# Patient Record
Sex: FEMALE | Race: BLACK OR AFRICAN AMERICAN | NOT HISPANIC OR LATINO | Employment: UNEMPLOYED | URBAN - METROPOLITAN AREA
[De-identification: names, ages, dates, MRNs, and addresses within clinical notes are randomized per-mention and may not be internally consistent; named-entity substitution may affect disease eponyms.]

---

## 2023-07-26 ENCOUNTER — OFFICE VISIT (OUTPATIENT)
Dept: PRIMARY CARE | Facility: CLINIC | Age: 63
End: 2023-07-26
Payer: COMMERCIAL

## 2023-07-26 VITALS
SYSTOLIC BLOOD PRESSURE: 144 MMHG | DIASTOLIC BLOOD PRESSURE: 84 MMHG | WEIGHT: 278 LBS | TEMPERATURE: 96.8 F | OXYGEN SATURATION: 99 % | BODY MASS INDEX: 38.77 KG/M2 | HEART RATE: 74 BPM | RESPIRATION RATE: 16 BRPM

## 2023-07-26 DIAGNOSIS — E11.9 TYPE 2 DIABETES MELLITUS WITHOUT COMPLICATION, WITHOUT LONG-TERM CURRENT USE OF INSULIN (MULTI): ICD-10-CM

## 2023-07-26 DIAGNOSIS — E55.9 VITAMIN D DEFICIENCY: ICD-10-CM

## 2023-07-26 DIAGNOSIS — E66.9 CLASS 2 OBESITY WITHOUT SERIOUS COMORBIDITY WITH BODY MASS INDEX (BMI) OF 38.0 TO 38.9 IN ADULT, UNSPECIFIED OBESITY TYPE: ICD-10-CM

## 2023-07-26 DIAGNOSIS — F17.200 TOBACCO DEPENDENCE SYNDROME: ICD-10-CM

## 2023-07-26 DIAGNOSIS — M62.81 WEAKNESS OF TRUNK MUSCULATURE: ICD-10-CM

## 2023-07-26 DIAGNOSIS — I10 BENIGN ESSENTIAL HYPERTENSION: Primary | ICD-10-CM

## 2023-07-26 DIAGNOSIS — E78.2 HYPERLIPIDEMIA, MIXED: ICD-10-CM

## 2023-07-26 DIAGNOSIS — M47.819 SPINAL ARTHRITIS: ICD-10-CM

## 2023-07-26 DIAGNOSIS — B37.2 CANDIDAL INTERTRIGO: ICD-10-CM

## 2023-07-26 DIAGNOSIS — M25.69 DECREASED ROM OF TRUNK AND BACK: ICD-10-CM

## 2023-07-26 PROBLEM — G89.4 CHRONIC PAIN SYNDROME: Status: ACTIVE | Noted: 2023-07-26

## 2023-07-26 PROBLEM — E87.6 HYPOKALEMIA: Status: ACTIVE | Noted: 2023-07-26

## 2023-07-26 PROBLEM — M25.539 WRIST PAIN: Status: ACTIVE | Noted: 2023-07-26

## 2023-07-26 PROBLEM — M62.89 HAMSTRING TIGHTNESS: Status: ACTIVE | Noted: 2023-07-26

## 2023-07-26 PROBLEM — Z55.0 UNABLE TO READ OR WRITE: Status: ACTIVE | Noted: 2023-07-26

## 2023-07-26 PROBLEM — H10.30 ACUTE CONJUNCTIVITIS: Status: ACTIVE | Noted: 2023-07-26

## 2023-07-26 PROBLEM — M25.561 RIGHT KNEE PAIN: Status: ACTIVE | Noted: 2023-07-26

## 2023-07-26 PROBLEM — M54.2 NECK PAIN: Status: ACTIVE | Noted: 2023-07-26

## 2023-07-26 PROBLEM — K63.5 COLON POLYP: Status: ACTIVE | Noted: 2023-07-26

## 2023-07-26 PROBLEM — R07.81 RIB PAIN: Status: ACTIVE | Noted: 2023-07-26

## 2023-07-26 PROBLEM — M54.9 BACK PAIN: Status: ACTIVE | Noted: 2023-07-26

## 2023-07-26 PROBLEM — S99.921A INJURY OF TOENAIL OF RIGHT FOOT: Status: ACTIVE | Noted: 2023-07-26

## 2023-07-26 PROBLEM — G56.02 CARPAL TUNNEL SYNDROME OF LEFT WRIST: Status: ACTIVE | Noted: 2023-07-26

## 2023-07-26 PROBLEM — M08.861: Status: ACTIVE | Noted: 2023-07-26

## 2023-07-26 PROBLEM — M77.12 LATERAL EPICONDYLITIS OF LEFT ELBOW: Status: ACTIVE | Noted: 2023-07-26

## 2023-07-26 PROBLEM — K62.5 RECTAL BLEEDING: Status: ACTIVE | Noted: 2023-07-26

## 2023-07-26 PROCEDURE — 99215 OFFICE O/P EST HI 40 MIN: CPT | Performed by: STUDENT IN AN ORGANIZED HEALTH CARE EDUCATION/TRAINING PROGRAM

## 2023-07-26 PROCEDURE — 3044F HG A1C LEVEL LT 7.0%: CPT | Performed by: STUDENT IN AN ORGANIZED HEALTH CARE EDUCATION/TRAINING PROGRAM

## 2023-07-26 PROCEDURE — 1160F RVW MEDS BY RX/DR IN RCRD: CPT | Performed by: STUDENT IN AN ORGANIZED HEALTH CARE EDUCATION/TRAINING PROGRAM

## 2023-07-26 PROCEDURE — 3008F BODY MASS INDEX DOCD: CPT | Performed by: STUDENT IN AN ORGANIZED HEALTH CARE EDUCATION/TRAINING PROGRAM

## 2023-07-26 PROCEDURE — 4004F PT TOBACCO SCREEN RCVD TLK: CPT | Performed by: STUDENT IN AN ORGANIZED HEALTH CARE EDUCATION/TRAINING PROGRAM

## 2023-07-26 PROCEDURE — 1126F AMNT PAIN NOTED NONE PRSNT: CPT | Performed by: STUDENT IN AN ORGANIZED HEALTH CARE EDUCATION/TRAINING PROGRAM

## 2023-07-26 PROCEDURE — 3079F DIAST BP 80-89 MM HG: CPT | Performed by: STUDENT IN AN ORGANIZED HEALTH CARE EDUCATION/TRAINING PROGRAM

## 2023-07-26 PROCEDURE — 1159F MED LIST DOCD IN RCRD: CPT | Performed by: STUDENT IN AN ORGANIZED HEALTH CARE EDUCATION/TRAINING PROGRAM

## 2023-07-26 PROCEDURE — 3077F SYST BP >= 140 MM HG: CPT | Performed by: STUDENT IN AN ORGANIZED HEALTH CARE EDUCATION/TRAINING PROGRAM

## 2023-07-26 RX ORDER — LANCETS 33 GAUGE
EACH MISCELLANEOUS
COMMUNITY
Start: 2023-05-01 | End: 2024-01-22

## 2023-07-26 RX ORDER — KETOCONAZOLE 20 MG/G
1 CREAM TOPICAL DAILY
COMMUNITY
Start: 2023-07-10 | End: 2023-07-26 | Stop reason: ALTCHOICE

## 2023-07-26 RX ORDER — GABAPENTIN 300 MG/1
1 CAPSULE ORAL 3 TIMES DAILY
COMMUNITY
Start: 2022-01-13 | End: 2023-07-26 | Stop reason: ALTCHOICE

## 2023-07-26 RX ORDER — ISOPROPYL ALCOHOL 0.75 G/1
SWAB TOPICAL
COMMUNITY
Start: 2023-01-20 | End: 2024-01-22

## 2023-07-26 RX ORDER — AMLODIPINE BESYLATE 10 MG/1
10 TABLET ORAL DAILY
COMMUNITY
End: 2023-07-26 | Stop reason: SDUPTHER

## 2023-07-26 RX ORDER — ERGOCALCIFEROL 1.25 MG/1
1 CAPSULE ORAL
COMMUNITY
End: 2023-12-12 | Stop reason: ALTCHOICE

## 2023-07-26 RX ORDER — VIT C/E/ZN/COPPR/LUTEIN/ZEAXAN 250MG-90MG
25 CAPSULE ORAL DAILY
Qty: 90 CAPSULE | Refills: 1 | Status: SHIPPED | OUTPATIENT
Start: 2023-07-26 | End: 2023-12-12 | Stop reason: SDUPTHER

## 2023-07-26 RX ORDER — CALCIUM CITRATE/VITAMIN D3 200MG-6.25
1 TABLET ORAL 2 TIMES DAILY
COMMUNITY
End: 2024-01-22

## 2023-07-26 RX ORDER — LORATADINE 10 MG/1
10 TABLET ORAL DAILY PRN
COMMUNITY
Start: 2022-12-28 | End: 2023-07-26 | Stop reason: ALTCHOICE

## 2023-07-26 RX ORDER — HYDROCORTISONE 25 MG/G
1 CREAM TOPICAL 2 TIMES DAILY
COMMUNITY
Start: 2023-07-10 | End: 2023-07-26 | Stop reason: ALTCHOICE

## 2023-07-26 RX ORDER — ERGOCALCIFEROL 1.25 MG/1
1 CAPSULE ORAL
Qty: 90 CAPSULE | Refills: 1 | Status: CANCELLED | OUTPATIENT
Start: 2023-07-26

## 2023-07-26 RX ORDER — AMITRIPTYLINE HYDROCHLORIDE 25 MG/1
25 TABLET, FILM COATED ORAL NIGHTLY
Qty: 90 TABLET | Refills: 1 | Status: CANCELLED | OUTPATIENT
Start: 2023-07-26

## 2023-07-26 RX ORDER — TOBRAMYCIN AND DEXAMETHASONE 3; 1 MG/ML; MG/ML
1 SUSPENSION/ DROPS OPHTHALMIC 4 TIMES DAILY
COMMUNITY
Start: 2022-12-02 | End: 2023-07-26 | Stop reason: ALTCHOICE

## 2023-07-26 RX ORDER — AMLODIPINE BESYLATE 10 MG/1
10 TABLET ORAL DAILY
Qty: 90 TABLET | Refills: 1 | Status: SHIPPED | OUTPATIENT
Start: 2023-07-26 | End: 2023-12-12 | Stop reason: SDUPTHER

## 2023-07-26 RX ORDER — CLOTRIMAZOLE 1 %
1 CREAM (GRAM) TOPICAL 2 TIMES DAILY
COMMUNITY
Start: 2021-12-13 | End: 2023-07-26 | Stop reason: ALTCHOICE

## 2023-07-26 RX ORDER — CYCLOBENZAPRINE HCL 10 MG
1 TABLET ORAL 3 TIMES DAILY PRN
COMMUNITY
Start: 2021-12-13 | End: 2023-07-26 | Stop reason: ALTCHOICE

## 2023-07-26 RX ORDER — SYRINGE-NEEDLE,INSULIN,0.5 ML 28GX1/2"
600 SYRINGE, EMPTY DISPOSABLE MISCELLANEOUS 2 TIMES DAILY
COMMUNITY
Start: 2022-12-28 | End: 2023-07-26 | Stop reason: ALTCHOICE

## 2023-07-26 RX ORDER — MELOXICAM 7.5 MG/1
7.5 TABLET ORAL 2 TIMES DAILY PRN
COMMUNITY
End: 2023-07-26 | Stop reason: ALTCHOICE

## 2023-07-26 RX ORDER — NYSTATIN 100000 [USP'U]/G
POWDER TOPICAL 2 TIMES DAILY
Qty: 60 G | Refills: 1 | Status: SHIPPED | OUTPATIENT
Start: 2023-07-26 | End: 2024-04-04 | Stop reason: ALTCHOICE

## 2023-07-26 RX ORDER — AMITRIPTYLINE HYDROCHLORIDE 25 MG/1
25 TABLET, FILM COATED ORAL NIGHTLY
COMMUNITY
Start: 2022-01-13 | End: 2023-07-26 | Stop reason: ALTCHOICE

## 2023-07-26 SDOH — ECONOMIC STABILITY: FOOD INSECURITY: WITHIN THE PAST 12 MONTHS, YOU WORRIED THAT YOUR FOOD WOULD RUN OUT BEFORE YOU GOT MONEY TO BUY MORE.: NEVER TRUE

## 2023-07-26 SDOH — ECONOMIC STABILITY: FOOD INSECURITY: WITHIN THE PAST 12 MONTHS, THE FOOD YOU BOUGHT JUST DIDN'T LAST AND YOU DIDN'T HAVE MONEY TO GET MORE.: NEVER TRUE

## 2023-07-26 ASSESSMENT — ENCOUNTER SYMPTOMS
LOSS OF SENSATION IN FEET: 1
WHEEZING: 0
DIZZINESS: 0
ABDOMINAL PAIN: 0
NAUSEA: 0
COLOR CHANGE: 0
FEVER: 0
OCCASIONAL FEELINGS OF UNSTEADINESS: 0
CHILLS: 0
UNEXPECTED WEIGHT CHANGE: 0
FATIGUE: 0
CONSTIPATION: 0
COUGH: 0
VOMITING: 0
CONFUSION: 0
DIARRHEA: 0
DEPRESSION: 0
MUSCULOSKELETAL NEGATIVE: 1
SHORTNESS OF BREATH: 0
HEADACHES: 0
PALPITATIONS: 0

## 2023-07-26 ASSESSMENT — PATIENT HEALTH QUESTIONNAIRE - PHQ9
SUM OF ALL RESPONSES TO PHQ9 QUESTIONS 1 & 2: 0
2. FEELING DOWN, DEPRESSED OR HOPELESS: NOT AT ALL
1. LITTLE INTEREST OR PLEASURE IN DOING THINGS: NOT AT ALL

## 2023-07-26 ASSESSMENT — LIFESTYLE VARIABLES
AUDIT-C TOTAL SCORE: 0
HOW OFTEN DO YOU HAVE A DRINK CONTAINING ALCOHOL: NEVER
SKIP TO QUESTIONS 9-10: 1
HOW MANY STANDARD DRINKS CONTAINING ALCOHOL DO YOU HAVE ON A TYPICAL DAY: PATIENT DOES NOT DRINK
HOW OFTEN DO YOU HAVE SIX OR MORE DRINKS ON ONE OCCASION: NEVER

## 2023-07-26 ASSESSMENT — PAIN SCALES - GENERAL: PAINLEVEL: 0-NO PAIN

## 2023-07-26 NOTE — PROGRESS NOTES
Subjective   Patient ID: Macarena Adler is a 63 y.o. female who presents for Hyperlipidemia, Hypertension, and Rash (Under breast fungal cream not working).    HPI   Former [facility] pt here to estb care and also for FU visit. Reports she is doing okay, no major issues today except ongoing fungal infection under her breast. She is using fungal crm w/ minimal help. Also req handicap sticker, reports difficulty walking d/t back pain, some SOB and leg weakness.     # HTN   - io /84   - amlodipine 10 mg daily   - smokes cigs about 1/2 ppd x many years     # Vitamin D def   - last Vit D level 38 (04/22)   - takes Vit D 50,000 U wkly, req refills     # prediabetes   - last A1c 6.2 (12/22)  - not on any meds at the moment     Review of Systems   Constitutional:  Negative for chills, fatigue, fever and unexpected weight change.   HENT: Negative.     Respiratory:  Negative for cough, shortness of breath and wheezing.    Cardiovascular:  Negative for chest pain, palpitations and leg swelling.   Gastrointestinal:  Negative for abdominal pain, constipation, diarrhea, nausea and vomiting.   Musculoskeletal: Negative.    Skin:  Positive for rash. Negative for color change.   Neurological:  Negative for dizziness and headaches.   Psychiatric/Behavioral:  Negative for behavioral problems and confusion.        Objective   /84 (BP Location: Left arm, Patient Position: Sitting, BP Cuff Size: Adult)   Pulse 74   Temp 36 °C (96.8 °F) (Temporal)   Resp 16   Wt 126 kg (278 lb)   SpO2 99%   BMI 38.77 kg/m²     Physical Exam  Vitals and nursing note reviewed.   Constitutional:       Appearance: Normal appearance. She is obese.   Cardiovascular:      Rate and Rhythm: Normal rate and regular rhythm.      Pulses: Normal pulses.      Heart sounds: Normal heart sounds.   Pulmonary:      Effort: Pulmonary effort is normal. No respiratory distress.      Breath sounds: Normal breath sounds.   Abdominal:      General: Abdomen is flat.  Bowel sounds are normal.      Palpations: Abdomen is soft.   Musculoskeletal:         General: Normal range of motion.   Neurological:      General: No focal deficit present.      Mental Status: She is alert and oriented to person, place, and time.   Psychiatric:         Mood and Affect: Mood normal.         Behavior: Behavior normal.       Assessment/Plan   Former [facility] pt here to estb care and also for FU visit. She intermittently getting candida intertrigo, will start nystatin powder as below. Adv to keep area clean & dry all the time as possible.   She has diff ambulating d/t cont lower back pain/arthritis; will provide handicap sticker as requested x 2 yrs. Adv to work on weight thru diet & exercises. Others problems stable as listed below.      # HTN   - io /84, sl above the goal   - cont same for now: amlodipine 10 mg daily   - rec to work on cutting down on smoking cigs; follow DASH diet and work on wts   - BW per emr, will call for any abn results as indicated; pt made aware     # Vitamin D def   - last Vit D level 38 (04/22)  - will stop 50,000 U wkly; start on Vit D 1000 U daily     # prediabetes   - last A1c 6.2 (12/22)  - cont following low carb diet and daily exercises   - repeat A1c and other labs as below   Problem List Items Addressed This Visit       Benign essential hypertension - Primary    Relevant Medications    amLODIPine (Norvasc) 10 mg tablet    Other Relevant Orders    Comprehensive metabolic panel    Decreased ROM of trunk and back    Relevant Orders    Disability Placard    Hyperlipidemia, mixed    Relevant Orders    CBC    Diabetes mellitus, type 2 (CMS/HCC)    Relevant Orders    Hemoglobin A1c    CBC    Spinal arthritis    Relevant Orders    Disability Placard    Vitamin D deficiency    Relevant Medications    cholecalciferol (Vitamin D-3) 25 MCG (1000 UT) capsule    Weakness of trunk musculature    Relevant Orders    Disability Placard    Obesity, unspecified    Tobacco dependence  syndrome    Relevant Orders    Lipid Panel     Other Visit Diagnoses       Candidal intertrigo        Relevant Medications    nystatin (Mycostatin) 100,000 unit/gram powder          Rtc 5-6 mo for HM/FU.    Adilson Kelly MD   [facility], [facility]

## 2023-10-24 ENCOUNTER — TELEPHONE (OUTPATIENT)
Dept: GASTROENTEROLOGY | Facility: CLINIC | Age: 63
End: 2023-10-24
Payer: COMMERCIAL

## 2023-10-24 DIAGNOSIS — D12.6 ADENOMATOUS POLYP OF COLON, UNSPECIFIED PART OF COLON: ICD-10-CM

## 2023-10-24 NOTE — TELEPHONE ENCOUNTER
Patient is due for her 5 year repeat colonoscopy - last done by Dr. Alexander 2018    Patient calling to schedule. Please place order for repeat. Will process via Open access when placed. OA process explained to patient.

## 2023-10-27 ENCOUNTER — TELEPHONE (OUTPATIENT)
Dept: GASTROENTEROLOGY | Facility: CLINIC | Age: 63
End: 2023-10-27
Payer: COMMERCIAL

## 2023-10-27 NOTE — TELEPHONE ENCOUNTER
----- Message from Hazel King RN sent at 10/26/2023  2:50 PM EDT -----  Regarding: Colonoscopy screening  Open Access

## 2023-10-27 NOTE — TELEPHONE ENCOUNTER
H: 6'  W: 285  BMI: 38.6    PLEASE ALSO PUT ON NOTE LINE ADENOMATOUS POLYP OF COLON, UNSPECIFIED PART OF

## 2023-10-30 NOTE — TELEPHONE ENCOUNTER
Pt came in today to schedule. Pt is scheduled with Nieves 11/29/2023 Miralax prep given. Packet was given to pt.

## 2023-11-15 ENCOUNTER — LAB (OUTPATIENT)
Dept: LAB | Facility: LAB | Age: 63
End: 2023-11-15
Payer: COMMERCIAL

## 2023-11-15 DIAGNOSIS — I10 BENIGN ESSENTIAL HYPERTENSION: ICD-10-CM

## 2023-11-15 DIAGNOSIS — E78.2 HYPERLIPIDEMIA, MIXED: ICD-10-CM

## 2023-11-15 DIAGNOSIS — E11.9 TYPE 2 DIABETES MELLITUS WITHOUT COMPLICATION, WITHOUT LONG-TERM CURRENT USE OF INSULIN (MULTI): ICD-10-CM

## 2023-11-15 DIAGNOSIS — F17.200 TOBACCO DEPENDENCE SYNDROME: ICD-10-CM

## 2023-11-15 LAB
ALBUMIN SERPL BCP-MCNC: 3.9 G/DL (ref 3.4–5)
ALP SERPL-CCNC: 117 U/L (ref 33–136)
ALT SERPL W P-5'-P-CCNC: 15 U/L (ref 7–45)
ANION GAP SERPL CALC-SCNC: 12 MMOL/L (ref 10–20)
AST SERPL W P-5'-P-CCNC: 16 U/L (ref 9–39)
BILIRUB SERPL-MCNC: 0.3 MG/DL (ref 0–1.2)
BUN SERPL-MCNC: 12 MG/DL (ref 6–23)
CALCIUM SERPL-MCNC: 9.1 MG/DL (ref 8.6–10.3)
CHLORIDE SERPL-SCNC: 107 MMOL/L (ref 98–107)
CHOLEST SERPL-MCNC: 173 MG/DL (ref 0–199)
CHOLESTEROL/HDL RATIO: 3.4
CO2 SERPL-SCNC: 24 MMOL/L (ref 21–32)
CREAT SERPL-MCNC: 0.87 MG/DL (ref 0.5–1.05)
ERYTHROCYTE [DISTWIDTH] IN BLOOD BY AUTOMATED COUNT: 14.4 % (ref 11.5–14.5)
GFR SERPL CREATININE-BSD FRML MDRD: 74 ML/MIN/1.73M*2
GLUCOSE SERPL-MCNC: 90 MG/DL (ref 74–99)
HCT VFR BLD AUTO: 41.1 % (ref 36–46)
HDLC SERPL-MCNC: 51 MG/DL
HGB BLD-MCNC: 13.3 G/DL (ref 12–16)
LDLC SERPL CALC-MCNC: 98 MG/DL
MCH RBC QN AUTO: 28.5 PG (ref 26–34)
MCHC RBC AUTO-ENTMCNC: 32.4 G/DL (ref 32–36)
MCV RBC AUTO: 88 FL (ref 80–100)
NON HDL CHOLESTEROL: 122 MG/DL (ref 0–149)
NRBC BLD-RTO: 0 /100 WBCS (ref 0–0)
PLATELET # BLD AUTO: 267 X10*3/UL (ref 150–450)
POTASSIUM SERPL-SCNC: 4.1 MMOL/L (ref 3.5–5.3)
PROT SERPL-MCNC: 6.6 G/DL (ref 6.4–8.2)
RBC # BLD AUTO: 4.67 X10*6/UL (ref 4–5.2)
SODIUM SERPL-SCNC: 139 MMOL/L (ref 136–145)
TRIGL SERPL-MCNC: 121 MG/DL (ref 0–149)
VLDL: 24 MG/DL (ref 0–40)
WBC # BLD AUTO: 6.2 X10*3/UL (ref 4.4–11.3)

## 2023-11-15 PROCEDURE — 80061 LIPID PANEL: CPT

## 2023-11-15 PROCEDURE — 36415 COLL VENOUS BLD VENIPUNCTURE: CPT

## 2023-11-15 PROCEDURE — 85027 COMPLETE CBC AUTOMATED: CPT

## 2023-11-15 PROCEDURE — 83036 HEMOGLOBIN GLYCOSYLATED A1C: CPT

## 2023-11-15 PROCEDURE — 80053 COMPREHEN METABOLIC PANEL: CPT

## 2023-11-16 LAB
EST. AVERAGE GLUCOSE BLD GHB EST-MCNC: 140 MG/DL
HBA1C MFR BLD: 6.5 %

## 2023-11-28 ENCOUNTER — ANESTHESIA EVENT (OUTPATIENT)
Dept: GASTROENTEROLOGY | Facility: HOSPITAL | Age: 63
End: 2023-11-28
Payer: COMMERCIAL

## 2023-11-29 ENCOUNTER — HOSPITAL ENCOUNTER (OUTPATIENT)
Dept: GASTROENTEROLOGY | Facility: HOSPITAL | Age: 63
Discharge: HOME | End: 2023-11-29
Payer: COMMERCIAL

## 2023-11-29 ENCOUNTER — ANESTHESIA (OUTPATIENT)
Dept: GASTROENTEROLOGY | Facility: HOSPITAL | Age: 63
End: 2023-11-29
Payer: COMMERCIAL

## 2023-11-29 VITALS
TEMPERATURE: 98 F | HEART RATE: 79 BPM | BODY MASS INDEX: 38.74 KG/M2 | RESPIRATION RATE: 16 BRPM | WEIGHT: 286 LBS | HEIGHT: 72 IN | DIASTOLIC BLOOD PRESSURE: 95 MMHG | SYSTOLIC BLOOD PRESSURE: 144 MMHG | OXYGEN SATURATION: 95 %

## 2023-11-29 DIAGNOSIS — D12.6 ADENOMATOUS POLYP OF COLON, UNSPECIFIED PART OF COLON: ICD-10-CM

## 2023-11-29 PROCEDURE — 2500000004 HC RX 250 GENERAL PHARMACY W/ HCPCS (ALT 636 FOR OP/ED): Performed by: INTERNAL MEDICINE

## 2023-11-29 PROCEDURE — 7100000009 HC PHASE TWO TIME - INITIAL BASE CHARGE: Performed by: INTERNAL MEDICINE

## 2023-11-29 PROCEDURE — 2500000004 HC RX 250 GENERAL PHARMACY W/ HCPCS (ALT 636 FOR OP/ED): Performed by: NURSE ANESTHETIST, CERTIFIED REGISTERED

## 2023-11-29 PROCEDURE — 3700000002 HC GENERAL ANESTHESIA TIME - EACH INCREMENTAL 1 MINUTE: Performed by: INTERNAL MEDICINE

## 2023-11-29 PROCEDURE — 88305 TISSUE EXAM BY PATHOLOGIST: CPT | Mod: TC,SUR,PORLAB | Performed by: INTERNAL MEDICINE

## 2023-11-29 PROCEDURE — 3700000001 HC GENERAL ANESTHESIA TIME - INITIAL BASE CHARGE: Performed by: INTERNAL MEDICINE

## 2023-11-29 PROCEDURE — 7100000010 HC PHASE TWO TIME - EACH INCREMENTAL 1 MINUTE: Performed by: INTERNAL MEDICINE

## 2023-11-29 PROCEDURE — 45385 COLONOSCOPY W/LESION REMOVAL: CPT | Performed by: INTERNAL MEDICINE

## 2023-11-29 PROCEDURE — 88305 TISSUE EXAM BY PATHOLOGIST: CPT | Performed by: STUDENT IN AN ORGANIZED HEALTH CARE EDUCATION/TRAINING PROGRAM

## 2023-11-29 PROCEDURE — 2500000005 HC RX 250 GENERAL PHARMACY W/O HCPCS: Performed by: NURSE ANESTHETIST, CERTIFIED REGISTERED

## 2023-11-29 RX ORDER — FENTANYL CITRATE 50 UG/ML
INJECTION, SOLUTION INTRAMUSCULAR; INTRAVENOUS AS NEEDED
Status: DISCONTINUED | OUTPATIENT
Start: 2023-11-29 | End: 2023-11-29

## 2023-11-29 RX ORDER — SODIUM CHLORIDE 9 MG/ML
20 INJECTION, SOLUTION INTRAVENOUS CONTINUOUS
Status: DISCONTINUED | OUTPATIENT
Start: 2023-11-29 | End: 2023-11-30 | Stop reason: HOSPADM

## 2023-11-29 RX ORDER — LIDOCAINE HYDROCHLORIDE 20 MG/ML
INJECTION, SOLUTION EPIDURAL; INFILTRATION; INTRACAUDAL; PERINEURAL AS NEEDED
Status: DISCONTINUED | OUTPATIENT
Start: 2023-11-29 | End: 2023-11-29

## 2023-11-29 RX ORDER — PROPOFOL 10 MG/ML
INJECTION, EMULSION INTRAVENOUS AS NEEDED
Status: DISCONTINUED | OUTPATIENT
Start: 2023-11-29 | End: 2023-11-29

## 2023-11-29 RX ADMIN — FENTANYL CITRATE 50 MCG: 50 INJECTION, SOLUTION INTRAMUSCULAR; INTRAVENOUS at 09:37

## 2023-11-29 RX ADMIN — PROPOFOL 50 MG: 10 INJECTION, EMULSION INTRAVENOUS at 09:52

## 2023-11-29 RX ADMIN — PROPOFOL 50 MG: 10 INJECTION, EMULSION INTRAVENOUS at 09:48

## 2023-11-29 RX ADMIN — PROPOFOL 50 MG: 10 INJECTION, EMULSION INTRAVENOUS at 09:43

## 2023-11-29 RX ADMIN — LIDOCAINE HYDROCHLORIDE 20 MG: 20 INJECTION, SOLUTION EPIDURAL; INFILTRATION; INTRACAUDAL; PERINEURAL at 09:37

## 2023-11-29 RX ADMIN — PROPOFOL 50 MG: 10 INJECTION, EMULSION INTRAVENOUS at 09:40

## 2023-11-29 RX ADMIN — PROPOFOL 50 MG: 10 INJECTION, EMULSION INTRAVENOUS at 09:45

## 2023-11-29 RX ADMIN — SODIUM CHLORIDE 20 ML/HR: 9 INJECTION, SOLUTION INTRAVENOUS at 09:30

## 2023-11-29 RX ADMIN — FENTANYL CITRATE 25 MCG: 50 INJECTION, SOLUTION INTRAMUSCULAR; INTRAVENOUS at 09:43

## 2023-11-29 RX ADMIN — PROPOFOL 100 MG: 10 INJECTION, EMULSION INTRAVENOUS at 09:37

## 2023-11-29 RX ADMIN — PROPOFOL 50 MG: 10 INJECTION, EMULSION INTRAVENOUS at 09:50

## 2023-11-29 RX ADMIN — FENTANYL CITRATE 25 MCG: 50 INJECTION, SOLUTION INTRAMUSCULAR; INTRAVENOUS at 09:40

## 2023-11-29 SDOH — HEALTH STABILITY: MENTAL HEALTH: CURRENT SMOKER: 1

## 2023-11-29 ASSESSMENT — PAIN - FUNCTIONAL ASSESSMENT
PAIN_FUNCTIONAL_ASSESSMENT: 0-10

## 2023-11-29 ASSESSMENT — PAIN SCALES - GENERAL
PAINLEVEL_OUTOF10: 0 - NO PAIN
PAIN_LEVEL: 0

## 2023-11-29 ASSESSMENT — COLUMBIA-SUICIDE SEVERITY RATING SCALE - C-SSRS
6. HAVE YOU EVER DONE ANYTHING, STARTED TO DO ANYTHING, OR PREPARED TO DO ANYTHING TO END YOUR LIFE?: NO
2. HAVE YOU ACTUALLY HAD ANY THOUGHTS OF KILLING YOURSELF?: NO
1. IN THE PAST MONTH, HAVE YOU WISHED YOU WERE DEAD OR WISHED YOU COULD GO TO SLEEP AND NOT WAKE UP?: NO

## 2023-11-29 NOTE — ANESTHESIA PREPROCEDURE EVALUATION
Patient: Macarena Adler    Procedure Information       Date/Time: 11/29/23 1045    Scheduled providers: Ollie Pickett DO    Procedure: COLONOSCOPY    Location:  [facility]            Relevant Problems   Anesthesia (within normal limits)      Cardiovascular   (+) Benign essential hypertension   (+) Hyperlipidemia, mixed   (+) Rib pain      Endocrine   (+) Diabetes mellitus, type 2 (CMS/HCC)   (+) Obesity, unspecified      GI   (+) Rectal bleeding      /Renal (within normal limits)      Neuro/Psych   (+) Carpal tunnel syndrome of left wrist      GI/Hepatic (within normal limits)      Hematology (within normal limits)      Musculoskeletal   (+) Carpal tunnel syndrome of left wrist   (+) Chronic pain syndrome      Infectious Disease   (+) Candidal dermatitis      Other   (+) Lateral epicondylitis of left elbow   (+) Other juvenile arthritis, right knee (CMS/HCC)   (+) Spinal arthritis       Clinical information reviewed:   Tobacco  Allergies  Meds   Med Hx  Surg Hx  OB Status  Fam Hx  Soc   Hx        NPO Detail:  NPO/Void Status  Date of Last Liquid: 11/29/23  Time of Last Liquid: 0700  Date of Last Solid: 11/27/23  Last Intake Type: Clear fluids         Physical Exam    Airway  Mallampati: III  TM distance: >3 FB  Neck ROM: full     Cardiovascular - normal exam  Rhythm: regular  Rate: normal     Dental - normal exam     Pulmonary - normal exam     Abdominal - normal exam             Anesthesia Plan    ASA 3     MAC     The patient is a current smoker.  Patient was previously instructed to abstain from smoking on day of procedure.  Patient did not smoke on day of procedure.    intravenous induction   Anesthetic plan and risks discussed with patient.

## 2023-11-29 NOTE — LETTER
December 11, 2023     Macarena Adler  [address] [place] [address]      Dear Ms. Adler:    Below are the results from your recent visit:      The polyp that I removed during your recent colonoscopy was a tubular adenoma on pathology.  This type of polyp is not a cancer, but it is the type of polyp that could grow into a cancer over time.  Any polyps that were removed will not grow into a cancer, but having polyps like this can increase your risk of developing other polyps or eventually a cancer.  Because of that risk I would recommend that you have another colonoscopy in about 5 years to look for other polyps.     If you have any other questions or concerns please do not hesitate to call me at my office at [phone].     Repeat Colonoscopy Interval: 5 years    If you have any questions or concerns, please don't hesitate to call.         Sincerely,        Ollie Pickett DO                              Resulted Orders   Surgical Pathology Exam   Result Value Ref Range    Case Report       Surgical Pathology                                Case: [id number]                                  Authorizing Provider:  Ollie Pickett DO    Collected:           11/29/2023 0945              Ordering Location:     [facility]    Received:            11/29/2023 1212                                     [facility]                                                                     Pathologist:           Chirag Cotton MD                                                                  Specimen:    COLON - TRANSVERSE POLYP, X2                                                               FINAL DIAGNOSIS       A. TRANSVERSE COLON POLYP X2:    -- FRAGMENTS OF TUBULAR ADENOMA               By the signature on this report, the individual or group listed as making the Final Interpretation/Diagnosis certifies that they have reviewed this case.       Clinical History       Adenomatous polyp of colon D12.6      Gross  "Description       Received in formalin, labeled with the patient's name and hospital number and \"1\", are multiple fragments of tan, soft tissue aggregating to 1.7 x 0.7 x 0.5 cm. The specimen is submitted in toto in one cassette.  [facility]           "

## 2023-11-29 NOTE — H&P
History Of Present Illness  Macarena Adler is a 63 y.o. female presenting for colonoscopy with a hx of TA.     Past Medical History  Past Medical History:   Diagnosis Date    Hyperlipidemia     Hypertension     Personal history of other diseases of the circulatory system     History of hypertension    Personal history of other endocrine, nutritional and metabolic disease     History of hyperlipidemia       Surgical History  Past Surgical History:   Procedure Laterality Date    HYSTERECTOMY  08/17/2017    Hysterectomy        Social History  She reports that she has been smoking cigarettes. She has a 20.00 pack-year smoking history. She has never used smokeless tobacco. She reports that she does not drink alcohol and does not use drugs.    Family History  Family History   Problem Relation Name Age of Onset    No Known Problems Mother      No Known Problems Father      Colon cancer Other          Allergies  Patient has no known allergies.    Review of Systems     Physical Exam     Last Recorded Vitals  There were no vitals taken for this visit.    Relevant Results        Current Outpatient Medications   Medication Instructions    amLODIPine (NORVASC) 10 mg, oral, Daily    BD Alcohol Swabs pads, medicated TESTING ONCE DAILY    cholecalciferol (VITAMIN D-3) 25 mcg, oral, Daily    ergocalciferol (Vitamin D-2) 1.25 MG (86827 UT) capsule 1 capsule, oral, Weekly    nystatin (Mycostatin) 100,000 unit/gram powder Topical, 2 times daily, X 3-4 wks then as needed for fungal infection.    True Metrix Glucose Test Strip strip 1 strip, 2 times daily    Unilet Lancet 33 gauge misc USE AS DIRECTED ONCE DAILY          Assessment/Plan       Hx of colon polyps   - TA in 2018  - colon for adonay Pickett DO

## 2023-11-29 NOTE — ANESTHESIA POSTPROCEDURE EVALUATION
Patient: Macarena Adler    Procedure Summary       Date: 11/29/23 Room / Location: [facility]    Anesthesia Start: 0932 Anesthesia Stop: 1000    Procedure: COLONOSCOPY Diagnosis: Adenomatous polyp of colon, unspecified part of colon    Scheduled Providers: Ollie Pickett DO Responsible Provider: SINGH Bernal    Anesthesia Type: MAC ASA Status: 3            Anesthesia Type: MAC    Vitals Value Taken Time   /77 11/29/23 1000   Temp 36.4 °C (97.5 °F) 11/29/23 1000   Pulse 90 11/29/23 1000   Resp 16 11/29/23 1000   SpO2 93 % 11/29/23 1000       Anesthesia Post Evaluation    Patient location during evaluation: bedside  Patient participation: complete - patient participated  Level of consciousness: awake and alert  Pain score: 0  Pain management: adequate  Airway patency: patent  Cardiovascular status: acceptable  Respiratory status: acceptable  Hydration status: acceptable  Postoperative Nausea and Vomiting: none        There were no known notable events for this encounter.

## 2023-11-30 NOTE — ADDENDUM NOTE
Encounter addended by: Tersea Mccauley RN on: 11/30/2023 10:35 AM   Actions taken: Contacts section saved, Flowsheet accepted

## 2023-12-08 LAB
LABORATORY COMMENT REPORT: NORMAL
PATH REPORT.FINAL DX SPEC: NORMAL
PATH REPORT.GROSS SPEC: NORMAL
PATH REPORT.RELEVANT HX SPEC: NORMAL
PATH REPORT.TOTAL CANCER: NORMAL

## 2023-12-12 ENCOUNTER — OFFICE VISIT (OUTPATIENT)
Dept: PRIMARY CARE | Facility: CLINIC | Age: 63
End: 2023-12-12
Payer: COMMERCIAL

## 2023-12-12 VITALS
HEIGHT: 72 IN | OXYGEN SATURATION: 93 % | TEMPERATURE: 96.6 F | SYSTOLIC BLOOD PRESSURE: 123 MMHG | DIASTOLIC BLOOD PRESSURE: 77 MMHG | HEART RATE: 71 BPM | BODY MASS INDEX: 39.68 KG/M2 | WEIGHT: 293 LBS

## 2023-12-12 DIAGNOSIS — E55.9 VITAMIN D DEFICIENCY: ICD-10-CM

## 2023-12-12 DIAGNOSIS — E11.9 TYPE 2 DIABETES MELLITUS WITHOUT COMPLICATION, WITHOUT LONG-TERM CURRENT USE OF INSULIN (MULTI): ICD-10-CM

## 2023-12-12 DIAGNOSIS — Z12.31 ENCOUNTER FOR SCREENING MAMMOGRAM FOR MALIGNANT NEOPLASM OF BREAST: ICD-10-CM

## 2023-12-12 DIAGNOSIS — Z00.00 ROUTINE GENERAL MEDICAL EXAMINATION AT A HEALTH CARE FACILITY: Primary | ICD-10-CM

## 2023-12-12 DIAGNOSIS — Z71.6 ENCOUNTER FOR TOBACCO USE CESSATION COUNSELING: ICD-10-CM

## 2023-12-12 DIAGNOSIS — I10 BENIGN ESSENTIAL HYPERTENSION: ICD-10-CM

## 2023-12-12 DIAGNOSIS — F17.210 SMOKES CIGARETTES: ICD-10-CM

## 2023-12-12 PROCEDURE — 4004F PT TOBACCO SCREEN RCVD TLK: CPT | Performed by: STUDENT IN AN ORGANIZED HEALTH CARE EDUCATION/TRAINING PROGRAM

## 2023-12-12 PROCEDURE — 99406 BEHAV CHNG SMOKING 3-10 MIN: CPT | Performed by: STUDENT IN AN ORGANIZED HEALTH CARE EDUCATION/TRAINING PROGRAM

## 2023-12-12 PROCEDURE — 3044F HG A1C LEVEL LT 7.0%: CPT | Performed by: STUDENT IN AN ORGANIZED HEALTH CARE EDUCATION/TRAINING PROGRAM

## 2023-12-12 PROCEDURE — 99213 OFFICE O/P EST LOW 20 MIN: CPT | Performed by: STUDENT IN AN ORGANIZED HEALTH CARE EDUCATION/TRAINING PROGRAM

## 2023-12-12 PROCEDURE — 1160F RVW MEDS BY RX/DR IN RCRD: CPT | Performed by: STUDENT IN AN ORGANIZED HEALTH CARE EDUCATION/TRAINING PROGRAM

## 2023-12-12 PROCEDURE — 1159F MED LIST DOCD IN RCRD: CPT | Performed by: STUDENT IN AN ORGANIZED HEALTH CARE EDUCATION/TRAINING PROGRAM

## 2023-12-12 PROCEDURE — 1126F AMNT PAIN NOTED NONE PRSNT: CPT | Performed by: STUDENT IN AN ORGANIZED HEALTH CARE EDUCATION/TRAINING PROGRAM

## 2023-12-12 PROCEDURE — 3048F LDL-C <100 MG/DL: CPT | Performed by: STUDENT IN AN ORGANIZED HEALTH CARE EDUCATION/TRAINING PROGRAM

## 2023-12-12 PROCEDURE — 3074F SYST BP LT 130 MM HG: CPT | Performed by: STUDENT IN AN ORGANIZED HEALTH CARE EDUCATION/TRAINING PROGRAM

## 2023-12-12 PROCEDURE — 3008F BODY MASS INDEX DOCD: CPT | Performed by: STUDENT IN AN ORGANIZED HEALTH CARE EDUCATION/TRAINING PROGRAM

## 2023-12-12 PROCEDURE — 99397 PER PM REEVAL EST PAT 65+ YR: CPT | Performed by: STUDENT IN AN ORGANIZED HEALTH CARE EDUCATION/TRAINING PROGRAM

## 2023-12-12 PROCEDURE — 3078F DIAST BP <80 MM HG: CPT | Performed by: STUDENT IN AN ORGANIZED HEALTH CARE EDUCATION/TRAINING PROGRAM

## 2023-12-12 RX ORDER — AMLODIPINE BESYLATE 10 MG/1
10 TABLET ORAL DAILY
Qty: 90 TABLET | Refills: 1 | Status: SHIPPED | OUTPATIENT
Start: 2023-12-12

## 2023-12-12 RX ORDER — METFORMIN HYDROCHLORIDE 500 MG/1
500 TABLET ORAL
Qty: 60 TABLET | Refills: 3 | Status: SHIPPED | OUTPATIENT
Start: 2023-12-12 | End: 2024-04-04 | Stop reason: ALTCHOICE

## 2023-12-12 RX ORDER — VIT C/E/ZN/COPPR/LUTEIN/ZEAXAN 250MG-90MG
25 CAPSULE ORAL DAILY
Qty: 90 CAPSULE | Refills: 1 | Status: SHIPPED | OUTPATIENT
Start: 2023-12-12

## 2023-12-12 ASSESSMENT — ENCOUNTER SYMPTOMS
FATIGUE: 0
PALPITATIONS: 0
DIARRHEA: 0
CONFUSION: 0
UNEXPECTED WEIGHT CHANGE: 0
CONSTIPATION: 0
CHILLS: 0
DIZZINESS: 0
FEVER: 0
VOMITING: 0
COUGH: 0
COLOR CHANGE: 0
SHORTNESS OF BREATH: 0
ABDOMINAL PAIN: 0
HEADACHES: 0
MUSCULOSKELETAL NEGATIVE: 1
NAUSEA: 0
WHEEZING: 0

## 2023-12-12 NOTE — PROGRESS NOTES
"Subjective   Patient ID: Macarena Adler is a 63 y.o. female who presents for Annual Exam (Physical ) and FU visit. Reports she is doing well, no acute issues. She takes amlodipine 10 mg daily; her IO bP 123/77. Reviewed recent BW (11/29/23) as lipid, cbc & CMP wnl; A1c sl worsens at 6.5 (prev was 6.2); pt currently not on any meds.     Self health assessment: good   Concern: as above   Occupation: unemployed   Living With: son     Sleep: okay   Exercise: not so much   Diet: mixed   Tobacco: Yes; 1/2 ppd x many yrs   Alcohol: Alcohol Use: No, patient does not drink alcohol.     Dentist: last mo  Eye doctor: yearly   Hearing issues: none     Last Pap: unsure time; has an appt with GYN in 04/24.   Last Pap Result:  nl   History of abnormal Pap smear: no    LAST MAMMO DATE: 04/22; wnl.     Family history of uterine or ovarian cancer: no  Family history of breast cancer: no  Family history of colon cancer: no  Last colonoscopy & result: 11/29/23; rec repeat in 3 yrs   History of abnormal lipids: no    Review of Systems   Constitutional:  Negative for chills, fatigue, fever and unexpected weight change.   HENT: Negative.     Respiratory:  Negative for cough, shortness of breath and wheezing.    Cardiovascular:  Negative for chest pain, palpitations and leg swelling.   Gastrointestinal:  Negative for abdominal pain, constipation, diarrhea, nausea and vomiting.   Musculoskeletal: Negative.    Skin:  Negative for color change and rash.   Neurological:  Negative for dizziness and headaches.   Psychiatric/Behavioral:  Negative for behavioral problems and confusion.         Objective    /77 (BP Location: Right arm, Patient Position: Sitting, BP Cuff Size: Large adult)   Pulse 71   Temp 35.9 °C (96.6 °F)   Ht 1.854 m (6' 1\")   Wt 137 kg (303 lb)   SpO2 93%   BMI 39.98 kg/m²  Body mass index is 39.98 kg/m².    Physical Exam  Vitals and nursing note reviewed.   Constitutional:       Appearance: Normal appearance. She " is obese.   HENT:      Right Ear: Tympanic membrane normal.      Left Ear: Tympanic membrane normal.   Eyes:      Extraocular Movements: Extraocular movements intact.      Pupils: Pupils are equal, round, and reactive to light.   Cardiovascular:      Rate and Rhythm: Normal rate and regular rhythm.      Pulses: Normal pulses.      Heart sounds: Normal heart sounds.   Pulmonary:      Effort: Pulmonary effort is normal. No respiratory distress.      Breath sounds: Normal breath sounds.   Abdominal:      General: Abdomen is flat. Bowel sounds are normal.      Palpations: Abdomen is soft.   Musculoskeletal:         General: Normal range of motion.   Neurological:      General: No focal deficit present.      Mental Status: She is alert and oriented to person, place, and time.      Cranial Nerves: No cranial nerve deficit.      Sensory: No sensory deficit.      Motor: No weakness.   Psychiatric:         Mood and Affect: Mood normal.         Behavior: Behavior normal.        Assessment and Plan   She is here for annual physical and FU visit. Based on recent A1c result, 6.5 (11/29/23), she is diabetic; will start on meformin 500 mg bid as below. Adv following diabetic/low glycemic food. Work on daily exercises. Otherwise she is doing okay, no major concerns today and is clinically & vitally stable. Plan as follows      # HTN   - BP well controlled   - cont same: amlodipine 10 mg daily.   - rec dash diet and highly enc to stop smoking cigs     #HM  Screening tests:  - Mammogram (age 40-74): ordered   - Pap smear (age 21-65): follows with GYN   - Colonoscopy (age 45-75): UTD   - Lipid profile: UTD   - Low dose CT chest (age 50-80): declined     Primary prevention:  - Flu shot: UTD   - COVID vaccines: UTD   - PCV20: will come for shot next wk   - Tdap shot: declined   - RSV (age > 60): rec to get at pharmacy   - Shingles shot (age >50): UTD   - Statin (age 40-65 or high risk):     Counseling:   - Smoking: Advise to cut/quit on  smoking. Offers different options to help w/ cessations. Declined resources.  - Diet, Weight: Advise heart healthy diet (low carbs, low fat; add more fruits/veges and whole grain food) and regular exercise 30mins daily x 5 days per week.  Also rec 10mins of aerobic exercise/jogging daily x 5 days/wk  - Rec Ca (600-1200mg) and Vit D (800-1000U) daily     Others:  - Depression screening: Neg, happy appearing female  - Bld work per EMR, will call for any abn result, pt was made aware   - cont taking all other meds as rx'd     Assessment/Plan   Problem List Items Addressed This Visit             ICD-10-CM    Benign essential hypertension I10    Relevant Medications    amLODIPine (Norvasc) 10 mg tablet    Diabetes mellitus, type 2 (CMS/HCC) E11.9    Relevant Medications    metFORMIN (Glucophage) 500 mg tablet    Other Relevant Orders    Hemoglobin A1c    Vitamin D deficiency E55.9    Relevant Medications    cholecalciferol (Vitamin D-3) 25 MCG (1000 UT) capsule     Other Visit Diagnoses         Codes    Routine general medical examination at a health care facility    -  Primary Z00.00    Encounter for screening mammogram for malignant neoplasm of breast     Z12.31    Relevant Orders    BI mammo bilateral screening tomosynthesis    Encounter for tobacco use cessation counseling     Z71.6    Smokes cigarettes     F17.210          Rtc 3-4 mo for ROHITH Kelly MD    [facility], [facility]

## 2023-12-12 NOTE — PROGRESS NOTES
"Subjective   Patient ID: Macarena Adler is a 63 y.o. female who presents for Annual Exam (Physical ) and FU visit. Reports she is doing well, no acute issues.     Self health assessment: good   Concern: as above   Occupation: unemployed   Living With: son     Sleep: okay   Exercise: not so much   Diet: mixed   Tobacco: Yes; 1/2 ppd x many yrs   Alcohol: Alcohol Use: No, patient does not drink alcohol.     Dentist: last mo  Eye doctor: yearly   Hearing issues: none     Last Pap: unsure time; has an appt with GYN in 04/24.   Last Pap Result:  nl   History of abnormal Pap smear: no    LAST MAMMO DATE: 04/22; wnl.     Family history of uterine or ovarian cancer: no  Family history of breast cancer: no  Family history of colon cancer: no  Last colonoscopy & result: 11/29/23; rec repeat in 3 yrs   History of abnormal lipids: no    Review of Systems   Constitutional:  Negative for chills, fatigue, fever and unexpected weight change.   HENT: Negative.     Respiratory:  Negative for cough, shortness of breath and wheezing.    Cardiovascular:  Negative for chest pain, palpitations and leg swelling.   Gastrointestinal:  Negative for abdominal pain, constipation, diarrhea, nausea and vomiting.   Musculoskeletal: Negative.    Skin:  Negative for color change and rash.   Neurological:  Negative for dizziness and headaches.   Psychiatric/Behavioral:  Negative for behavioral problems and confusion.         Objective    /77 (BP Location: Right arm, Patient Position: Sitting, BP Cuff Size: Large adult)   Pulse 71   Temp 35.9 °C (96.6 °F)   Ht 1.854 m (6' 1\")   Wt 137 kg (303 lb)   SpO2 93%   BMI 39.98 kg/m²  Body mass index is 39.98 kg/m².    Physical Exam  Vitals and nursing note reviewed.   Constitutional:       Appearance: Normal appearance.   HENT:      Right Ear: Tympanic membrane normal.      Left Ear: Tympanic membrane normal.   Eyes:      Extraocular Movements: Extraocular movements intact.      Pupils: Pupils are " equal, round, and reactive to light.   Cardiovascular:      Rate and Rhythm: Normal rate and regular rhythm.      Pulses: Normal pulses.      Heart sounds: Normal heart sounds.   Pulmonary:      Effort: Pulmonary effort is normal. No respiratory distress.      Breath sounds: Normal breath sounds.   Abdominal:      General: Abdomen is flat. Bowel sounds are normal.      Palpations: Abdomen is soft.   Musculoskeletal:         General: Normal range of motion.   Neurological:      General: No focal deficit present.      Mental Status: She is alert and oriented to person, place, and time.   Psychiatric:         Mood and Affect: Mood normal.         Behavior: Behavior normal.          Assessment and Plan   She is here for annual physical and FU visit. Overall doing okay, no major concerns today and is clinically & vitally stable. Plan as follows      #HM  Screening tests:  - Mammogram (age 40-74):  - Pap smear (age 21-65):   - Colonoscopy (age 45-75):  - Lipid profile:   - Low dose CT chest (age 50-80)    Primary prevention:  - Flu shot: UTD   - COVID vaccines: UTD   - PCV20:   - Tdap shot: declined   - RSV (age > 60): rec to get at pharmacy   - Shingles shot (age >50): UTD   - Statin (age 40-65 or high risk):     Counseling:   - STD counseling:  D/ safe sex practice and rec to use condoms as applies   - ETOH (age>18): D/ safe drinking habits and advice to cut down on liquor/beers as applies   - Smoking: Advise to cut/quit on smoking. Offers different options to help w/ cessations.   - Diet, Weight: Advise heart healthy diet (low carbs, low fat; add more fruits/veges and whole grain food) and regular exercise 30mins daily x 5 days per week.  Also rec 10mins of aerobic exercise/jogging daily x 5 days/wk  - Rec Ca (600-1200mg) and Vit D (800-1000U) daily     Others:  - Depression screening: Neg, happy appearing female  - Bld work per EMR, will call for any abn result, pt was made aware   - cont taking all other meds as rx'd      Adilson Kelly MD   Family Medicine      Assessment/Plan   Problem List Items Addressed This Visit             ICD-10-CM    Benign essential hypertension I10    Relevant Medications    amLODIPine (Norvasc) 10 mg tablet    Diabetes mellitus, type 2 (CMS/HCC) E11.9    Relevant Medications    metFORMIN (Glucophage) 500 mg tablet    Vitamin D deficiency E55.9    Relevant Medications    cholecalciferol (Vitamin D-3) 25 MCG (1000 UT) capsule     Other Visit Diagnoses         Codes    Routine general medical examination at a health care facility    -  Primary Z00.00    Encounter for screening mammogram for malignant neoplasm of breast     Z12.31    Relevant Orders    BI mammo bilateral screening tomosynthesis

## 2023-12-21 ENCOUNTER — ANCILLARY PROCEDURE (OUTPATIENT)
Dept: RADIOLOGY | Facility: CLINIC | Age: 63
End: 2023-12-21
Payer: COMMERCIAL

## 2023-12-21 ENCOUNTER — TELEPHONE (OUTPATIENT)
Dept: PRIMARY CARE | Facility: CLINIC | Age: 63
End: 2023-12-21

## 2023-12-21 ENCOUNTER — CLINICAL SUPPORT (OUTPATIENT)
Dept: PRIMARY CARE | Facility: CLINIC | Age: 63
End: 2023-12-21
Payer: COMMERCIAL

## 2023-12-21 VITALS — WEIGHT: 293 LBS | HEIGHT: 72 IN | BODY MASS INDEX: 39.68 KG/M2

## 2023-12-21 DIAGNOSIS — E11.9 TYPE 2 DIABETES MELLITUS WITHOUT COMPLICATION, WITHOUT LONG-TERM CURRENT USE OF INSULIN (MULTI): Primary | ICD-10-CM

## 2023-12-21 DIAGNOSIS — Z23 ENCOUNTER FOR IMMUNIZATION: ICD-10-CM

## 2023-12-21 DIAGNOSIS — Z12.31 ENCOUNTER FOR SCREENING MAMMOGRAM FOR MALIGNANT NEOPLASM OF BREAST: ICD-10-CM

## 2023-12-21 PROCEDURE — 77063 BREAST TOMOSYNTHESIS BI: CPT | Performed by: RADIOLOGY

## 2023-12-21 PROCEDURE — 99211 OFF/OP EST MAY X REQ PHY/QHP: CPT | Performed by: INTERNAL MEDICINE

## 2023-12-21 PROCEDURE — 77063 BREAST TOMOSYNTHESIS BI: CPT

## 2023-12-21 PROCEDURE — 90677 PCV20 VACCINE IM: CPT | Performed by: INTERNAL MEDICINE

## 2023-12-21 PROCEDURE — 90471 IMMUNIZATION ADMIN: CPT | Performed by: INTERNAL MEDICINE

## 2023-12-21 PROCEDURE — 77067 SCR MAMMO BI INCL CAD: CPT

## 2023-12-21 PROCEDURE — 77067 SCR MAMMO BI INCL CAD: CPT | Performed by: RADIOLOGY

## 2023-12-21 NOTE — TELEPHONE ENCOUNTER
"Pt came in today and stated that metformin \"messed\" up her entire family and patient's sister  from metformin. Pt doesn't feel comfortable taking this and would like something different?  " Hospitalist Progress Note      PCP: Heavenly Laurent MD    Chief Complaint. Presented to hospital for back pain    Date of Admission: 6/23/2021      Subjective:   Is tearful and has back pain, denies chest pain, nausea, vomiting, shortness of breath, fever or chills. Medications:  Reviewed    Infusion Medications    sodium chloride       Scheduled Medications    morphine  60 mg Oral 2 times per day    gabapentin  200 mg Oral TID    sennosides-docusate sodium  4 tablet Oral Nightly    sodium chloride flush  5-40 mL Intravenous 2 times per day    enoxaparin  40 mg Subcutaneous Daily    cefTRIAXone (ROCEPHIN) IV  1,000 mg Intravenous Q24H    azithromycin  500 mg Intravenous Q24H     PRN Meds: oxyCODONE **OR** oxyCODONE, diazePAM, oxyCODONE-acetaminophen **OR** oxyCODONE-acetaminophen, HYDROmorphone, [COMPLETED] methocarbamol IVPB **FOLLOWED BY** methocarbamol, sodium chloride flush, sodium chloride, polyethylene glycol, promethazine **OR** ondansetron      Intake/Output Summary (Last 24 hours) at 6/24/2021 1319  Last data filed at 6/24/2021 0530  Gross per 24 hour   Intake 150 ml   Output    Net 150 ml       Physical Exam Performed:    /76   Pulse 92   Temp 97.5 °F (36.4 °C) (Oral)   Resp 16   Ht 5' 9\" (1.753 m)   Wt 298 lb (135.2 kg)   LMP  (LMP Unknown)   SpO2 94%   BMI 44.01 kg/m²     General appearance: No apparent distress,   HEENT:  Conjunctivae/corneas clear. Neck: Supple, with full range of motion. Respiratory:  Normal respiratory effort. Clear to auscultation, bilaterally without Rales/Wheezes/Rhonchi. Cardiovascular: Regular rate and rhythm with normal S1/S2 without murmurs or rubs  Abdomen: Soft, non-tender, non-distended, normal bowel sounds. Musculoskeletal: No cyanosis or edema bilaterally  Neurologic:  without any focal sensory/motor deficits.  grossly non-focal.  Psychiatric: Alert and oriented, Normal mood  Peripheral Pulses: +2 palpable, equal bilaterally Labs:   Recent Labs     06/23/21  1515 06/24/21  0618   WBC 13.2* 9.9   HGB 8.0* 7.8*   HCT 24.3* 24.0*    107*     Recent Labs     06/23/21  1515 06/24/21  0618   * 135*   K 4.9 4.7    100   CO2 24 23   BUN 11 11   CREATININE 0.8 0.7   CALCIUM 8.1* 8.2*     Recent Labs     06/23/21  1515 06/24/21  0618   * 438*   * 362*   BILIDIR 0.4*  --    BILITOT 0.7 0.4   ALKPHOS 117 115     No results for input(s): INR in the last 72 hours. No results for input(s):  Elder in the last 72 hours. Urinalysis:      Lab Results   Component Value Date    NITRU Negative 06/23/2021    WBCUA 3-5 06/23/2021    BACTERIA Rare 03/27/2021    RBCUA None seen 06/23/2021    BLOODU Negative 06/23/2021    SPECGRAV 1.015 06/23/2021    GLUCOSEU Negative 06/23/2021       Radiology:  CT ABDOMEN PELVIS W IV CONTRAST Additional Contrast? None   Final Result   1. 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 This is chronic in appearance. Follow-up CT of the abdomen in 6 months is recommended. 2. Hepatic steatosis. 3. Mild gallbladder wall thickening, nonspecific. 4. 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 5. Bilateral lower lobe bronchopneumonia. Findings discussed with the emergency department at the time of report. CT Head WO Contrast   Final Result      No evidence of acute intracranial abnormality.                      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  Neurosurgery consulted  Oncology consulted  Palliative care consulted  Continue home morphine with hold parameters, dilaudid added prn     Elevated LFTS  Likely due to chemo as per oncology  Check CMP daily  Normal ammonia     B/l pna  Started on IV ceftriaxone and azithromycin  Check urine Legionella and streptococci     Acute anemia  Likely due to above  Check an H&H every 12 hourly, may need blood transfusion if Hb continue to go down     Leukocytosis:- due to above  Continue to monitor     DVT Prophylaxis: lovenox  Diet: ADULT DIET; Regular  Code Status: Full Code  PT/OT Eval Status: Will order once pain is better    DVT Prophylaxis:  Diet: ADULT DIET; Regular  Adult Oral Nutrition Supplement; Standard High Calorie/High Protein Oral Supplement  Adult Oral Nutrition Supplement; Standard High Calorie/High Protein Oral Supplement  Adult Oral Nutrition Supplement;  Fortified Pudding Oral Supplement  Code Status: Full Code    PT/OT Eval Status: ordered    Dispo - D tomorrow if Hb stays stable    Dary Harrington MD

## 2023-12-22 NOTE — TELEPHONE ENCOUNTER
Called patient, notified of message, patient expressed verbal understanding had no questions at this time. Pt asked if having a headache was normal after getting a pneumonia vaccine yesterday. Per Gabriela, read the list of symptoms off the CDC vaccine information sheet to patient. Pt expressed understanding and had no further questions. Also advised patient of concerning symptoms and requesting pt seek UC or ER if pt experienced them.

## 2024-01-22 DIAGNOSIS — E11.9 TYPE 2 DIABETES MELLITUS WITHOUT COMPLICATIONS (MULTI): ICD-10-CM

## 2024-01-22 RX ORDER — ISOPROPYL ALCOHOL 0.75 G/1
SWAB TOPICAL
Qty: 100 EACH | Refills: 2 | Status: SHIPPED | OUTPATIENT
Start: 2024-01-22

## 2024-01-22 RX ORDER — LANCETS 33 GAUGE
EACH MISCELLANEOUS
Qty: 100 EACH | Refills: 2 | Status: SHIPPED | OUTPATIENT
Start: 2024-01-22

## 2024-01-22 RX ORDER — CALCIUM CITRATE/VITAMIN D3 200MG-6.25
1 TABLET ORAL 2 TIMES DAILY
Qty: 100 STRIP | Refills: 2 | Status: SHIPPED | OUTPATIENT
Start: 2024-01-22

## 2024-01-23 ENCOUNTER — TELEPHONE (OUTPATIENT)
Dept: PRIMARY CARE | Facility: CLINIC | Age: 64
End: 2024-01-23
Payer: COMMERCIAL

## 2024-01-23 NOTE — TELEPHONE ENCOUNTER
Pt was just dx from the [facility] and is requesting a foldable walker to help her. Please advise.

## 2024-03-12 DIAGNOSIS — E11.9 TYPE 2 DIABETES MELLITUS WITHOUT COMPLICATION, WITHOUT LONG-TERM CURRENT USE OF INSULIN (MULTI): ICD-10-CM

## 2024-03-12 RX ORDER — EMPAGLIFLOZIN 10 MG/1
10 TABLET, FILM COATED ORAL DAILY
Qty: 30 TABLET | Refills: 2 | Status: SHIPPED | OUTPATIENT
Start: 2024-03-12 | End: 2024-04-04 | Stop reason: SDUPTHER

## 2024-03-28 ENCOUNTER — APPOINTMENT (OUTPATIENT)
Dept: PRIMARY CARE | Facility: CLINIC | Age: 64
End: 2024-03-28
Payer: COMMERCIAL

## 2024-03-28 PROBLEM — L82.1 OTHER SEBORRHEIC KERATOSIS: Status: ACTIVE | Noted: 2022-09-16

## 2024-03-28 PROBLEM — L30.4 ERYTHEMA INTERTRIGO: Status: ACTIVE | Noted: 2022-09-16

## 2024-03-28 PROBLEM — B37.2 CANDIDIASIS OF SKIN AND NAIL: Status: ACTIVE | Noted: 2022-09-16

## 2024-04-04 ENCOUNTER — LAB (OUTPATIENT)
Dept: LAB | Facility: LAB | Age: 64
End: 2024-04-04
Payer: COMMERCIAL

## 2024-04-04 ENCOUNTER — OFFICE VISIT (OUTPATIENT)
Dept: PRIMARY CARE | Facility: CLINIC | Age: 64
End: 2024-04-04
Payer: COMMERCIAL

## 2024-04-04 VITALS
BODY MASS INDEX: 39.32 KG/M2 | OXYGEN SATURATION: 95 % | TEMPERATURE: 97.1 F | HEART RATE: 84 BPM | DIASTOLIC BLOOD PRESSURE: 82 MMHG | WEIGHT: 293 LBS | SYSTOLIC BLOOD PRESSURE: 121 MMHG

## 2024-04-04 DIAGNOSIS — Z71.6 ENCOUNTER FOR TOBACCO USE CESSATION COUNSELING: ICD-10-CM

## 2024-04-04 DIAGNOSIS — E11.9 TYPE 2 DIABETES MELLITUS WITHOUT COMPLICATION, WITHOUT LONG-TERM CURRENT USE OF INSULIN (MULTI): ICD-10-CM

## 2024-04-04 DIAGNOSIS — F17.210 SMOKES CIGARETTES: ICD-10-CM

## 2024-04-04 DIAGNOSIS — E78.2 HYPERLIPIDEMIA, MIXED: ICD-10-CM

## 2024-04-04 DIAGNOSIS — N95.1 HOT FLASHES DUE TO MENOPAUSE: ICD-10-CM

## 2024-04-04 DIAGNOSIS — I10 BENIGN ESSENTIAL HYPERTENSION: ICD-10-CM

## 2024-04-04 DIAGNOSIS — E11.9 TYPE 2 DIABETES MELLITUS WITHOUT COMPLICATION, WITHOUT LONG-TERM CURRENT USE OF INSULIN (MULTI): Primary | ICD-10-CM

## 2024-04-04 LAB
CREAT UR-MCNC: 130.7 MG/DL (ref 20–320)
MICROALBUMIN UR-MCNC: <7 MG/L
MICROALBUMIN/CREAT UR: NORMAL MG/G{CREAT}

## 2024-04-04 PROCEDURE — 3008F BODY MASS INDEX DOCD: CPT | Performed by: STUDENT IN AN ORGANIZED HEALTH CARE EDUCATION/TRAINING PROGRAM

## 2024-04-04 PROCEDURE — 3079F DIAST BP 80-89 MM HG: CPT | Performed by: STUDENT IN AN ORGANIZED HEALTH CARE EDUCATION/TRAINING PROGRAM

## 2024-04-04 PROCEDURE — 99214 OFFICE O/P EST MOD 30 MIN: CPT | Performed by: STUDENT IN AN ORGANIZED HEALTH CARE EDUCATION/TRAINING PROGRAM

## 2024-04-04 PROCEDURE — 82570 ASSAY OF URINE CREATININE: CPT

## 2024-04-04 PROCEDURE — 1160F RVW MEDS BY RX/DR IN RCRD: CPT | Performed by: STUDENT IN AN ORGANIZED HEALTH CARE EDUCATION/TRAINING PROGRAM

## 2024-04-04 PROCEDURE — 99406 BEHAV CHNG SMOKING 3-10 MIN: CPT | Performed by: STUDENT IN AN ORGANIZED HEALTH CARE EDUCATION/TRAINING PROGRAM

## 2024-04-04 PROCEDURE — 3074F SYST BP LT 130 MM HG: CPT | Performed by: STUDENT IN AN ORGANIZED HEALTH CARE EDUCATION/TRAINING PROGRAM

## 2024-04-04 PROCEDURE — 1125F AMNT PAIN NOTED PAIN PRSNT: CPT | Performed by: STUDENT IN AN ORGANIZED HEALTH CARE EDUCATION/TRAINING PROGRAM

## 2024-04-04 PROCEDURE — 83036 HEMOGLOBIN GLYCOSYLATED A1C: CPT

## 2024-04-04 PROCEDURE — 82043 UR ALBUMIN QUANTITATIVE: CPT

## 2024-04-04 PROCEDURE — 1159F MED LIST DOCD IN RCRD: CPT | Performed by: STUDENT IN AN ORGANIZED HEALTH CARE EDUCATION/TRAINING PROGRAM

## 2024-04-04 PROCEDURE — 36415 COLL VENOUS BLD VENIPUNCTURE: CPT

## 2024-04-04 PROCEDURE — 1123F ACP DISCUSS/DSCN MKR DOCD: CPT | Performed by: STUDENT IN AN ORGANIZED HEALTH CARE EDUCATION/TRAINING PROGRAM

## 2024-04-04 RX ORDER — PRAVASTATIN SODIUM 40 MG/1
40 TABLET ORAL DAILY
Qty: 30 TABLET | Refills: 2 | Status: SHIPPED | OUTPATIENT
Start: 2024-04-04 | End: 2024-06-04

## 2024-04-04 RX ORDER — PAROXETINE 10 MG/1
10 TABLET, FILM COATED ORAL NIGHTLY
Qty: 30 TABLET | Refills: 2 | Status: SHIPPED | OUTPATIENT
Start: 2024-04-04 | End: 2024-06-04

## 2024-04-04 ASSESSMENT — PATIENT HEALTH QUESTIONNAIRE - PHQ9
SUM OF ALL RESPONSES TO PHQ9 QUESTIONS 1 AND 2: 0
2. FEELING DOWN, DEPRESSED OR HOPELESS: NOT AT ALL
1. LITTLE INTEREST OR PLEASURE IN DOING THINGS: NOT AT ALL

## 2024-04-04 ASSESSMENT — ENCOUNTER SYMPTOMS
MUSCULOSKELETAL NEGATIVE: 1
FATIGUE: 0
SHORTNESS OF BREATH: 0
HEADACHES: 0
ABDOMINAL PAIN: 0
CONSTIPATION: 0
VOMITING: 0
COUGH: 0
DIZZINESS: 0
CHILLS: 0
DIARRHEA: 0
PALPITATIONS: 0
CONFUSION: 0
FEVER: 0
WHEEZING: 0
NAUSEA: 0
COLOR CHANGE: 0
UNEXPECTED WEIGHT CHANGE: 0

## 2024-04-04 ASSESSMENT — PAIN SCALES - GENERAL: PAINLEVEL: 6

## 2024-04-04 NOTE — PROGRESS NOTES
Subjective   Patient ID: Macarena Adler is a 63 y.o. female who presents for Follow-up (Pt is here for DM check up. Pt says she needs something for her hot flashes, she's been sweating a lot.).    HPI   She is here for FU visit. Reports she is having hot flashes on & off for long time; never bene on meds, open to try something.   She takes amlodipine 10 mg daily; her IO bP 121/82. Reviewed recent BW (11/29/23) as lipid, cbc & CMP wnl; A1c sl worsens at 6.5 (prev was 6.2); was started on metformin but didn't tolerate and was switched to Jardiance 10 mg & tolerating well. She wasn't able to do A1c as ordered at LOV. She hasn't seen podiatrist yet, saw optometrist last yr.     Review of Systems   Constitutional:  Negative for chills, fatigue, fever and unexpected weight change.   HENT: Negative.     Respiratory:  Negative for cough, shortness of breath and wheezing.    Cardiovascular:  Negative for chest pain, palpitations and leg swelling.   Gastrointestinal:  Negative for abdominal pain, constipation, diarrhea, nausea and vomiting.   Musculoskeletal: Negative.    Skin:  Negative for color change and rash.   Neurological:  Negative for dizziness and headaches.   Psychiatric/Behavioral:  Negative for behavioral problems and confusion.        Objective   /82 (BP Location: Right arm, Patient Position: Sitting, BP Cuff Size: Adult)   Pulse 84   Temp 36.2 °C (97.1 °F)   Wt 135 kg (298 lb)   SpO2 95%   BMI 39.32 kg/m²     Physical Exam  Vitals and nursing note reviewed.   Constitutional:       Appearance: Normal appearance. She is obese.   Cardiovascular:      Rate and Rhythm: Normal rate and regular rhythm.      Pulses: Normal pulses.      Heart sounds: Normal heart sounds.   Pulmonary:      Effort: Pulmonary effort is normal. No respiratory distress.      Breath sounds: Normal breath sounds.   Abdominal:      General: Abdomen is flat. Bowel sounds are normal.      Palpations: Abdomen is soft.   Musculoskeletal:          General: Normal range of motion.   Neurological:      General: No focal deficit present.      Mental Status: She is alert.   Psychiatric:         Mood and Affect: Mood normal.         Behavior: Behavior normal.       Assessment/Plan   She is here for FU visit. She likely having residual post menopausal hot flashes, will do trial of paxil 10 mg daily, inc dose s tolerated at NOV.   HTN: BP remains well controlled, cont amlodipine 10 mg daily. Follow DASH diet. Rec to quit smoking cigs.  DM2: last A1c 6.5 (11/23); will inc Jardiance to 25 mg daily, didn't tolerate metformin. Started on statin as below. Repeat A1c, obtain urine albumin; referral to podiatrist. UTD on eye exam. Cont following low glycemic food.   She uses [employer], form signed. She is otherwise clinically & vitally stable.   Problem List Items Addressed This Visit             ICD-10-CM    Benign essential hypertension I10    Hyperlipidemia, mixed E78.2    Relevant Medications    pravastatin (Pravachol) 40 mg tablet    Diabetes mellitus, type 2 (CMS/HCC) - Primary E11.9    Relevant Medications    empagliflozin (Jardiance) 25 mg    pravastatin (Pravachol) 40 mg tablet    Other Relevant Orders    Albumin, urine, random    Referral to Podiatry     Other Visit Diagnoses         Codes    Hot flashes due to menopause     N95.1    Relevant Medications    PARoxetine (Paxil) 10 mg tablet    Smokes cigarettes     F17.210    Encounter for tobacco use cessation counseling     Z71.6          Rtc 2-3 mo for FU    Adilson Kelly MD    [facility]

## 2024-04-05 LAB
EST. AVERAGE GLUCOSE BLD GHB EST-MCNC: 148 MG/DL
HBA1C MFR BLD: 6.8 %

## 2024-04-13 ENCOUNTER — HOSPITAL ENCOUNTER (EMERGENCY)
Facility: HOSPITAL | Age: 64
Discharge: HOME | End: 2024-04-13
Attending: EMERGENCY MEDICINE
Payer: COMMERCIAL

## 2024-04-13 ENCOUNTER — APPOINTMENT (OUTPATIENT)
Dept: RADIOLOGY | Facility: HOSPITAL | Age: 64
End: 2024-04-13
Payer: COMMERCIAL

## 2024-04-13 VITALS
WEIGHT: 280 LBS | HEART RATE: 88 BPM | OXYGEN SATURATION: 98 % | RESPIRATION RATE: 18 BRPM | SYSTOLIC BLOOD PRESSURE: 148 MMHG | HEIGHT: 72 IN | TEMPERATURE: 98.1 F | BODY MASS INDEX: 37.93 KG/M2 | DIASTOLIC BLOOD PRESSURE: 80 MMHG

## 2024-04-13 DIAGNOSIS — S76.312A STRAIN OF LEFT HAMSTRING, INITIAL ENCOUNTER: Primary | ICD-10-CM

## 2024-04-13 PROCEDURE — 73552 X-RAY EXAM OF FEMUR 2/>: CPT | Mod: LEFT SIDE | Performed by: RADIOLOGY

## 2024-04-13 PROCEDURE — 2500000001 HC RX 250 WO HCPCS SELF ADMINISTERED DRUGS (ALT 637 FOR MEDICARE OP): Performed by: EMERGENCY MEDICINE

## 2024-04-13 PROCEDURE — 73552 X-RAY EXAM OF FEMUR 2/>: CPT | Mod: LT

## 2024-04-13 PROCEDURE — 99283 EMERGENCY DEPT VISIT LOW MDM: CPT | Mod: 25

## 2024-04-13 RX ORDER — HYDROCODONE BITARTRATE AND ACETAMINOPHEN 5; 325 MG/1; MG/1
1 TABLET ORAL ONCE
Status: COMPLETED | OUTPATIENT
Start: 2024-04-13 | End: 2024-04-13

## 2024-04-13 RX ORDER — LIDOCAINE 50 MG/G
1 PATCH TOPICAL DAILY
Qty: 10 PATCH | Refills: 0 | Status: SHIPPED | OUTPATIENT
Start: 2024-04-13

## 2024-04-13 RX ADMIN — HYDROCODONE BITARTRATE AND ACETAMINOPHEN 1 TABLET: 5; 325 TABLET ORAL at 19:05

## 2024-04-13 ASSESSMENT — PAIN SCALES - GENERAL
PAINLEVEL_OUTOF10: 10 - WORST POSSIBLE PAIN
PAINLEVEL_OUTOF10: 4

## 2024-04-13 ASSESSMENT — PAIN DESCRIPTION - ORIENTATION: ORIENTATION: LEFT

## 2024-04-13 ASSESSMENT — LIFESTYLE VARIABLES
EVER FELT BAD OR GUILTY ABOUT YOUR DRINKING: NO
TOTAL SCORE: 0
HAVE YOU EVER FELT YOU SHOULD CUT DOWN ON YOUR DRINKING: NO
EVER HAD A DRINK FIRST THING IN THE MORNING TO STEADY YOUR NERVES TO GET RID OF A HANGOVER: NO
HAVE PEOPLE ANNOYED YOU BY CRITICIZING YOUR DRINKING: NO

## 2024-04-13 ASSESSMENT — PAIN - FUNCTIONAL ASSESSMENT: PAIN_FUNCTIONAL_ASSESSMENT: 0-10

## 2024-04-13 ASSESSMENT — PAIN DESCRIPTION - PAIN TYPE: TYPE: ACUTE PAIN

## 2024-04-13 ASSESSMENT — PAIN DESCRIPTION - LOCATION: LOCATION: LEG

## 2024-04-13 NOTE — ED PROVIDER NOTES
HPI   Chief Complaint   Patient presents with   • Fall     Pt to ER with c/o left leg pain after falling doing a cartwheel.       Patient has left posterior leg pain.  Her injury occurred prior to arrival.  She was doing a cart wheel when she landed and she started having pain in her hamstring area of her left leg.  Is worse with walking.  She denies any hip or knee pain on that left side.  She did not see any deformities.  EMS was called and they transported the patient here.  No previous surgeries to this area.                          No data recorded                Patient History   Past Medical History:   Diagnosis Date   • Hyperlipidemia    • Hypertension    • Personal history of other diseases of the circulatory system     History of hypertension   • Personal history of other endocrine, nutritional and metabolic disease     History of hyperlipidemia     Past Surgical History:   Procedure Laterality Date   • HYSTERECTOMY  08/17/2017    Hysterectomy     Family History   Problem Relation Name Age of Onset   • No Known Problems Mother     • No Known Problems Father     • Colon cancer Other       Social History     Tobacco Use   • Smoking status: Every Day     Current packs/day: 1.00     Average packs/day: 1 pack/day for 20.0 years (20.0 ttl pk-yrs)     Types: Cigarettes   • Smokeless tobacco: Never   Vaping Use   • Vaping status: Never Used   Substance Use Topics   • Alcohol use: Never   • Drug use: Never       Physical Exam   ED Triage Vitals [04/13/24 1819]   Temperature Heart Rate Respirations BP   36.7 °C (98.1 °F) (!) 120 (!) 22 162/85      Pulse Ox Temp Source Heart Rate Source Patient Position   96 % Tympanic -- Sitting      BP Location FiO2 (%)     Left arm 21 %       Physical Exam  Constitutional:       Appearance: Normal appearance.   HENT:      Head: Normocephalic and atraumatic.   Eyes:      Extraocular Movements: Extraocular movements intact.      Pupils: Pupils are equal, round, and reactive to  light.   Musculoskeletal:      Cervical back: Normal range of motion.      Comments: Left leg is tender in the musculature of the posterior left thigh.  No bony knee or hip tenderness.  Limited range of motion secondary to pain.   Skin:     General: Skin is warm and dry.   Neurological:      General: No focal deficit present.      Mental Status: She is alert and oriented to person, place, and time.      Motor: No weakness.   Psychiatric:         Mood and Affect: Mood normal.       Labs Reviewed - No data to display  XR femur left 2+ views    (Results Pending)     ED Course & MDM   Diagnoses as of 04/13/24 2018   Strain of left hamstring, initial encounter       Medical Decision Making  Differentials include muscle strain, fracture, contusion.  Imaging studies, if performed, were independently reviewed and interpreted by myself and confirmed by radiologist. EKG(s), if performed, were interpreted by myself.X-rays of the left femur were obtained, per my interpretation, 2 views shows no evidence of fracture.  There is a bony island on the medial knee which is likely chronic.  She has no tenderness at this joint.  She likely has a hamstring strain.  Give her Lidoderm patches to place on this area.  She will follow-up with her PCP next week if her symptoms persist.        Procedure  Procedures     Catrachito Villalba MD  04/13/24 2018

## 2024-04-22 ENCOUNTER — OFFICE VISIT (OUTPATIENT)
Dept: OBSTETRICS AND GYNECOLOGY | Facility: CLINIC | Age: 64
End: 2024-04-22
Payer: COMMERCIAL

## 2024-04-22 VITALS
BODY MASS INDEX: 39.68 KG/M2 | DIASTOLIC BLOOD PRESSURE: 78 MMHG | HEIGHT: 72 IN | WEIGHT: 293 LBS | SYSTOLIC BLOOD PRESSURE: 126 MMHG

## 2024-04-22 DIAGNOSIS — Z12.31 ENCOUNTER FOR SCREENING MAMMOGRAM FOR MALIGNANT NEOPLASM OF BREAST: ICD-10-CM

## 2024-04-22 DIAGNOSIS — Z13.820 ENCOUNTER FOR SCREENING FOR OSTEOPOROSIS: ICD-10-CM

## 2024-04-22 DIAGNOSIS — Z12.72 SCREENING FOR VAGINAL CANCER: Primary | ICD-10-CM

## 2024-04-22 DIAGNOSIS — F17.200 SMOKER: ICD-10-CM

## 2024-04-22 DIAGNOSIS — Z01.419 ENCOUNTER FOR ANNUAL ROUTINE GYNECOLOGICAL EXAMINATION: ICD-10-CM

## 2024-04-22 PROCEDURE — G0101 CA SCREEN;PELVIC/BREAST EXAM: HCPCS | Performed by: OBSTETRICS & GYNECOLOGY

## 2024-04-22 PROCEDURE — 3044F HG A1C LEVEL LT 7.0%: CPT | Performed by: OBSTETRICS & GYNECOLOGY

## 2024-04-22 PROCEDURE — 1123F ACP DISCUSS/DSCN MKR DOCD: CPT | Performed by: OBSTETRICS & GYNECOLOGY

## 2024-04-22 PROCEDURE — 88175 CYTOPATH C/V AUTO FLUID REDO: CPT

## 2024-04-22 PROCEDURE — 3074F SYST BP LT 130 MM HG: CPT | Performed by: OBSTETRICS & GYNECOLOGY

## 2024-04-22 PROCEDURE — 1159F MED LIST DOCD IN RCRD: CPT | Performed by: OBSTETRICS & GYNECOLOGY

## 2024-04-22 PROCEDURE — 3062F POS MACROALBUMINURIA REV: CPT | Performed by: OBSTETRICS & GYNECOLOGY

## 2024-04-22 PROCEDURE — 3008F BODY MASS INDEX DOCD: CPT | Performed by: OBSTETRICS & GYNECOLOGY

## 2024-04-22 PROCEDURE — 3078F DIAST BP <80 MM HG: CPT | Performed by: OBSTETRICS & GYNECOLOGY

## 2024-04-22 PROCEDURE — 1160F RVW MEDS BY RX/DR IN RCRD: CPT | Performed by: OBSTETRICS & GYNECOLOGY

## 2024-04-22 NOTE — PROGRESS NOTES
Subjective   Patient ID: Macarena Adler is a 64 y.o. female who presents for No chief complaint on file..  HPI 64 years old G3 prior hysterectomy presents referral from her primary care for pelvic and Pap smear.  She denies any vaginal bleeding pain or discharge.  She is not sexually active.  She is not on any hormone replacement therapy.  She says that her hysterectomy was done due to heavy no cancer.    Review of Systems   All other systems reviewed and are negative.      Objective   Physical Exam  Vitals reviewed.   Constitutional:       Appearance: Normal appearance. She is obese.   HENT:      Head: Normocephalic and atraumatic.      Nose: Nose normal.   Cardiovascular:      Rate and Rhythm: Normal rate and regular rhythm.   Pulmonary:      Effort: Pulmonary effort is normal.      Breath sounds: Normal breath sounds.   Chest:      Chest wall: No mass.   Breasts:     Right: Normal.      Left: Normal.   Abdominal:      General: Abdomen is flat. Bowel sounds are normal. There is no distension.      Palpations: Abdomen is soft. There is no mass.   Genitourinary:     General: Normal vulva.      Vagina: Normal.      Rectum: Normal.      Comments: Patient obese limiting pelvic exam but I did not feel any mass, ascities or tenderness.   Musculoskeletal:         General: Normal range of motion.      Cervical back: Normal range of motion.   Skin:     General: Skin is warm and dry.      Findings: Lesion present.      Comments: Multiple extensive moles    Neurological:      General: No focal deficit present.      Mental Status: She is alert.   Psychiatric:         Mood and Affect: Mood normal.         Behavior: Behavior normal.         Assessment/Plan   Problem List Items Addressed This Visit    None  Visit Diagnoses         Codes    Screening for vaginal cancer    -  Primary Z12.72    Relevant Orders    THINPREP PAP    Encounter for screening mammogram for malignant neoplasm of breast     Z12.31    Relevant Orders    BI  mammo bilateral screening tomosynthesis    Encounter for screening for osteoporosis     Z13.820    Relevant Orders    XR DEXA bone density    Smoker     F17.200    Relevant Orders    XR DEXA bone density    Encounter for annual routine gynecological examination     Z01.419        Pap smear vaginal cuff was done today.  She may stop doing paps or do it as needed. A mammogram was also ordered for later this year.  She was encouraged to do self breast exam monthly.  I have encouraged her to quit smoking eat healthy and stay active.  A DXA scan ordered.   I recommended dermatology referral she says her insurance wont pay and is too expensive.   Encouraged to take Vit D and calcium daily.    Follow up in 2 years or as needed.          Jay Arreola MD 04/22/24 2:06 PM

## 2024-04-29 LAB
CYTOLOGY CMNT CVX/VAG CYTO-IMP: NORMAL
LAB AP HPV GENOTYPE QUESTION: YES
LAB AP HPV HR: NORMAL
LABORATORY COMMENT REPORT: NORMAL
PATH REPORT.RELEVANT HX SPEC: NORMAL
PATH REPORT.TOTAL CANCER: NORMAL

## 2024-06-03 DIAGNOSIS — E11.9 TYPE 2 DIABETES MELLITUS WITHOUT COMPLICATION, WITHOUT LONG-TERM CURRENT USE OF INSULIN (MULTI): ICD-10-CM

## 2024-06-03 DIAGNOSIS — E78.2 HYPERLIPIDEMIA, MIXED: ICD-10-CM

## 2024-06-03 DIAGNOSIS — N95.1 HOT FLASHES DUE TO MENOPAUSE: ICD-10-CM

## 2024-06-04 RX ORDER — EMPAGLIFLOZIN 25 MG/1
25 TABLET, FILM COATED ORAL DAILY
Qty: 30 TABLET | Refills: 1 | Status: SHIPPED | OUTPATIENT
Start: 2024-06-04

## 2024-06-04 RX ORDER — PRAVASTATIN SODIUM 40 MG/1
40 TABLET ORAL DAILY
Qty: 30 TABLET | Refills: 1 | Status: SHIPPED | OUTPATIENT
Start: 2024-06-04

## 2024-06-04 RX ORDER — PAROXETINE 10 MG/1
10 TABLET, FILM COATED ORAL NIGHTLY
Qty: 30 TABLET | Refills: 1 | Status: SHIPPED | OUTPATIENT
Start: 2024-06-04

## 2024-06-06 ENCOUNTER — HOSPITAL ENCOUNTER (OUTPATIENT)
Dept: RADIOLOGY | Facility: CLINIC | Age: 64
Discharge: HOME | End: 2024-06-06
Payer: COMMERCIAL

## 2024-06-06 DIAGNOSIS — F17.200 SMOKER: ICD-10-CM

## 2024-06-06 DIAGNOSIS — Z13.820 ENCOUNTER FOR SCREENING FOR OSTEOPOROSIS: ICD-10-CM

## 2024-06-06 PROCEDURE — 77080 DXA BONE DENSITY AXIAL: CPT

## 2024-06-06 PROCEDURE — 77080 DXA BONE DENSITY AXIAL: CPT | Performed by: RADIOLOGY

## 2024-06-22 PROCEDURE — 99285 EMERGENCY DEPT VISIT HI MDM: CPT

## 2024-06-23 ENCOUNTER — APPOINTMENT (OUTPATIENT)
Dept: RADIOLOGY | Facility: HOSPITAL | Age: 64
End: 2024-06-23
Payer: COMMERCIAL

## 2024-06-23 ENCOUNTER — HOSPITAL ENCOUNTER (OUTPATIENT)
Dept: CARDIOLOGY | Facility: HOSPITAL | Age: 64
Discharge: HOME | End: 2024-06-23
Payer: COMMERCIAL

## 2024-06-23 ENCOUNTER — HOSPITAL ENCOUNTER (OUTPATIENT)
Facility: HOSPITAL | Age: 64
Setting detail: OBSERVATION
End: 2024-06-23
Attending: EMERGENCY MEDICINE | Admitting: INTERNAL MEDICINE
Payer: COMMERCIAL

## 2024-06-23 VITALS
WEIGHT: 293 LBS | HEIGHT: 72 IN | BODY MASS INDEX: 39.68 KG/M2 | HEART RATE: 70 BPM | SYSTOLIC BLOOD PRESSURE: 130 MMHG | RESPIRATION RATE: 16 BRPM | DIASTOLIC BLOOD PRESSURE: 77 MMHG | TEMPERATURE: 97.6 F | OXYGEN SATURATION: 95 %

## 2024-06-23 DIAGNOSIS — K21.00 GASTROESOPHAGEAL REFLUX DISEASE WITH ESOPHAGITIS WITHOUT HEMORRHAGE: ICD-10-CM

## 2024-06-23 DIAGNOSIS — K21.9 GASTROESOPHAGEAL REFLUX DISEASE, UNSPECIFIED WHETHER ESOPHAGITIS PRESENT: ICD-10-CM

## 2024-06-23 DIAGNOSIS — R13.19 ESOPHAGEAL DYSPHAGIA: ICD-10-CM

## 2024-06-23 DIAGNOSIS — R07.9 CHEST PAIN, UNSPECIFIED TYPE: Primary | ICD-10-CM

## 2024-06-23 DIAGNOSIS — R07.89 OTHER CHEST PAIN: ICD-10-CM

## 2024-06-23 LAB
ALBUMIN SERPL BCP-MCNC: 3.9 G/DL (ref 3.4–5)
ALP SERPL-CCNC: 121 U/L (ref 33–136)
ALT SERPL W P-5'-P-CCNC: 14 U/L (ref 7–45)
ANION GAP SERPL CALC-SCNC: 13 MMOL/L (ref 10–20)
AST SERPL W P-5'-P-CCNC: 12 U/L (ref 9–39)
BASOPHILS # BLD AUTO: 0.04 X10*3/UL (ref 0–0.1)
BASOPHILS NFR BLD AUTO: 0.4 %
BILIRUB SERPL-MCNC: 0.3 MG/DL (ref 0–1.2)
BNP SERPL-MCNC: 29 PG/ML (ref 0–99)
BUN SERPL-MCNC: 12 MG/DL (ref 6–23)
CALCIUM SERPL-MCNC: 9 MG/DL (ref 8.6–10.3)
CARDIAC TROPONIN I PNL SERPL HS: 6 NG/L (ref 0–13)
CARDIAC TROPONIN I PNL SERPL HS: 7 NG/L (ref 0–13)
CHLORIDE SERPL-SCNC: 108 MMOL/L (ref 98–107)
CHOLEST SERPL-MCNC: 127 MG/DL (ref 0–199)
CHOLESTEROL/HDL RATIO: 2.6
CO2 SERPL-SCNC: 23 MMOL/L (ref 21–32)
CREAT SERPL-MCNC: 0.99 MG/DL (ref 0.5–1.05)
EGFRCR SERPLBLD CKD-EPI 2021: 63 ML/MIN/1.73M*2
EOSINOPHIL # BLD AUTO: 0.12 X10*3/UL (ref 0–0.7)
EOSINOPHIL NFR BLD AUTO: 1.2 %
ERYTHROCYTE [DISTWIDTH] IN BLOOD BY AUTOMATED COUNT: 15.5 % (ref 11.5–14.5)
GLUCOSE BLD MANUAL STRIP-MCNC: 129 MG/DL (ref 74–99)
GLUCOSE BLD MANUAL STRIP-MCNC: 139 MG/DL (ref 74–99)
GLUCOSE BLD MANUAL STRIP-MCNC: 170 MG/DL (ref 74–99)
GLUCOSE BLD MANUAL STRIP-MCNC: 99 MG/DL (ref 74–99)
GLUCOSE SERPL-MCNC: 123 MG/DL (ref 74–99)
HCT VFR BLD AUTO: 41.2 % (ref 36–46)
HDLC SERPL-MCNC: 49.3 MG/DL
HGB BLD-MCNC: 13.4 G/DL (ref 12–16)
IMM GRANULOCYTES # BLD AUTO: 0.03 X10*3/UL (ref 0–0.7)
IMM GRANULOCYTES NFR BLD AUTO: 0.3 % (ref 0–0.9)
LDLC SERPL CALC-MCNC: 57 MG/DL
LIPASE SERPL-CCNC: 10 U/L (ref 9–82)
LYMPHOCYTES # BLD AUTO: 2.58 X10*3/UL (ref 1.2–4.8)
LYMPHOCYTES NFR BLD AUTO: 26.5 %
MAGNESIUM SERPL-MCNC: 1.91 MG/DL (ref 1.6–2.4)
MCH RBC QN AUTO: 28.7 PG (ref 26–34)
MCHC RBC AUTO-ENTMCNC: 32.5 G/DL (ref 32–36)
MCV RBC AUTO: 88 FL (ref 80–100)
MONOCYTES # BLD AUTO: 0.73 X10*3/UL (ref 0.1–1)
MONOCYTES NFR BLD AUTO: 7.5 %
NEUTROPHILS # BLD AUTO: 6.24 X10*3/UL (ref 1.2–7.7)
NEUTROPHILS NFR BLD AUTO: 64.1 %
NON HDL CHOLESTEROL: 78 MG/DL (ref 0–149)
NRBC BLD-RTO: 0 /100 WBCS (ref 0–0)
PLATELET # BLD AUTO: 248 X10*3/UL (ref 150–450)
POTASSIUM SERPL-SCNC: 3.6 MMOL/L (ref 3.5–5.3)
PROT SERPL-MCNC: 7.1 G/DL (ref 6.4–8.2)
RBC # BLD AUTO: 4.67 X10*6/UL (ref 4–5.2)
SODIUM SERPL-SCNC: 140 MMOL/L (ref 136–145)
TRIGL SERPL-MCNC: 106 MG/DL (ref 0–149)
TSH SERPL-ACNC: 1.66 MIU/L (ref 0.44–3.98)
VLDL: 21 MG/DL (ref 0–40)
WBC # BLD AUTO: 9.7 X10*3/UL (ref 4.4–11.3)

## 2024-06-23 PROCEDURE — 82947 ASSAY GLUCOSE BLOOD QUANT: CPT

## 2024-06-23 PROCEDURE — 83735 ASSAY OF MAGNESIUM: CPT | Performed by: EMERGENCY MEDICINE

## 2024-06-23 PROCEDURE — 2500000004 HC RX 250 GENERAL PHARMACY W/ HCPCS (ALT 636 FOR OP/ED): Performed by: EMERGENCY MEDICINE

## 2024-06-23 PROCEDURE — 99233 SBSQ HOSP IP/OBS HIGH 50: CPT | Performed by: FAMILY MEDICINE

## 2024-06-23 PROCEDURE — 93005 ELECTROCARDIOGRAM TRACING: CPT

## 2024-06-23 PROCEDURE — G0378 HOSPITAL OBSERVATION PER HR: HCPCS

## 2024-06-23 PROCEDURE — 99223 1ST HOSP IP/OBS HIGH 75: CPT | Performed by: INTERNAL MEDICINE

## 2024-06-23 PROCEDURE — 71046 X-RAY EXAM CHEST 2 VIEWS: CPT

## 2024-06-23 PROCEDURE — 84443 ASSAY THYROID STIM HORMONE: CPT | Performed by: INTERNAL MEDICINE

## 2024-06-23 PROCEDURE — 96376 TX/PRO/DX INJ SAME DRUG ADON: CPT

## 2024-06-23 PROCEDURE — 71275 CT ANGIOGRAPHY CHEST: CPT

## 2024-06-23 PROCEDURE — 2500000001 HC RX 250 WO HCPCS SELF ADMINISTERED DRUGS (ALT 637 FOR MEDICARE OP): Performed by: INTERNAL MEDICINE

## 2024-06-23 PROCEDURE — 2500000004 HC RX 250 GENERAL PHARMACY W/ HCPCS (ALT 636 FOR OP/ED)

## 2024-06-23 PROCEDURE — 83690 ASSAY OF LIPASE: CPT | Performed by: EMERGENCY MEDICINE

## 2024-06-23 PROCEDURE — 36415 COLL VENOUS BLD VENIPUNCTURE: CPT | Performed by: EMERGENCY MEDICINE

## 2024-06-23 PROCEDURE — 2550000001 HC RX 255 CONTRASTS: Performed by: EMERGENCY MEDICINE

## 2024-06-23 PROCEDURE — 84484 ASSAY OF TROPONIN QUANT: CPT | Performed by: EMERGENCY MEDICINE

## 2024-06-23 PROCEDURE — 96372 THER/PROPH/DIAG INJ SC/IM: CPT | Performed by: INTERNAL MEDICINE

## 2024-06-23 PROCEDURE — 80053 COMPREHEN METABOLIC PANEL: CPT | Performed by: EMERGENCY MEDICINE

## 2024-06-23 PROCEDURE — C9113 INJ PANTOPRAZOLE SODIUM, VIA: HCPCS | Performed by: INTERNAL MEDICINE

## 2024-06-23 PROCEDURE — 85025 COMPLETE CBC W/AUTO DIFF WBC: CPT | Performed by: EMERGENCY MEDICINE

## 2024-06-23 PROCEDURE — 83880 ASSAY OF NATRIURETIC PEPTIDE: CPT | Performed by: EMERGENCY MEDICINE

## 2024-06-23 PROCEDURE — 71275 CT ANGIOGRAPHY CHEST: CPT | Performed by: RADIOLOGY

## 2024-06-23 PROCEDURE — 96375 TX/PRO/DX INJ NEW DRUG ADDON: CPT

## 2024-06-23 PROCEDURE — 96374 THER/PROPH/DIAG INJ IV PUSH: CPT

## 2024-06-23 PROCEDURE — 2500000004 HC RX 250 GENERAL PHARMACY W/ HCPCS (ALT 636 FOR OP/ED): Performed by: INTERNAL MEDICINE

## 2024-06-23 PROCEDURE — 2500000001 HC RX 250 WO HCPCS SELF ADMINISTERED DRUGS (ALT 637 FOR MEDICARE OP)

## 2024-06-23 PROCEDURE — 2500000001 HC RX 250 WO HCPCS SELF ADMINISTERED DRUGS (ALT 637 FOR MEDICARE OP): Performed by: EMERGENCY MEDICINE

## 2024-06-23 PROCEDURE — 80061 LIPID PANEL: CPT | Performed by: INTERNAL MEDICINE

## 2024-06-23 PROCEDURE — 71046 X-RAY EXAM CHEST 2 VIEWS: CPT | Performed by: RADIOLOGY

## 2024-06-23 RX ORDER — PANTOPRAZOLE SODIUM 40 MG/1
40 TABLET, DELAYED RELEASE ORAL
Status: DISCONTINUED | OUTPATIENT
Start: 2024-06-24 | End: 2024-06-24 | Stop reason: HOSPADM

## 2024-06-23 RX ORDER — MORPHINE SULFATE 4 MG/ML
INJECTION INTRAVENOUS
Status: COMPLETED
Start: 2024-06-23 | End: 2024-06-23

## 2024-06-23 RX ORDER — HEPARIN SODIUM 5000 [USP'U]/ML
7500 INJECTION, SOLUTION INTRAVENOUS; SUBCUTANEOUS EVERY 8 HOURS SCHEDULED
Status: DISCONTINUED | OUTPATIENT
Start: 2024-06-23 | End: 2024-06-24 | Stop reason: HOSPADM

## 2024-06-23 RX ORDER — AMLODIPINE BESYLATE 10 MG/1
10 TABLET ORAL DAILY
Status: DISCONTINUED | OUTPATIENT
Start: 2024-06-23 | End: 2024-06-24 | Stop reason: HOSPADM

## 2024-06-23 RX ORDER — MORPHINE SULFATE 4 MG/ML
4 INJECTION INTRAVENOUS ONCE
Status: COMPLETED | OUTPATIENT
Start: 2024-06-23 | End: 2024-06-23

## 2024-06-23 RX ORDER — ASPIRIN 81 MG/1
81 TABLET ORAL DAILY
Status: DISCONTINUED | OUTPATIENT
Start: 2024-06-23 | End: 2024-06-24 | Stop reason: HOSPADM

## 2024-06-23 RX ORDER — LIDOCAINE HYDROCHLORIDE 20 MG/ML
15 SOLUTION OROPHARYNGEAL ONCE
Status: COMPLETED | OUTPATIENT
Start: 2024-06-23 | End: 2024-06-23

## 2024-06-23 RX ORDER — ATORVASTATIN CALCIUM 40 MG/1
40 TABLET, FILM COATED ORAL NIGHTLY
Status: DISCONTINUED | OUTPATIENT
Start: 2024-06-23 | End: 2024-06-24 | Stop reason: HOSPADM

## 2024-06-23 RX ORDER — ONDANSETRON HYDROCHLORIDE 2 MG/ML
4 INJECTION, SOLUTION INTRAVENOUS EVERY 6 HOURS PRN
Status: DISCONTINUED | OUTPATIENT
Start: 2024-06-23 | End: 2024-06-24 | Stop reason: HOSPADM

## 2024-06-23 RX ORDER — PANTOPRAZOLE SODIUM 40 MG/1
40 TABLET, DELAYED RELEASE ORAL
Status: DISCONTINUED | OUTPATIENT
Start: 2024-06-24 | End: 2024-06-23

## 2024-06-23 RX ORDER — PANTOPRAZOLE SODIUM 40 MG/10ML
40 INJECTION, POWDER, LYOPHILIZED, FOR SOLUTION INTRAVENOUS ONCE
Status: COMPLETED | OUTPATIENT
Start: 2024-06-23 | End: 2024-06-23

## 2024-06-23 RX ORDER — ONDANSETRON HYDROCHLORIDE 2 MG/ML
4 INJECTION, SOLUTION INTRAVENOUS ONCE
Status: COMPLETED | OUTPATIENT
Start: 2024-06-23 | End: 2024-06-23

## 2024-06-23 RX ORDER — ACETAMINOPHEN 325 MG/1
650 TABLET ORAL EVERY 4 HOURS PRN
Status: DISCONTINUED | OUTPATIENT
Start: 2024-06-23 | End: 2024-06-24 | Stop reason: HOSPADM

## 2024-06-23 RX ORDER — INSULIN LISPRO 100 [IU]/ML
0-5 INJECTION, SOLUTION INTRAVENOUS; SUBCUTANEOUS
Status: DISCONTINUED | OUTPATIENT
Start: 2024-06-23 | End: 2024-06-24 | Stop reason: HOSPADM

## 2024-06-23 RX ORDER — FAMOTIDINE 10 MG/ML
40 INJECTION INTRAVENOUS ONCE
Status: COMPLETED | OUTPATIENT
Start: 2024-06-23 | End: 2024-06-23

## 2024-06-23 RX ORDER — AMINOPHYLLINE 25 MG/ML
125 INJECTION, SOLUTION INTRAVENOUS AS NEEDED
Status: DISCONTINUED | OUTPATIENT
Start: 2024-06-23 | End: 2024-06-24 | Stop reason: HOSPADM

## 2024-06-23 RX ORDER — ALUMINUM HYDROXIDE, MAGNESIUM HYDROXIDE, AND SIMETHICONE 1200; 120; 1200 MG/30ML; MG/30ML; MG/30ML
30 SUSPENSION ORAL ONCE
Status: COMPLETED | OUTPATIENT
Start: 2024-06-23 | End: 2024-06-23

## 2024-06-23 RX ORDER — DEXTROSE 50 % IN WATER (D50W) INTRAVENOUS SYRINGE
25
Status: DISCONTINUED | OUTPATIENT
Start: 2024-06-23 | End: 2024-06-24 | Stop reason: HOSPADM

## 2024-06-23 RX ORDER — REGADENOSON 0.08 MG/ML
0.4 INJECTION, SOLUTION INTRAVENOUS
Status: COMPLETED | OUTPATIENT
Start: 2024-06-23 | End: 2024-06-24

## 2024-06-23 RX ORDER — PAROXETINE 10 MG/1
10 TABLET, FILM COATED ORAL NIGHTLY
Status: DISCONTINUED | OUTPATIENT
Start: 2024-06-23 | End: 2024-06-24 | Stop reason: HOSPADM

## 2024-06-23 RX ORDER — NITROGLYCERIN 0.4 MG/1
0.4 TABLET SUBLINGUAL ONCE
Status: COMPLETED | OUTPATIENT
Start: 2024-06-23 | End: 2024-06-23

## 2024-06-23 RX ORDER — DEXTROSE 50 % IN WATER (D50W) INTRAVENOUS SYRINGE
12.5
Status: DISCONTINUED | OUTPATIENT
Start: 2024-06-23 | End: 2024-06-24 | Stop reason: HOSPADM

## 2024-06-23 RX ORDER — NITROGLYCERIN 0.4 MG/1
TABLET SUBLINGUAL
Status: COMPLETED
Start: 2024-06-23 | End: 2024-06-23

## 2024-06-23 RX ADMIN — PAROXETINE 10 MG: 10 TABLET, FILM COATED ORAL at 21:14

## 2024-06-23 RX ADMIN — ASPIRIN 81 MG: 81 TABLET, COATED ORAL at 09:44

## 2024-06-23 RX ADMIN — FAMOTIDINE 40 MG: 10 INJECTION, SOLUTION INTRAVENOUS at 05:00

## 2024-06-23 RX ADMIN — HEPARIN SODIUM 7500 UNITS: 5000 INJECTION INTRAVENOUS; SUBCUTANEOUS at 21:14

## 2024-06-23 RX ADMIN — MORPHINE SULFATE 4 MG: 4 INJECTION INTRAVENOUS at 02:15

## 2024-06-23 RX ADMIN — LIDOCAINE HYDROCHLORIDE 15 ML: 20 SOLUTION ORAL at 05:00

## 2024-06-23 RX ADMIN — HEPARIN SODIUM 7500 UNITS: 5000 INJECTION INTRAVENOUS; SUBCUTANEOUS at 17:26

## 2024-06-23 RX ADMIN — ONDANSETRON 4 MG: 2 INJECTION INTRAMUSCULAR; INTRAVENOUS at 08:09

## 2024-06-23 RX ADMIN — NITROGLYCERIN 0.4 MG: 0.4 TABLET SUBLINGUAL at 00:22

## 2024-06-23 RX ADMIN — ALUMINUM HYDROXIDE, MAGNESIUM HYDROXIDE, AND DIMETHICONE 30 ML: 200; 20; 200 SUSPENSION ORAL at 05:00

## 2024-06-23 RX ADMIN — ONDANSETRON 4 MG: 2 INJECTION INTRAMUSCULAR; INTRAVENOUS at 02:16

## 2024-06-23 RX ADMIN — ATORVASTATIN CALCIUM 40 MG: 40 TABLET, FILM COATED ORAL at 21:14

## 2024-06-23 RX ADMIN — AMLODIPINE BESYLATE 10 MG: 10 TABLET ORAL at 09:44

## 2024-06-23 RX ADMIN — NITROGLYCERIN 0.4 MG: 0.4 TABLET SUBLINGUAL at 00:28

## 2024-06-23 RX ADMIN — IOHEXOL 75 ML: 350 INJECTION, SOLUTION INTRAVENOUS at 02:30

## 2024-06-23 RX ADMIN — PANTOPRAZOLE SODIUM 40 MG: 40 INJECTION, POWDER, FOR SOLUTION INTRAVENOUS at 06:53

## 2024-06-23 RX ADMIN — NITROGLYCERIN 0.4 MG: 0.4 TABLET SUBLINGUAL at 00:34

## 2024-06-23 RX ADMIN — MORPHINE SULFATE 4 MG: 4 INJECTION INTRAVENOUS at 01:15

## 2024-06-23 RX ADMIN — HEPARIN SODIUM 7500 UNITS: 5000 INJECTION INTRAVENOUS; SUBCUTANEOUS at 09:44

## 2024-06-23 ASSESSMENT — ENCOUNTER SYMPTOMS
FATIGUE: 1
WEAKNESS: 0
DIARRHEA: 0
CONFUSION: 0
TREMORS: 0
SORE THROAT: 0
HEMATURIA: 0
NAUSEA: 1
BLOOD IN STOOL: 0
UNEXPECTED WEIGHT CHANGE: 0
SHORTNESS OF BREATH: 0
DYSURIA: 0
ABDOMINAL DISTENTION: 0
NERVOUS/ANXIOUS: 0
SEIZURES: 0
DIFFICULTY URINATING: 0
CHILLS: 0
SINUS PRESSURE: 0
LIGHT-HEADEDNESS: 0
NECK STIFFNESS: 0
ARTHRALGIAS: 0
SINUS PAIN: 0
VOICE CHANGE: 0
CONSTIPATION: 0
DIZZINESS: 0
NUMBNESS: 0
DIAPHORESIS: 0
CHEST TIGHTNESS: 1
HEADACHES: 0
WOUND: 0
RHINORRHEA: 0
FEVER: 0
COUGH: 0
PHOTOPHOBIA: 0
FLANK PAIN: 0
FREQUENCY: 0
SPEECH DIFFICULTY: 0
WHEEZING: 0
NECK PAIN: 0
MYALGIAS: 0
PALPITATIONS: 0
VOMITING: 0
APPETITE CHANGE: 0
ACTIVITY CHANGE: 1
ABDOMINAL PAIN: 0
JOINT SWELLING: 0
DECREASED CONCENTRATION: 0
TROUBLE SWALLOWING: 0
FACIAL ASYMMETRY: 0
BACK PAIN: 0

## 2024-06-23 ASSESSMENT — COGNITIVE AND FUNCTIONAL STATUS - GENERAL
DAILY ACTIVITIY SCORE: 24
MOBILITY SCORE: 24
DAILY ACTIVITIY SCORE: 24
MOBILITY SCORE: 24

## 2024-06-23 ASSESSMENT — PAIN DESCRIPTION - ORIENTATION: ORIENTATION: RIGHT

## 2024-06-23 ASSESSMENT — PAIN DESCRIPTION - LOCATION
LOCATION: CHEST

## 2024-06-23 ASSESSMENT — PAIN SCALES - GENERAL
PAINLEVEL_OUTOF10: 10 - WORST POSSIBLE PAIN
PAINLEVEL_OUTOF10: 0 - NO PAIN
PAINLEVEL_OUTOF10: 10 - WORST POSSIBLE PAIN

## 2024-06-23 ASSESSMENT — LIFESTYLE VARIABLES
HAVE YOU EVER FELT YOU SHOULD CUT DOWN ON YOUR DRINKING: NO
EVER HAD A DRINK FIRST THING IN THE MORNING TO STEADY YOUR NERVES TO GET RID OF A HANGOVER: NO
HAVE PEOPLE ANNOYED YOU BY CRITICIZING YOUR DRINKING: NO
EVER FELT BAD OR GUILTY ABOUT YOUR DRINKING: NO
TOTAL SCORE: 0

## 2024-06-23 ASSESSMENT — COLUMBIA-SUICIDE SEVERITY RATING SCALE - C-SSRS
6. HAVE YOU EVER DONE ANYTHING, STARTED TO DO ANYTHING, OR PREPARED TO DO ANYTHING TO END YOUR LIFE?: NO
1. IN THE PAST MONTH, HAVE YOU WISHED YOU WERE DEAD OR WISHED YOU COULD GO TO SLEEP AND NOT WAKE UP?: NO
2. HAVE YOU ACTUALLY HAD ANY THOUGHTS OF KILLING YOURSELF?: NO

## 2024-06-23 ASSESSMENT — PAIN - FUNCTIONAL ASSESSMENT
PAIN_FUNCTIONAL_ASSESSMENT: 0-10
PAIN_FUNCTIONAL_ASSESSMENT: 0-10

## 2024-06-23 ASSESSMENT — PAIN DESCRIPTION - PROGRESSION: CLINICAL_PROGRESSION: NOT CHANGED

## 2024-06-23 ASSESSMENT — PAIN DESCRIPTION - DESCRIPTORS: DESCRIPTORS: CRUSHING

## 2024-06-23 NOTE — CONSULTS
Inpatient consult to Cardiology  Consult performed by: Bridget Baez MD  Consult ordered by: Kyaw Luciano DO  Reason for consult: Chest pain        History Of Present Illness:    Macarena Adler is a 64 y.o. female presenting with central chest pain for 2 days.  She states the pain gets worse whenever she lies back and improves when she sits up.  It also worse with any kind of movement and eating and drinking.  She has been sick in the stomach and has thrown up twice since being here.    She has no prior history of heart disease but other comorbidities include hypertension, hyperlipidemia, NIDDM 2, tobacco use disorder, anxiety, depression, obesity.    Family history pertinent for CHF and siblings.  ECG personally reviewed shows sinus rhythm no ST-T changes cardiac enzymes are negative.    She states she is symptom-free as long as she is sitting up.  She describes the pain as a heartburn type symptoms.  Last Recorded Vitals:  Vitals:    06/23/24 0354 06/23/24 0431 06/23/24 0534 06/23/24 0732   BP: 145/78 138/73 163/85 134/77   BP Location:   Left arm Right arm   Patient Position:   Lying Lying   Pulse: 90 88 90 87   Resp: 16 16 16 16   Temp:   36.5 °C (97.7 °F) 36.3 °C (97.3 °F)   TempSrc:   Temporal Temporal   SpO2: (!) 93% (!) 92% 92% 93%   Weight:       Height:           Last Labs:  CBC - 6/23/2024: 12:15 AM  9.7 13.4 248    41.2      CMP - 6/23/2024: 12:15 AM  9.0 7.1 12 --- 0.3   _ 3.9 14 121      PTT - No results in last year.  _   _ _     Troponin I, High Sensitivity   Date/Time Value Ref Range Status   06/23/2024 01:14 AM 7 0 - 13 ng/L Final   06/23/2024 12:15 AM 6 0 - 13 ng/L Final     BNP   Date/Time Value Ref Range Status   06/23/2024 12:15 AM 29 0 - 99 pg/mL Final     Hemoglobin A1C   Date/Time Value Ref Range Status   04/04/2024 12:43 PM 6.8 (H) see below % Final   11/15/2023 01:36 PM 6.5 (H) see below % Final     LDL Calculated   Date/Time Value Ref Range Status   06/23/2024 12:15 AM 57 <=99  "mg/dL Final     Comment:                                 Near   Borderline      AGE      Desirable  Optimal    High     High     Very High     0-19 Y     0 - 109     ---    110-129   >/= 130     ----    20-24 Y     0 - 119     ---    120-159   >/= 160     ----      >24 Y     0 -  99   100-129  130-159   160-189     >/=190     11/15/2023 01:36 PM 98 <=99 mg/dL Final     Comment:                                 Near   Borderline      AGE      Desirable  Optimal    High     High     Very High     0-19 Y     0 - 109     ---    110-129   >/= 130     ----    20-24 Y     0 - 119     ---    120-159   >/= 160     ----      >24 Y     0 -  99   100-129  130-159   160-189     >/=190       VLDL   Date/Time Value Ref Range Status   06/23/2024 12:15 AM 21 0 - 40 mg/dL Final   11/15/2023 01:36 PM 24 0 - 40 mg/dL Final   04/22/2022 10:44 AM 24 0 - 40 mg/dL Final   04/09/2021 11:56 AM 25 0 - 40 mg/dL Final      Last I/O:  No intake/output data recorded.    Past Cardiology Tests (Last 3 Years):  EKG:  ECG 12 Lead     Echo:  No results found for this or any previous visit from the past 1095 days.    Ejection Fractions:  No results found for: \"EF\"  Cath:  No results found for this or any previous visit from the past 1095 days.    Stress Test:  No results found for this or any previous visit from the past 1095 days.    Cardiac Imaging:  No results found for this or any previous visit from the past 1095 days.      Past Medical History:  She has a past medical history of Diabetes (Multi), Hyperlipidemia, Hypertension, Personal history of other diseases of the circulatory system, and Personal history of other endocrine, nutritional and metabolic disease.    Past Surgical History:  She has a past surgical history that includes Hysterectomy (08/17/2017).      Social History:  She reports that she has been smoking cigarettes. She started smoking about 38 years ago. She has a 39.2 pack-year smoking history. She has never used smokeless tobacco. " She reports that she does not drink alcohol and does not use drugs.    Family History:  Family History   Problem Relation Name Age of Onset    Colon cancer Mother      No Known Problems Father      Colon cancer Other          Allergies:  Patient has no known allergies.    Inpatient Medications:  Scheduled medications   Medication Dose Route Frequency    amLODIPine  10 mg oral Daily    aspirin  81 mg oral Daily    atorvastatin  40 mg oral Nightly    heparin (porcine)  7,500 Units subcutaneous q8h PARTH    insulin lispro  0-5 Units subcutaneous TID    [START ON 6/24/2024] pantoprazole  40 mg oral Daily before breakfast    PARoxetine  10 mg oral Nightly     PRN medications   Medication    acetaminophen    dextrose    dextrose    glucagon    glucagon    ondansetron    promethazine     Continuous Medications   Medication Dose Last Rate     Outpatient Medications:  Current Outpatient Medications   Medication Instructions    alcohol swabs (BD Alcohol Swabs) pads, medicated TESTING ONCE DAILY    amLODIPine (NORVASC) 10 mg, oral, Daily    cholecalciferol (VITAMIN D-3) 25 mcg, oral, Daily    Jardiance 25 mg, oral, Daily    lidocaine (Lidoderm) 5 % patch 1 patch, transdermal, Daily, Remove & discard patch within 12 hours or as directed by MD.    PARoxetine (PAXIL) 10 mg, oral, Nightly    pravastatin (PRAVACHOL) 40 mg, oral, Daily    True Metrix Glucose Test Strip strip 1 strip, 2 times daily    Unilet Lancet 33 gauge misc USE AS DIRECTED ONCE DAILY       Physical Exam:  Physical Exam  Vitals reviewed.   Constitutional:       Appearance: Normal appearance.   Neck:      Vascular: No carotid bruit or JVD.   Cardiovascular:      Rate and Rhythm: Normal rate and regular rhythm.      Pulses: Normal pulses.      Heart sounds: Normal heart sounds, S1 normal and S2 normal.   Pulmonary:      Effort: Pulmonary effort is normal. No respiratory distress.      Breath sounds: No wheezing or rales.   Abdominal:      General: Abdomen is flat.       Palpations: Abdomen is soft.   Musculoskeletal:      Right lower leg: No edema.      Left lower leg: No edema.   Skin:     General: Skin is warm.   Neurological:      Mental Status: She is alert and oriented to person, place, and time. Mental status is at baseline.   Psychiatric:         Mood and Affect: Mood normal.         Behavior: Behavior normal.           Assessment/Plan   1-chest pain-very atypical chest pain consistent with gastroenteritis or esophagitis or upper GI etiology.  Very unlikely to be cardiac.  Patient has multiple cardiovascular risk factors and recommend ongoing risk factor modification and stress test when she is stable and follow-up through PCP for risk factor modification.    2-tobacco use-smoking cessation counseling was done she is motivated to quit.      Peripheral IV 06/22/24 22 G Right;Anterior Wrist (Active)   Site Assessment Clean;Intact;Dry 06/23/24 0010   Number of days: 1       Peripheral IV 06/23/24 20 G Right Antecubital (Active)   Site Assessment Clean;Dry;Intact 06/23/24 0207   Line Status Blood return noted 06/23/24 0207   Number of days: 0       Code Status:  Full Code          Bridget Baez MD

## 2024-06-23 NOTE — H&P
[facility] - GENERAL MEDICINE HISTORY AND PHYSICAL    History Obtained From: Patient, Discussion with the ED Team & Chart Review    History Of Present Illness:  Macarena Adler is a 64 y.o. -American female with a past medical history significant for hypertension, hyperlipidemia, NIDDM 2, tobacco use disorder, anxiety, depression, obesity and chronic lower back pain.  Patient presented to the ED with progressively worsening midsternal/right-sided chest pain that started about 2 days ago.  She states that the symptoms are much worse with any type of activity similar also present at rest secondary to constant sensation that is worsened with eating and drinking.  She describes a burning-like sensation but also noted a pressure-like sensation in the lower midsternal/epigastric area that did not necessarily radiate into her neck/back/upper extremities.  She states that it also feels like a sensation of the food getting stuck or moving through slowly. She denies any inciting events.  She denies any recent sick contacts, chemical/environmental exposures, changes in dietary habits or any recent traumatic events/falls other than noted above.  She denies any fevers, chills, night sweats, vision changes, auditory changes, change in taste/smell, loss of bowel/bladder contro, loss of consciousness, dizziness, vertigo, syncope, seizure-like activity, palpitations, shortness of breath, coughing, wheezing or congestion, hemoptysis, hematemesis, abdominal pain, vomiting, diarrhea, constipation, dysuria, hematuria, dyschezia, hematochezia or any lateralizing motor/sensory deficits other than noted above.    ED Course (Summary):   Vitals on presentation: Temperature 36.2 °C, heart rate of 118, respirations 16, blood pressure 165/83, pulse ox of 95% on room air  Should be noted that the patient's heart rate was initially as high as 124 within the first hour of presentation but since then has decreased to less than  90s from mid 80s to high 90s.  EKG: Sinus rhythm, borderline T wave abnormalities, heart rate of 101, , QRS of 93, QTc of 484 without any acute ST segment changes and appears similar to prior EKGs  Wbc = 9.7 -->  Hgb/Hct = 13.4/41.2  Glucose = 123  Sodium = 140  Potassium = 3.6  BUN/Creatinine = 12/0.99  Lipid Panel as noted below  BNP = 29  HSTI = 6 --> 7  TSH = 1.66  CXR = noted no evidence of acute cardiopulmonary processes.  CTA chest = noted no acute pulmonary emboli, body habitus limiting sensitivity, thick-walled esophagus in the midportion probably reflecting a component of mild esophagitis, slight hepatic steatosis    ED Course (From Provider):  Diagnoses as of 06/23/24 0521   Chest pain, unspecified type     Relevant Results  Results for orders placed or performed during the hospital encounter of 06/23/24 (from the past 24 hour(s))   CBC and Auto Differential   Result Value Ref Range    WBC 9.7 4.4 - 11.3 x10*3/uL    nRBC 0.0 0.0 - 0.0 /100 WBCs    RBC 4.67 4.00 - 5.20 x10*6/uL    Hemoglobin 13.4 12.0 - 16.0 g/dL    Hematocrit 41.2 36.0 - 46.0 %    MCV 88 80 - 100 fL    MCH 28.7 26.0 - 34.0 pg    MCHC 32.5 32.0 - 36.0 g/dL    RDW 15.5 (H) 11.5 - 14.5 %    Platelets 248 150 - 450 x10*3/uL    Neutrophils % 64.1 40.0 - 80.0 %    Immature Granulocytes %, Automated 0.3 0.0 - 0.9 %    Lymphocytes % 26.5 13.0 - 44.0 %    Monocytes % 7.5 2.0 - 10.0 %    Eosinophils % 1.2 0.0 - 6.0 %    Basophils % 0.4 0.0 - 2.0 %    Neutrophils Absolute 6.24 1.20 - 7.70 x10*3/uL    Immature Granulocytes Absolute, Automated 0.03 0.00 - 0.70 x10*3/uL    Lymphocytes Absolute 2.58 1.20 - 4.80 x10*3/uL    Monocytes Absolute 0.73 0.10 - 1.00 x10*3/uL    Eosinophils Absolute 0.12 0.00 - 0.70 x10*3/uL    Basophils Absolute 0.04 0.00 - 0.10 x10*3/uL   Comprehensive metabolic panel   Result Value Ref Range    Glucose 123 (H) 74 - 99 mg/dL    Sodium 140 136 - 145 mmol/L    Potassium 3.6 3.5 - 5.3 mmol/L    Chloride 108 (H) 98 - 107  mmol/L    Bicarbonate 23 21 - 32 mmol/L    Anion Gap 13 10 - 20 mmol/L    Urea Nitrogen 12 6 - 23 mg/dL    Creatinine 0.99 0.50 - 1.05 mg/dL    eGFR 63 >60 mL/min/1.73m*2    Calcium 9.0 8.6 - 10.3 mg/dL    Albumin 3.9 3.4 - 5.0 g/dL    Alkaline Phosphatase 121 33 - 136 U/L    Total Protein 7.1 6.4 - 8.2 g/dL    AST 12 9 - 39 U/L    Bilirubin, Total 0.3 0.0 - 1.2 mg/dL    ALT 14 7 - 45 U/L   Magnesium   Result Value Ref Range    Magnesium 1.91 1.60 - 2.40 mg/dL   Lipase   Result Value Ref Range    Lipase 10 9 - 82 U/L   B-Type Natriuretic Peptide   Result Value Ref Range    BNP 29 0 - 99 pg/mL   Troponin I, High Sensitivity, Initial   Result Value Ref Range    Troponin I, High Sensitivity 6 0 - 13 ng/L   Lipid Panel   Result Value Ref Range    Cholesterol 127 0 - 199 mg/dL    HDL-Cholesterol 49.3 mg/dL    Cholesterol/HDL Ratio 2.6     LDL Calculated 57 <=99 mg/dL    VLDL 21 0 - 40 mg/dL    Triglycerides 106 0 - 149 mg/dL    Non HDL Cholesterol 78 0 - 149 mg/dL   Troponin, High Sensitivity, 1 Hour   Result Value Ref Range    Troponin I, High Sensitivity 7 0 - 13 ng/L   TSH with reflex to Free T4 if abnormal   Result Value Ref Range    Thyroid Stimulating Hormone 1.66 0.44 - 3.98 mIU/L      CT angio chest for pulmonary embolism    Result Date: 6/23/2024  Interpreted By:  Doug Greenwood, STUDY: CT ANGIO CHEST FOR PULMONARY EMBOLISM;  6/23/2024 2:43 am   INDICATION: Signs/Symptoms:cp, tachycardia.   COMPARISON: None   ACCESSION NUMBER(S): [id number]   ORDERING CLINICIAN: PAIGE MORROW   TECHNIQUE: Helical data acquisition of the chest was obtained after intravenous administration of 75 ML Omnipaque 350, as per PE protocol. Images were reformatted in coronal and sagittal planes. Axial and coronal maximum intensity projection (MIP) images were created and reviewed.   FINDINGS: POTENTIAL LIMITATIONS OF THE STUDY: Body habitus limits sensitivity   HEART AND VESSELS: There are no discrete filling defects within main pulmonary  artery and its branches to suggest acute pulmonary embolism. Main pulmonary artery and its branches are normal in caliber.   The thoracic aorta normal in course and caliber. No coronary artery calcifications are seen. Please note, the study is not optimized for evaluation of coronary arteries.   The cardiac chambers are not enlarged.   There is no pericardial effusion seen.   MEDIASTINUM AND UDAY, LOWER NECK AND AXILLA: The visualized thyroid gland is within normal limits. No evidence of thoracic lymphadenopathy by CT criteria. Small sliding hernia with a thick-walled midthoracic esophagus presumably reflects component of esophagitis. Direct visualization may be of diagnostic benefit   LUNGS AND AIRWAYS: No localizing infiltrate. Mild bibasilar atelectasis.   The bilateral lungs are clear without evidence of focal consolidation, pleural effusion, or pneumothorax.     Hepatic steatosis. Cholecystectomy changes.   CHEST WALL AND OSSEOUS STRUCTURES: Chest wall is within normal limits. No acute osseous pathology.There are no suspicious osseous lesions.       1. No evidence of acute pulmonary embolism. Body habitus limits sensitivity. Thick-walled esophagus in the midportion probably reflects a component of mild esophagitis.   Slight hepatic steatosis   MACRO: None   Signed by: Doug Greenwood 6/23/2024 3:31 AM Dictation workstation:   [id number]    XR chest 2 views    Result Date: 6/23/2024  Interpreted By:  Doug Greenwood, STUDY: XR CHEST 2 VIEWS;  6/23/2024 1:39 am   INDICATION: Signs/Symptoms:cp.   COMPARISON: None.   ACCESSION NUMBER(S): [id number]   ORDERING CLINICIAN: PAIGE MORROW   FINDINGS:         CARDIOMEDIASTINAL SILHOUETTE: Cardiomediastinal silhouette is normal in size and configuration.   LUNGS: Bronchial thickening. No focal infiltrate.   ABDOMEN: No remarkable upper abdominal findings.   BONES: No acute osseous changes.       1.  No evidence of acute cardiopulmonary process.       MACRO: None   Signed  by: Doug Timo 6/23/2024 2:28 AM Dictation workstation:   [id number]    Scheduled medications:  amLODIPine, 10 mg, oral, Daily  aspirin, 81 mg, oral, Daily  atorvastatin, 40 mg, oral, Nightly  heparin (porcine), 7,500 Units, subcutaneous, q8h PARTH  insulin lispro, 0-5 Units, subcutaneous, TID  PARoxetine, 10 mg, oral, Nightly      Continuous medications:     PRN medications:  PRN medications: acetaminophen, dextrose, dextrose, glucagon, glucagon, ondansetron, promethazine     Past Medical History  She has a past medical history of Diabetes (Multi), Hyperlipidemia, Hypertension, Personal history of other diseases of the circulatory system, and Personal history of other endocrine, nutritional and metabolic disease.    Surgical History  She has a past surgical history that includes Hysterectomy (08/17/2017).     Social History  She reports that she has been smoking cigarettes. She started smoking about 38 years ago. She has a 39.2 pack-year smoking history. She has never used smokeless tobacco. She reports that she does not drink alcohol and does not use drugs.    Family History  Family History   Problem Relation Name Age of Onset    Colon cancer Mother      No Known Problems Father      Colon cancer Other         Allergies  Patient has no known allergies.    Code Status  Full Code     Review of Systems   Constitutional:  Positive for activity change and fatigue. Negative for appetite change, chills, diaphoresis, fever and unexpected weight change.   HENT:  Negative for congestion, drooling, ear pain, hearing loss, postnasal drip, rhinorrhea, sinus pressure, sinus pain, sneezing, sore throat, tinnitus, trouble swallowing and voice change.    Eyes:  Negative for photophobia and visual disturbance.   Respiratory:  Positive for chest tightness. Negative for cough, shortness of breath and wheezing.    Cardiovascular:  Positive for chest pain. Negative for palpitations and leg swelling.   Gastrointestinal:  Positive  for nausea. Negative for abdominal distention, abdominal pain, blood in stool, constipation, diarrhea and vomiting.   Genitourinary:  Negative for decreased urine volume, difficulty urinating, dysuria, flank pain, frequency, hematuria and urgency.   Musculoskeletal:  Negative for arthralgias, back pain, gait problem, joint swelling, myalgias, neck pain and neck stiffness.   Skin:  Negative for wound.   Neurological:  Negative for dizziness, tremors, seizures, syncope, facial asymmetry, speech difficulty, weakness, light-headedness, numbness and headaches.   Psychiatric/Behavioral:  Negative for confusion and decreased concentration. The patient is not nervous/anxious.    All other systems reviewed and are negative.      Last Recorded Vitals  /73   Pulse 88   Temp 36.2 °C (97.2 °F) (Skin)   Resp 16   Wt 138 kg (304 lb)   SpO2 (!) 92%      Physical Exam  Vitals and nursing note reviewed.   Constitutional:       General: She is not in acute distress.     Appearance: She is obese. She is not ill-appearing or diaphoretic.   HENT:      Head: Normocephalic and atraumatic.      Mouth/Throat:      Mouth: Mucous membranes are moist.      Pharynx: Oropharynx is clear.   Eyes:      Extraocular Movements: Extraocular movements intact.      Conjunctiva/sclera: Conjunctivae normal.      Pupils: Pupils are equal, round, and reactive to light.   Neck:      Vascular: No carotid bruit.   Cardiovascular:      Rate and Rhythm: Normal rate and regular rhythm.      Pulses: Normal pulses.      Heart sounds: No murmur heard.  Pulmonary:      Effort: No respiratory distress.      Breath sounds: Normal breath sounds. No wheezing, rhonchi or rales.   Chest:      Chest wall: No tenderness.   Abdominal:      General: Abdomen is flat. Bowel sounds are normal. There is no distension.      Palpations: Abdomen is soft. There is no mass.      Tenderness: There is no abdominal tenderness. There is no right CVA tenderness, left CVA  tenderness, guarding or rebound.   Musculoskeletal:         General: No deformity or signs of injury. Normal range of motion.      Cervical back: Normal range of motion and neck supple. No rigidity or tenderness.      Right lower leg: No edema.      Left lower leg: No edema.   Lymphadenopathy:      Cervical: No cervical adenopathy.   Skin:     General: Skin is warm.      Capillary Refill: Capillary refill takes less than 2 seconds.      Findings: No bruising, erythema, lesion or rash.   Neurological:      General: No focal deficit present.      Mental Status: She is alert and oriented to person, place, and time. Mental status is at baseline.      Cranial Nerves: No cranial nerve deficit.      Sensory: No sensory deficit.      Motor: No weakness.      Coordination: Coordination normal.      Gait: Gait normal.   Psychiatric:         Mood and Affect: Mood normal.         Behavior: Behavior normal.         Thought Content: Thought content normal.         Judgment: Judgment normal.       Assessment/Plan   Principal Problem:    Chest pain    Chest Pain  Hypertension  Hyperlipidemia  -concerning for cardiac pain vs esophagitis vs another unclear source at this time  -will need to rule cardiac source   -Cardiology Consult appreciated  -Telemetry Monitoring   -Lipid Panel, TSH w/ Reflex to Free T4 if Abnml =as noted above  -Hgb A1c = pending  -continued on home antihypertensives and adjust accordingly   -ASA/Statin    Esophagitis   -possible etiology to above given description  -will start on Protonix and assess response  -if persistent and a negative Cardiac workup then may need to be evaluated by GI in the inpatient to see if EGD is warranted    Obesity   -BMI = 40.11  -counseled on closely monitored Exercise/Diet regimen under the direction of primary care team and dietitian     NIDDM2  -holding home oral agents  -Hgb A1c = 6.8 (04/04/24) -->  -ISS AC/HS   -Diabetic Diet  -Hypoglycemia protocol     Kyaw Luciano,  DO    Dragon dictation software was used to dictate this note and thus there may be minor errors in translation/transcription including garbled speech or misspellings. Please contact for clarification if needed.

## 2024-06-23 NOTE — CARE PLAN
The patient's goals for the shift include  Walking with improved pain control throughout shift.    The clinical goals for the shift include Patient will have a pain level of <4/10 by end of shift.    Over the shift, the patient did not make progress toward the following goals. Barriers to progression include intractable pain, threshold for pain. Recommendations to address these barriers include deep breathing, keeping head at 30 degrees or higher.    Problem: Pain  Goal: Turns in bed with improved pain control throughout the shift  Outcome: Not Progressing  Goal: Walks with improved pain control throughout the shift  Outcome: Not Progressing     Problem: Pain  Goal: Takes deep breaths with improved pain control throughout the shift  Outcome: Progressing  Goal: Turns in bed with improved pain control throughout the shift

## 2024-06-23 NOTE — PROGRESS NOTES
Macarena Adler is a 64 y.o. female admitted for Chest pain. Pharmacy reviewed the patient's [id number] medications and allergies for accuracy.    The list below reflects the PTA list prior to pharmacy medication history. A summary a changes to the PTA medication list has been listed below. Please review each medication in order reconciliation for additional clarification and justification.    Source of information:    Medications added:    Medications modified:    Medications to be removed:  LIDOCAINE PATCH    Medications of concern:      Prior to Admission Medications   Prescriptions Last Dose Informant Patient Reported? Taking?   PARoxetine (Paxil) 10 mg tablet   No No   Sig: TAKE 1 TABLET BY MOUTH AT BEDTIME   True Metrix Glucose Test Strip strip   No No   Sig: USE 1 STRIP TWICE DAILY.   Unilet Lancet 33 gauge misc   No No   Sig: USE AS DIRECTED ONCE DAILY   alcohol swabs (BD Alcohol Swabs) pads, medicated   No No   Sig: TESTING ONCE DAILY   amLODIPine (Norvasc) 10 mg tablet   No No   Sig: Take 1 tablet (10 mg) by mouth once daily.   cholecalciferol (Vitamin D-3) 25 MCG (1000 UT) capsule   No No   Sig: Take 1 capsule (25 mcg) by mouth once daily.   empagliflozin (Jardiance) 25 mg   No No   Sig: TAKE 1 TABLET BY MOUTH ONCE DAILY   lidocaine (Lidoderm) 5 % patch   No No   Sig: Place 1 patch over 12 hours on the skin once daily. Remove & discard patch within 12 hours or as directed by MD.   pravastatin (Pravachol) 40 mg tablet   No No   Sig: TAKE 1 TABLET (40mg) BY MOUTH ONCE DAILY      Facility-Administered Medications: None       Sherri Cortez

## 2024-06-23 NOTE — ED PROVIDER NOTES
Macarena Adler is a 64 y.o. patient presenting to the ED for chest pain.  The pain has been waxing and waning for the last 2 days.  It does not go away completely.  It does get worse after eating or drinking.  Nothing makes it better.  No associated symptoms.  Denies similar pain previously.    Additional History Obtained from: None  Limitations to History: None  ------------------------------------------------------------------------------------------------------------------------------------------  Physical Exam:  Appearance: Alert, cooperative,   Skin: Warm, dry, appropriate color for ethnicity.  Eyes: Cornea clear. No scleral icterus or injection.   ENT: Mucous membranes moist.  Pulmonary: No accessory muscle use or stridor. Clear lung sounds bilaterally without rhonchi or wheezing.   Cardiac: Heart sounds regular without murmur. B/L radial pulses full and symmetric.   Abdomen: Soft, not tender.  No rebound or guarding.   Musculoskeletal: No gross deformities.   Neurological: Face symmetrical. Voice clear. Appropriately conversant.   Psychiatric: Appropriate mood and affect.    Medical Decision Making:  Chronic Medical Conditions Significantly Affecting Care:  has a past medical history of Diabetes (Multi), Hyperlipidemia, Hypertension, Personal history of other diseases of the circulatory system, and Personal history of other endocrine, nutritional and metabolic disease.    Social Determinants of Health Significantly Affecting Care: None identified    Differential Diagnosis Considered but not limited to: All ACS is a consideration GI cause is also suspected.  Given that she is borderline hypoxic PE is also a consideration.      External Records Reviewed:   I reviewed recent and relevant outside records including:     Independent Interpretation of Studies: The following studies were ordered as part of the emergency department work up and independently interpreted by me. See ED Course for details.    CBC, CMP,  magnesium, lipase are all within normal limits.  BNP and troponin x 2 are normal.    Chest x-ray by my interpretation is negative for evidence of consolidation or pulmonary edema.  Confirmed to be normal by radiology.    CT PE is negative for evidence of PE.  There is questionable esophagitis but no other acute findings.    The patient was treated with nitroglycerin and morphine.  This did not improve her symptoms.  She was given Zofran after developing nausea after receiving morphine.  She was further treated with GI cocktail.  Despite all of these medications the patient reports ongoing 10 out of 10 pain.    Given the patient's ongoing severe pain I did recommend admission.  The patient is agreeable.  Discussed with Dr. Luciano, hospitalist.  He will admit for further care.    Diagnoses as of 06/23/24 0433   Chest pain, unspecified type          Lian Bryant,   06/30/24 1417

## 2024-06-23 NOTE — CARE PLAN
The patient's goals for the shift include  understanding reason for chest and epigastric discomfort.  Problem: Pain  Goal: Takes deep breaths with improved pain control throughout the shift  Outcome: Progressing  Goal: Turns in bed with improved pain control throughout the shift  Outcome: Progressing  Goal: Walks with improved pain control throughout the shift  Outcome: Progressing  Goal: Performs ADL's with improved pain control throughout shift  Outcome: Progressing  Goal: Participates in PT with improved pain control throughout the shift  Outcome: Progressing  Goal: Free from opioid side effects throughout the shift  Outcome: Progressing  Goal: Free from acute confusion related to pain meds throughout the shift  Outcome: Progressing     Problem: Pain - Adult  Goal: Verbalizes/displays adequate comfort level or baseline comfort level  Outcome: Progressing     Problem: Safety - Adult  Goal: Free from fall injury  Outcome: Progressing     Problem: Discharge Planning  Goal: Discharge to home or other facility with appropriate resources  Outcome: Progressing     Problem: Chronic Conditions and Co-morbidities  Goal: Patient's chronic conditions and co-morbidity symptoms are monitored and maintained or improved  Outcome: Progressing     Problem: Diabetes  Goal: Achieve decreasing blood glucose levels by end of shift  Outcome: Progressing  Goal: Increase stability of blood glucose readings by end of shift  Outcome: Progressing  Goal: Decrease in ketones present in urine by end of shift  Outcome: Progressing  Goal: Maintain electrolyte levels within acceptable range throughout shift  Outcome: Progressing  Goal: Maintain glucose levels >70mg/dl to <250mg/dl throughout shift  Outcome: Progressing  Goal: No changes in neurological exam by end of shift  Outcome: Progressing  Goal: Learn about and adhere to nutrition recommendations by end of shift  Outcome: Progressing  Goal: Vital signs within normal range for age by end of  shift  Outcome: Progressing  Goal: Increase self care and/or family involovement by end of shift  Outcome: Progressing  Goal: Receive DSME education by end of shift  Outcome: Progressing       The clinical goals for the shift include Patient will have a pain level of <4/10 by end of shift.

## 2024-06-23 NOTE — PROGRESS NOTES
Macarena Adler is a 64 y.o. female on day 0 of admission presenting with Chest pain.      Subjective   64 y.o. -American female with a past medical history significant for hypertension, hyperlipidemia, NIDDM 2, tobacco use disorder, anxiety, depression, obesity and chronic lower back pain.  Patient presented to the ED with progressively worsening midsternal/right-sided chest pain that started about 2 days ago.  She states that the symptoms are much worse with any type of activity similar also present at rest secondary to constant sensation that is worsened with eating and drinking.  She describes a burning-like sensation but also noted a pressure-like sensation in the lower midsternal/epigastric area that did not necessarily radiate into her neck/back/upper extremities.  She states that it also feels like a sensation of the food getting stuck or moving through slowly. She denies any inciting events.  She denies any recent sick contacts, chemical/environmental exposures, changes in dietary habits or any recent traumatic events/falls other than noted above.  She denies any fevers, chills, night sweats, vision changes, auditory changes, change in taste/smell, loss of bowel/bladder contro, loss of consciousness, dizziness, vertigo, syncope, seizure-like activity, palpitations, shortness of breath, coughing, wheezing or congestion, hemoptysis, hematemesis, abdominal pain, vomiting, diarrhea, constipation, dysuria, hematuria, dyschezia, hematochezia or any lateralizing motor/sensory deficits other than noted above.     ED Course (Summary):   Vitals on presentation: Temperature 36.2 °C, heart rate of 118, respirations 16, blood pressure 165/83, pulse ox of 95% on room air  Should be noted that the patient's heart rate was initially as high as 124 within the first hour of presentation but since then has decreased to less than 90s from mid 80s to high 90s.  EKG: Sinus rhythm, borderline T wave abnormalities, heart  rate of 101, , QRS of 93, QTc of 484 without any acute ST segment changes and appears similar to prior EKGs  Wbc = 9.7 -->  Hgb/Hct = 13.4/41.2  Glucose = 123  Sodium = 140  Potassium = 3.6  BUN/Creatinine = 12/0.99  Lipid Panel as noted below  BNP = 29  HSTI = 6 --> 7  TSH = 1.66  CXR = noted no evidence of acute cardiopulmonary processes.  CTA chest = noted no acute pulmonary emboli, body habitus limiting sensitivity, thick-walled esophagus in the midportion probably reflecting a component of mild esophagitis, slight hepatic steatosis     :                 Today the patient is awake alert and interactive appropriately seated in her bed.  She apparently continues to have recurrence of her presenting symptoms if she eats.  She describes that within an hour she will have emesis of what she ate.  She does describe the feeling as if something is sticking in her sternal region when she swallows.  She is apparently able to tolerate liquids without difficulty however.  She describes these symptoms began Friday but she was able to eat Friday and Saturday without having emesis.  The emesis apparently just started happening since she arrived to the ED.  Admits she is very frightened and that her mother apparently  of MI.  Cardiology evaluated the patient and felt it was very atypical for cardiac etiology.  Discussed stress test and the patient is of the understanding she will have a stress test but it was not ordered.  Echocardiogram pending.  Certainly does sound more of a GI issue.  Will increase her pantoprazole and consult GI.      Review of Systems   Constitutional:  Positive for activity change and fatigue. Negative for appetite change, chills, diaphoresis, fever and unexpected weight change.   HENT:  Negative for congestion, drooling, ear pain, hearing loss, postnasal drip, rhinorrhea, sinus pressure, sinus pain, sneezing, sore throat, tinnitus, trouble swallowing and voice change.    Eyes:  Negative for  photophobia and visual disturbance.   Respiratory:  Positive for chest tightness and pain. Negative for cough, shortness of breath and wheezing.    Cardiovascular:  Positive for chest pain. Negative for palpitations and leg swelling.   Gastrointestinal:  Positive for postprandial nausea and emesis. Negative for abdominal distention, abdominal pain, blood in stool, constipation, diarrhea.   Genitourinary:  Negative for decreased urine volume, difficulty urinating, dysuria, flank pain, frequency, hematuria and urgency.   Musculoskeletal:  Negative for arthralgias, back pain, gait problem, joint swelling, myalgias, neck pain and neck stiffness.   Skin:  Negative for wound.   Neurological:  Negative for dizziness, tremors, seizures, syncope, facial asymmetry, speech difficulty, weakness, light-headedness, numbness and headaches.   Psychiatric/Behavioral:  Negative for confusion and decreased concentration. The patient is fairly nervous/anxious.    All other systems reviewed and are negative.            Objective     Last Recorded Vitals  /84 (BP Location: Right arm, Patient Position: Sitting)   Pulse 81   Temp 36.3 °C (97.4 °F) (Temporal)   Resp 16   Wt 138 kg (304 lb)   SpO2 94%   Intake/Output last 3 Shifts:  No intake or output data in the 24 hours ending 06/23/24 1444    Admission Weight  Weight: 138 kg (304 lb) (06/23/24 0000)    Daily Weight  06/23/24 : 138 kg (304 lb)    Image Results  CT angio chest for pulmonary embolism  Narrative: Interpreted By:  Doug Greenwood,   STUDY:  CT ANGIO CHEST FOR PULMONARY EMBOLISM;  6/23/2024 2:43 am      INDICATION:  Signs/Symptoms:cp, tachycardia.      COMPARISON:  None      ACCESSION NUMBER(S):  [id number]      ORDERING CLINICIAN:  PAIGE MORROW      TECHNIQUE:  Helical data acquisition of the chest was obtained after intravenous  administration of 75 ML Omnipaque 350, as per PE protocol. Images  were reformatted in coronal and sagittal planes. Axial and  coronal  maximum intensity projection (MIP) images were created and reviewed.      FINDINGS:  POTENTIAL LIMITATIONS OF THE STUDY: Body habitus limits sensitivity      HEART AND VESSELS:  There are no discrete filling defects within main pulmonary artery  and its branches to suggest acute pulmonary embolism. Main pulmonary  artery and its branches are normal in caliber.      The thoracic aorta normal in course and caliber.  No coronary artery calcifications are seen. Please note, the study is  not optimized for evaluation of coronary arteries.      The cardiac chambers are not enlarged.      There is no pericardial effusion seen.      MEDIASTINUM AND UDAY, LOWER NECK AND AXILLA:  The visualized thyroid gland is within normal limits.  No evidence of thoracic lymphadenopathy by CT criteria.  Small sliding hernia with a thick-walled midthoracic esophagus  presumably reflects component of esophagitis. Direct visualization  may be of diagnostic benefit      LUNGS AND AIRWAYS:  No localizing infiltrate. Mild bibasilar atelectasis.      The bilateral lungs are clear without evidence of focal  consolidation, pleural effusion, or pneumothorax.          Hepatic steatosis. Cholecystectomy changes.      CHEST WALL AND OSSEOUS STRUCTURES:  Chest wall is within normal limits.  No acute osseous pathology.There are no suspicious osseous lesions.      Impression: 1. No evidence of acute pulmonary embolism. Body habitus limits  sensitivity. Thick-walled esophagus in the midportion probably  reflects a component of mild esophagitis.      Slight hepatic steatosis      MACRO:  None      Signed by: Doug Greenwood 6/23/2024 3:31 AM  Dictation workstation:   [id number]  XR chest 2 views  Narrative: Interpreted By:  Doug Greenwood,   STUDY:  XR CHEST 2 VIEWS;  6/23/2024 1:39 am      INDICATION:  Signs/Symptoms:cp.      COMPARISON:  None.      ACCESSION NUMBER(S):  [id number]      ORDERING CLINICIAN:  PAIGE MORROW      FINDINGS:                   CARDIOMEDIASTINAL SILHOUETTE:  Cardiomediastinal silhouette is normal in size and configuration.      LUNGS:  Bronchial thickening. No focal infiltrate.      ABDOMEN:  No remarkable upper abdominal findings.      BONES:  No acute osseous changes.      Impression: 1.  No evidence of acute cardiopulmonary process.              MACRO:  None      Signed by: Doug Timo 6/23/2024 2:28 AM  Dictation workstation:   [id number]      Physical Exam  Constitutional:       General: She is not in acute distress.     Appearance: She is obese. She is not ill-appearing or diaphoretic.   HENT:      Head: Normocephalic and atraumatic.      Mouth/Throat:      Mouth: Mucous membranes are moist.      Pharynx: Oropharynx is clear.   Eyes:      Extraocular Movements: Extraocular movements intact.      Conjunctiva/sclera: Conjunctivae normal.      Pupils: Pupils are equal, round, and reactive to light.   Neck:      Vascular: No carotid bruit.   Cardiovascular:      Rate and Rhythm: Normal rate and regular rhythm.      Pulses: Normal pulses.      Heart sounds: No murmur heard.  Pulmonary:      Effort: No respiratory distress.      Breath sounds: Normal breath sounds. No wheezing, rhonchi or rales.   Chest:      Chest wall: No tenderness.   Abdominal:      General: Abdomen is flat. Bowel sounds are normal. There is no distension.      Palpations: Abdomen is soft. There is no mass.      Tenderness: There is no abdominal tenderness. There is no right CVA tenderness, left CVA tenderness, guarding or rebound.   Musculoskeletal:         General: No deformity or signs of injury. Normal range of motion.      Cervical back: Normal range of motion and neck supple. No rigidity or tenderness.      Right lower leg: No edema.      Left lower leg: No edema.   Lymphadenopathy:      Cervical: No cervical adenopathy.   Skin:     General: Skin is warm.      Capillary Refill: Capillary refill takes less than 2 seconds.      Findings: No bruising,  erythema, lesion or rash.   Neurological:      General: No focal deficit present.      Mental Status: She is alert and oriented to person, place, and time. Mental status is at baseline.      Cranial Nerves: No cranial nerve deficit.      Sensory: No sensory deficit.      Motor: No weakness.      Coordination: Coordination normal.      Gait: Gait normal.   Psychiatric:         Mood and Affect: Mild to moderately anxious.         Behavior: Behavior normal.         Thought Content: Thought content normal.         Judgment: Judgment normal.         Relevant Results               Assessment/Plan   This patient currently has cardiac telemetry ordered; if you would like to modify or discontinue the telemetry order, click here to go to the orders activity to modify/discontinue the order.              Principal Problem:    Chest pain    Chest Pain  -concerning for cardiac pain vs esophagitis vs another unclear source at this time  -will need to rule cardiac source   -Cardiology Consult appreciated  -Telemetry Monitoring   -Lipid Panel, TSH w/ Reflex to Free T4 if Abnml =as noted above  -Hgb A1c = pending  -continued on home antihypertensives and adjust accordingly   -ASA/Statin  6/23: Cardiology feels very atypical and likely GI related.  Discussed stress test but did not order.  Patient is of the feeling that she is to have a stress test tomorrow.  Echocardiogram pending.  We will consult GI and increase pantoprazole.     Esophagitis   -possible etiology to above given description  -will start on Protonix and assess response  -if persistent and a negative Cardiac workup then may need to be evaluated by GI in the inpatient to see if EGD is warranted  6/23: Increase pantoprazole to twice daily and consult GI.     Obesity   -BMI = 40.11  -counseled on closely monitored Exercise/Diet regimen under the direction of primary care team and dietitian      NIDDM2  -holding home oral agents  -Hgb A1c = 6.8 (04/04/24) -->  -ISS AC/HS    -Diabetic Diet  -Hypoglycemia protocol    HTN, hyperlipidemia              Darnell Mason MD

## 2024-06-24 ENCOUNTER — APPOINTMENT (OUTPATIENT)
Dept: RADIOLOGY | Facility: HOSPITAL | Age: 64
End: 2024-06-24
Payer: COMMERCIAL

## 2024-06-24 ENCOUNTER — PHARMACY VISIT (OUTPATIENT)
Dept: PHARMACY | Facility: CLINIC | Age: 64
End: 2024-06-24
Payer: MEDICAID

## 2024-06-24 ENCOUNTER — APPOINTMENT (OUTPATIENT)
Dept: CARDIOLOGY | Facility: HOSPITAL | Age: 64
End: 2024-06-24
Payer: COMMERCIAL

## 2024-06-24 VITALS
DIASTOLIC BLOOD PRESSURE: 75 MMHG | HEIGHT: 72 IN | SYSTOLIC BLOOD PRESSURE: 116 MMHG | WEIGHT: 293 LBS | RESPIRATION RATE: 16 BRPM | BODY MASS INDEX: 39.68 KG/M2 | TEMPERATURE: 97.9 F | HEART RATE: 73 BPM | OXYGEN SATURATION: 94 %

## 2024-06-24 PROBLEM — Z86.79 HISTORY OF HYPERTENSION: Status: ACTIVE | Noted: 2024-06-24

## 2024-06-24 PROBLEM — Z86.39 HISTORY OF ELEVATED LIPIDS: Status: ACTIVE | Noted: 2024-06-24

## 2024-06-24 PROBLEM — R73.03 PREDIABETES: Status: ACTIVE | Noted: 2022-04-22

## 2024-06-24 PROBLEM — K21.9 GERD (GASTROESOPHAGEAL REFLUX DISEASE): Status: ACTIVE | Noted: 2024-06-24

## 2024-06-24 PROBLEM — R13.10 DYSPHAGIA: Status: ACTIVE | Noted: 2024-06-24

## 2024-06-24 LAB
ALBUMIN SERPL BCP-MCNC: 3.5 G/DL (ref 3.4–5)
ANION GAP SERPL CALC-SCNC: 13 MMOL/L (ref 10–20)
ATRIAL RATE: 66 BPM
BUN SERPL-MCNC: 8 MG/DL (ref 6–23)
CALCIUM SERPL-MCNC: 8.6 MG/DL (ref 8.6–10.3)
CHLORIDE SERPL-SCNC: 104 MMOL/L (ref 98–107)
CO2 SERPL-SCNC: 25 MMOL/L (ref 21–32)
CREAT SERPL-MCNC: 0.77 MG/DL (ref 0.5–1.05)
EGFRCR SERPLBLD CKD-EPI 2021: 85 ML/MIN/1.73M*2
EJECTION FRACTION APICAL 4 CHAMBER: 75.9
ERYTHROCYTE [DISTWIDTH] IN BLOOD BY AUTOMATED COUNT: 15.4 % (ref 11.5–14.5)
GLUCOSE BLD MANUAL STRIP-MCNC: 111 MG/DL (ref 74–99)
GLUCOSE BLD MANUAL STRIP-MCNC: 117 MG/DL (ref 74–99)
GLUCOSE BLD MANUAL STRIP-MCNC: 130 MG/DL (ref 74–99)
GLUCOSE SERPL-MCNC: 107 MG/DL (ref 74–99)
HCT VFR BLD AUTO: 38.3 % (ref 36–46)
HGB BLD-MCNC: 12.5 G/DL (ref 12–16)
LEFT ATRIUM VOLUME AREA LENGTH INDEX BSA: 10.7 ML/M2
LEFT VENTRICLE INTERNAL DIMENSION DIASTOLE: 4 CM (ref 3.5–6)
LEFT VENTRICULAR OUTFLOW TRACT DIAMETER: 2.1 CM
LV EJECTION FRACTION BIPLANE: 70 %
MAGNESIUM SERPL-MCNC: 1.94 MG/DL (ref 1.6–2.4)
MCH RBC QN AUTO: 28.3 PG (ref 26–34)
MCHC RBC AUTO-ENTMCNC: 32.6 G/DL (ref 32–36)
MCV RBC AUTO: 87 FL (ref 80–100)
MITRAL VALVE E/A RATIO: 0.73
MITRAL VALVE E/E' RATIO: 2.74
NRBC BLD-RTO: 0 /100 WBCS (ref 0–0)
P AXIS: 59 DEGREES
P OFFSET: 171 MS
P ONSET: 113 MS
PHOSPHATE SERPL-MCNC: 3.5 MG/DL (ref 2.5–4.9)
PLATELET # BLD AUTO: 248 X10*3/UL (ref 150–450)
POTASSIUM SERPL-SCNC: 3.7 MMOL/L (ref 3.5–5.3)
PR INTERVAL: 198 MS
Q ONSET: 212 MS
QRS COUNT: 11 BEATS
QRS DURATION: 86 MS
QT INTERVAL: 448 MS
QTC CALCULATION(BAZETT): 469 MS
QTC FREDERICIA: 462 MS
R AXIS: 35 DEGREES
RBC # BLD AUTO: 4.41 X10*6/UL (ref 4–5.2)
RIGHT VENTRICLE FREE WALL PEAK S': 17.7 CM/S
SODIUM SERPL-SCNC: 138 MMOL/L (ref 136–145)
T AXIS: 70 DEGREES
T OFFSET: 436 MS
TRICUSPID ANNULAR PLANE SYSTOLIC EXCURSION: 2.4 CM
VENTRICULAR RATE: 66 BPM
WBC # BLD AUTO: 8.8 X10*3/UL (ref 4.4–11.3)

## 2024-06-24 PROCEDURE — 93306 TTE W/DOPPLER COMPLETE: CPT | Performed by: INTERNAL MEDICINE

## 2024-06-24 PROCEDURE — 96372 THER/PROPH/DIAG INJ SC/IM: CPT | Performed by: INTERNAL MEDICINE

## 2024-06-24 PROCEDURE — 99239 HOSP IP/OBS DSCHRG MGMT >30: CPT | Performed by: PHYSICIAN ASSISTANT

## 2024-06-24 PROCEDURE — 83735 ASSAY OF MAGNESIUM: CPT | Performed by: INTERNAL MEDICINE

## 2024-06-24 PROCEDURE — 2500000001 HC RX 250 WO HCPCS SELF ADMINISTERED DRUGS (ALT 637 FOR MEDICARE OP): Performed by: INTERNAL MEDICINE

## 2024-06-24 PROCEDURE — 93016 CV STRESS TEST SUPVJ ONLY: CPT | Performed by: INTERNAL MEDICINE

## 2024-06-24 PROCEDURE — 93306 TTE W/DOPPLER COMPLETE: CPT

## 2024-06-24 PROCEDURE — 84100 ASSAY OF PHOSPHORUS: CPT | Performed by: INTERNAL MEDICINE

## 2024-06-24 PROCEDURE — 99232 SBSQ HOSP IP/OBS MODERATE 35: CPT | Performed by: PHYSICIAN ASSISTANT

## 2024-06-24 PROCEDURE — 3430000001 HC RX 343 DIAGNOSTIC RADIOPHARMACEUTICALS: Performed by: INTERNAL MEDICINE

## 2024-06-24 PROCEDURE — 82947 ASSAY GLUCOSE BLOOD QUANT: CPT

## 2024-06-24 PROCEDURE — 93018 CV STRESS TEST I&R ONLY: CPT | Performed by: INTERNAL MEDICINE

## 2024-06-24 PROCEDURE — 99221 1ST HOSP IP/OBS SF/LOW 40: CPT | Performed by: INTERNAL MEDICINE

## 2024-06-24 PROCEDURE — 2500000004 HC RX 250 GENERAL PHARMACY W/ HCPCS (ALT 636 FOR OP/ED): Performed by: INTERNAL MEDICINE

## 2024-06-24 PROCEDURE — 2500000004 HC RX 250 GENERAL PHARMACY W/ HCPCS (ALT 636 FOR OP/ED): Performed by: FAMILY MEDICINE

## 2024-06-24 PROCEDURE — G0378 HOSPITAL OBSERVATION PER HR: HCPCS

## 2024-06-24 PROCEDURE — 93005 ELECTROCARDIOGRAM TRACING: CPT

## 2024-06-24 PROCEDURE — 78452 HT MUSCLE IMAGE SPECT MULT: CPT | Performed by: INTERNAL MEDICINE

## 2024-06-24 PROCEDURE — 93017 CV STRESS TEST TRACING ONLY: CPT

## 2024-06-24 PROCEDURE — 99233 SBSQ HOSP IP/OBS HIGH 50: CPT | Performed by: PHYSICIAN ASSISTANT

## 2024-06-24 PROCEDURE — 85027 COMPLETE CBC AUTOMATED: CPT | Performed by: INTERNAL MEDICINE

## 2024-06-24 PROCEDURE — A9502 TC99M TETROFOSMIN: HCPCS | Performed by: INTERNAL MEDICINE

## 2024-06-24 PROCEDURE — RXMED WILLOW AMBULATORY MEDICATION CHARGE

## 2024-06-24 PROCEDURE — 78452 HT MUSCLE IMAGE SPECT MULT: CPT

## 2024-06-24 PROCEDURE — 2500000001 HC RX 250 WO HCPCS SELF ADMINISTERED DRUGS (ALT 637 FOR MEDICARE OP): Performed by: FAMILY MEDICINE

## 2024-06-24 PROCEDURE — 36415 COLL VENOUS BLD VENIPUNCTURE: CPT | Performed by: INTERNAL MEDICINE

## 2024-06-24 PROCEDURE — 93010 ELECTROCARDIOGRAM REPORT: CPT | Performed by: INTERNAL MEDICINE

## 2024-06-24 RX ORDER — PANTOPRAZOLE SODIUM 40 MG/1
40 TABLET, DELAYED RELEASE ORAL
Qty: 60 TABLET | Refills: 0 | Status: SHIPPED | OUTPATIENT
Start: 2024-06-24 | End: 2024-07-24

## 2024-06-24 RX ADMIN — REGADENOSON 0.4 MG: 0.08 INJECTION, SOLUTION INTRAVENOUS at 10:19

## 2024-06-24 RX ADMIN — HEPARIN SODIUM 7500 UNITS: 5000 INJECTION INTRAVENOUS; SUBCUTANEOUS at 06:12

## 2024-06-24 RX ADMIN — AMLODIPINE BESYLATE 10 MG: 10 TABLET ORAL at 08:09

## 2024-06-24 RX ADMIN — TETROFOSMIN 10 MILLICURIE: 0.23 INJECTION, POWDER, LYOPHILIZED, FOR SOLUTION INTRAVENOUS at 08:15

## 2024-06-24 RX ADMIN — HEPARIN SODIUM 7500 UNITS: 5000 INJECTION INTRAVENOUS; SUBCUTANEOUS at 13:57

## 2024-06-24 RX ADMIN — TETROFOSMIN 30 MILLICURIE: 0.23 INJECTION, POWDER, LYOPHILIZED, FOR SOLUTION INTRAVENOUS at 10:20

## 2024-06-24 RX ADMIN — PANTOPRAZOLE SODIUM 40 MG: 40 TABLET, DELAYED RELEASE ORAL at 15:37

## 2024-06-24 RX ADMIN — ASPIRIN 81 MG: 81 TABLET, COATED ORAL at 08:09

## 2024-06-24 RX ADMIN — HUMAN ALBUMIN MICROSPHERES AND PERFLUTREN 0.5 ML: 10; .22 INJECTION, SOLUTION INTRAVENOUS at 11:05

## 2024-06-24 RX ADMIN — PERFLUTREN 4 ML OF DILUTION: 6.52 INJECTION, SUSPENSION INTRAVENOUS at 11:25

## 2024-06-24 SDOH — SOCIAL STABILITY: SOCIAL INSECURITY: WERE YOU ABLE TO COMPLETE ALL THE BEHAVIORAL HEALTH SCREENINGS?: YES

## 2024-06-24 SDOH — SOCIAL STABILITY: SOCIAL INSECURITY: HAVE YOU HAD ANY THOUGHTS OF HARMING ANYONE ELSE?: NO

## 2024-06-24 SDOH — SOCIAL STABILITY: SOCIAL INSECURITY: ARE YOU OR HAVE YOU BEEN THREATENED OR ABUSED PHYSICALLY, EMOTIONALLY, OR SEXUALLY BY ANYONE?: NO

## 2024-06-24 SDOH — SOCIAL STABILITY: SOCIAL INSECURITY: HAVE YOU HAD THOUGHTS OF HARMING ANYONE ELSE?: NO

## 2024-06-24 SDOH — SOCIAL STABILITY: SOCIAL INSECURITY: HAS ANYONE EVER THREATENED TO HURT YOUR FAMILY OR YOUR PETS?: NO

## 2024-06-24 SDOH — SOCIAL STABILITY: SOCIAL INSECURITY: DOES ANYONE TRY TO KEEP YOU FROM HAVING/CONTACTING OTHER FRIENDS OR DOING THINGS OUTSIDE YOUR HOME?: NO

## 2024-06-24 SDOH — SOCIAL STABILITY: SOCIAL INSECURITY: DO YOU FEEL UNSAFE GOING BACK TO THE PLACE WHERE YOU ARE LIVING?: NO

## 2024-06-24 SDOH — SOCIAL STABILITY: SOCIAL INSECURITY: ABUSE: ADULT

## 2024-06-24 SDOH — SOCIAL STABILITY: SOCIAL INSECURITY: DO YOU FEEL ANYONE HAS EXPLOITED OR TAKEN ADVANTAGE OF YOU FINANCIALLY OR OF YOUR PERSONAL PROPERTY?: NO

## 2024-06-24 SDOH — SOCIAL STABILITY: SOCIAL INSECURITY: ARE THERE ANY APPARENT SIGNS OF INJURIES/BEHAVIORS THAT COULD BE RELATED TO ABUSE/NEGLECT?: NO

## 2024-06-24 ASSESSMENT — LIFESTYLE VARIABLES
HOW OFTEN DO YOU HAVE A DRINK CONTAINING ALCOHOL: NEVER
AUDIT-C TOTAL SCORE: 0
AUDIT-C TOTAL SCORE: 0
SKIP TO QUESTIONS 9-10: 1
HOW MANY STANDARD DRINKS CONTAINING ALCOHOL DO YOU HAVE ON A TYPICAL DAY: PATIENT DOES NOT DRINK
HOW OFTEN DO YOU HAVE 6 OR MORE DRINKS ON ONE OCCASION: NEVER

## 2024-06-24 ASSESSMENT — ACTIVITIES OF DAILY LIVING (ADL)
LACK_OF_TRANSPORTATION: NO
JUDGMENT_ADEQUATE_SAFELY_COMPLETE_DAILY_ACTIVITIES: YES
DRESSING YOURSELF: INDEPENDENT
BATHING: INDEPENDENT
LACK_OF_TRANSPORTATION: NO
HEARING - RIGHT EAR: FUNCTIONAL
WALKS IN HOME: INDEPENDENT
PATIENT'S MEMORY ADEQUATE TO SAFELY COMPLETE DAILY ACTIVITIES?: YES
GROOMING: INDEPENDENT
HEARING - LEFT EAR: FUNCTIONAL
FEEDING YOURSELF: INDEPENDENT
TOILETING: INDEPENDENT
ADEQUATE_TO_COMPLETE_ADL: YES

## 2024-06-24 ASSESSMENT — ENCOUNTER SYMPTOMS
BACK PAIN: 0
CONSTIPATION: 0
EYE PAIN: 0
ORTHOPNEA: 0
FEVER: 0
TROUBLE SWALLOWING: 0
ABDOMINAL PAIN: 0
DYSURIA: 0
NAUSEA: 0
WEAKNESS: 0
CHILLS: 0
BRUISES/BLEEDS EASILY: 0
JOINT SWELLING: 0
BLOOD IN STOOL: 0
HALLUCINATIONS: 0
COUGH: 0
FREQUENCY: 0
VOMITING: 0
FATIGUE: 0
NUMBNESS: 0
DIARRHEA: 0
LIGHT-HEADEDNESS: 0
APPETITE CHANGE: 0
WHEEZING: 0
SHORTNESS OF BREATH: 0
DIZZINESS: 0
FLANK PAIN: 0
SORE THROAT: 0
WOUND: 0
CHEST TIGHTNESS: 1
DIAPHORESIS: 0
PALPITATIONS: 0
HEADACHES: 0
FACIAL SWELLING: 0
HEMATURIA: 0

## 2024-06-24 ASSESSMENT — COGNITIVE AND FUNCTIONAL STATUS - GENERAL
DAILY ACTIVITIY SCORE: 24
MOBILITY SCORE: 24
DAILY ACTIVITIY SCORE: 24
PATIENT BASELINE BEDBOUND: NO
MOBILITY SCORE: 24

## 2024-06-24 ASSESSMENT — PAIN SCALES - GENERAL
PAINLEVEL_OUTOF10: 0 - NO PAIN

## 2024-06-24 ASSESSMENT — PAIN - FUNCTIONAL ASSESSMENT
PAIN_FUNCTIONAL_ASSESSMENT: 0-10

## 2024-06-24 NOTE — PROGRESS NOTES
06/24/24 1238   Discharge Planning   Living Arrangements Children   Support Systems Children   Assistance Needed Independent   Type of Residence Private residence   Home or Post Acute Services None   Patient expects to be discharged to: Home   Does the patient need discharge transport arranged? No   Financial Resource Strain   How hard is it for you to pay for the very basics like food, housing, medical care, and heating? Not hard   Housing Stability   In the last 12 months, was there a time when you were not able to pay the mortgage or rent on time? N   In the last 12 months, was there a time when you did not have a steady place to sleep or slept in a shelter (including now)? N   Transportation Needs   In the past 12 months, has lack of transportation kept you from medical appointments or from getting medications? no   In the past 12 months, has lack of transportation kept you from meetings, work, or from getting things needed for daily living? No     PCP is Adilson Kelly MD. Patient is from home with her son. Patient is independent with ambulation, self care, shopping, and meals.  Patients friends provide her with transportation when needed. Patient is requesting a raised toilet seat, explained to patient that medicaid does not provide them. Patient plans to return home with no needs upon discharge. [facility] will continue to follow for needs if they arise.

## 2024-06-24 NOTE — CARE PLAN
The patient's goals for the shift include  being free from injury or falls throughout shift.    The clinical goals for the shift include Patient will have a pain level of <4/10 by end of shift.    Over the shift, the patient did  make progress toward the following goals.       Problem: Pain  Goal: Turns in bed with improved pain control throughout the shift  Outcome: Progressing  Goal: Walks with improved pain control throughout the shift  Outcome: Progressing     Problem: Safety - Adult  Goal: Free from fall injury  Outcome: Progressing

## 2024-06-24 NOTE — CONSULTS
"Inpatient consult to Gastroenterology  Consult performed by: aSnjay iPerre MD  Consult ordered by: Darnell Mason MD        Reason For Consult  \"Postprandial emesis in the setting of chest pain felt unlikely cardiac related\"        [facility] Gastroenterology Consultation Note    ASSESSMENT and PLAN:       Macarena Adler is a 64 y.o. female with a significant past medical history of diabetes, HTN, chronic back pain, tobacco use, anxiety/depression, obesity (Body mass index is 40.23 kg/m².), and HLD who presented with chest pain. GI was consulted for \"Postprandial emesis in the setting of chest pain felt unlikely cardiac related\".       GERD  Some symptoms consistent with GERD although other aspects of her symptoms as well as the acute onset would be atypical. She has some dysphagia and will need EGD for evaluation. This will be scheduled as an outpatient and she prefers to have this done with Dr. Pickett who did her last colonoscopy.  - agree with PPI  - outpatient EGD (my office will arrange)          Sanjay Pierre MD        Gastroenterology    [facility]    Clinical   [facility]        HISTORY OF PRESENT ILLNESS:     History Of Present Illness:    Macarena Adler is a 64 y.o. female with a significant past medical history of diabetes, HTN, chronic back pain, tobacco use, anxiety/depression, obesity (Body mass index is 40.23 kg/m².), and HLD who presented with chest pain. GI was consulted for \"Postprandial emesis in the setting of chest pain felt unlikely cardiac related\".      Today she reports that she has been dealing with chest pain for the last 4 days. She describes a sharp/pressure sensation in the middle of her chest. She says that this started suddenly with no previous similar episodes. She says that pain is worse with laying down as well as with movement and eating. She denies heartburn, but does report a " "globus sensation as well as dysphagia where solid food \"seems to go down slow\". No dysphagia with liquids. She reports vomiting one to two times since admission. No previous heartburn and she was not previously on any PPI. She has never had an EGD.      Endoscopy History:  11/29/2023 - Colonoscopy: two transverse polyps (TA on path)      Review of systems:     Patient denies any odynophagia, abdominal pain, diarrhea, constipation, hematemesis, hematochezia, melena, or weight loss.    I performed a complete 10 point review of systems and it is negative except as noted in HPI or above. All other systems have been reviewed and are negative.        PAST HISTORIES:       Past Medical History:  She has a past medical history of Diabetes (Multi), Hyperlipidemia, Hypertension, Personal history of other diseases of the circulatory system, and Personal history of other endocrine, nutritional and metabolic disease.    Past Surgical History:  She has a past surgical history that includes Hysterectomy (08/17/2017).      Social History:  She reports that she has been smoking cigarettes. She started smoking about 38 years ago. She has a 39.2 pack-year smoking history. She has never used smokeless tobacco. She reports that she does not drink alcohol and does not use drugs.    Family History:    Family History   Problem Relation Name Age of Onset    Colon cancer Mother      No Known Problems Father      Colon cancer Other          Allergies:  Patient has no known allergies.      OBJECTIVE:       Last Recorded Vitals:  Vitals:    06/23/24 2303 06/24/24 0300 06/24/24 0600 06/24/24 0728   BP: 130/77 135/79  131/86   BP Location: Left arm Left arm  Left arm   Patient Position: Sitting Sitting  Sitting   Pulse: 70 78  76   Resp: 16 16  17   Temp: 36.4 °C (97.6 °F) 36.5 °C (97.7 °F)  36.8 °C (98.2 °F)   TempSrc: Temporal Temporal  Temporal   SpO2: 95% 94%  94%   Weight:  139 kg (306 lb 14.1 oz) 138 kg (304 lb 14.3 oz)    Height:     " "    /86 (BP Location: Left arm, Patient Position: Sitting)   Pulse 76   Temp 36.8 °C (98.2 °F) (Temporal)   Resp 17   Ht 1.854 m (6' 1\")   Wt 138 kg (304 lb 14.3 oz)   SpO2 94%   BMI 40.23 kg/m²      Physical Exam:    Physical Exam  Vitals reviewed.   Constitutional:       General: She is not in acute distress.     Appearance: She is obese. She is not ill-appearing.   HENT:      Head: Normocephalic and atraumatic.   Eyes:      General: No scleral icterus.  Cardiovascular:      Rate and Rhythm: Normal rate and regular rhythm.      Pulses: Normal pulses.      Heart sounds: Normal heart sounds. No murmur heard.  Pulmonary:      Effort: Pulmonary effort is normal. No respiratory distress.      Breath sounds: Normal breath sounds. No wheezing.   Abdominal:      General: Bowel sounds are normal.      Palpations: Abdomen is soft.      Tenderness: There is no abdominal tenderness. There is no rebound.   Musculoskeletal:         General: No swelling or deformity.   Skin:     General: Skin is warm and dry.      Coloration: Skin is not jaundiced.      Findings: No rash.   Neurological:      General: No focal deficit present.      Mental Status: She is alert and oriented to person, place, and time.   Psychiatric:         Mood and Affect: Mood normal.         Behavior: Behavior normal.         Thought Content: Thought content normal.         Judgment: Judgment normal.           Inpatient Medications:  amLODIPine, 10 mg, oral, Daily  aspirin, 81 mg, oral, Daily  atorvastatin, 40 mg, oral, Nightly  heparin (porcine), 7,500 Units, subcutaneous, q8h PARTH  insulin lispro, 0-5 Units, subcutaneous, TID  pantoprazole, 40 mg, oral, BID AC  PARoxetine, 10 mg, oral, Nightly  perflutren lipid microspheres, 0.5-10 mL of dilution, intravenous, Once in imaging  perflutren protein A microsphere, 0.5 mL, intravenous, Once in imaging  regadenoson, 0.4 mg, intravenous, Once in imaging  sulfur hexafluoride microsphr, 2 mL, intravenous, " "Once in imaging      PRN medications: acetaminophen, aminophylline, dextrose, dextrose, glucagon, glucagon, ondansetron, promethazine    Outpatient Medications:  Prior to Admission medications    Medication Sig Start Date End Date Taking? Authorizing Provider   alcohol swabs (BD Alcohol Swabs) pads, medicated TESTING ONCE DAILY 1/22/24   Adilson Kelly MD   amLODIPine (Norvasc) 10 mg tablet Take 1 tablet (10 mg) by mouth once daily. 12/12/23   Adilson Kelly MD   cholecalciferol (Vitamin D-3) 25 MCG (1000 UT) capsule Take 1 capsule (25 mcg) by mouth once daily. 12/12/23   Adilson Kelly MD   empagliflozin (Jardiance) 25 mg TAKE 1 TABLET BY MOUTH ONCE DAILY 6/4/24   Adilson Kelly MD   lidocaine (Lidoderm) 5 % patch Place 1 patch over 12 hours on the skin once daily. Remove & discard patch within 12 hours or as directed by MD. 4/13/24   Catrachito Villalba MD   PARoxetine (Paxil) 10 mg tablet TAKE 1 TABLET BY MOUTH AT BEDTIME 6/4/24   Adilson Kelly MD   pravastatin (Pravachol) 40 mg tablet TAKE 1 TABLET (40mg) BY MOUTH ONCE DAILY 6/4/24   Adilson Kelly MD   True Metrix Glucose Test Strip strip USE 1 STRIP TWICE DAILY. 1/22/24   Adilson Kelly MD   Unilet Lancet 33 gauge misc USE AS DIRECTED ONCE DAILY 1/22/24   Adilson Kelly MD       LABS AND IMAGING:     Labs:  Recent labs reviewed in the EMR.    Lab Results   Component Value Date    WBC 8.8 06/24/2024    HGB 12.5 06/24/2024    HGB 13.4 06/23/2024    MCV 87 06/24/2024     06/24/2024     06/23/2024       Lab Results   Component Value Date     06/24/2024    K 3.7 06/24/2024     06/24/2024    BUN 8 06/24/2024    CREATININE 0.77 06/24/2024    CREATININE 0.99 06/23/2024       Lab Results   Component Value Date    BILITOT 0.3 06/23/2024    ALKPHOS 121 06/23/2024    AST 12 06/23/2024    ALT 14 06/23/2024    LIPASE 10 06/23/2024       No results found for: \"CRP\", \"CALPS\"      Imaging:  CT angio chest for pulmonary embolism    Result " Date: 6/23/2024  Interpreted By:  Doug Greenwood, STUDY: CT ANGIO CHEST FOR PULMONARY EMBOLISM;  6/23/2024 2:43 am   INDICATION: Signs/Symptoms:cp, tachycardia.   COMPARISON: None   ACCESSION NUMBER(S): [id number]   ORDERING CLINICIAN: PAIGE MORROW   TECHNIQUE: Helical data acquisition of the chest was obtained after intravenous administration of 75 ML Omnipaque 350, as per PE protocol. Images were reformatted in coronal and sagittal planes. Axial and coronal maximum intensity projection (MIP) images were created and reviewed.   FINDINGS: POTENTIAL LIMITATIONS OF THE STUDY: Body habitus limits sensitivity   HEART AND VESSELS: There are no discrete filling defects within main pulmonary artery and its branches to suggest acute pulmonary embolism. Main pulmonary artery and its branches are normal in caliber.   The thoracic aorta normal in course and caliber. No coronary artery calcifications are seen. Please note, the study is not optimized for evaluation of coronary arteries.   The cardiac chambers are not enlarged.   There is no pericardial effusion seen.   MEDIASTINUM AND UDAY, LOWER NECK AND AXILLA: The visualized thyroid gland is within normal limits. No evidence of thoracic lymphadenopathy by CT criteria. Small sliding hernia with a thick-walled midthoracic esophagus presumably reflects component of esophagitis. Direct visualization may be of diagnostic benefit   LUNGS AND AIRWAYS: No localizing infiltrate. Mild bibasilar atelectasis.   The bilateral lungs are clear without evidence of focal consolidation, pleural effusion, or pneumothorax.     Hepatic steatosis. Cholecystectomy changes.   CHEST WALL AND OSSEOUS STRUCTURES: Chest wall is within normal limits. No acute osseous pathology.There are no suspicious osseous lesions.       1. No evidence of acute pulmonary embolism. Body habitus limits sensitivity. Thick-walled esophagus in the midportion probably reflects a component of mild esophagitis.   Slight hepatic  steatosis   MACRO: None   Signed by: Doug Greenwood 6/23/2024 3:31 AM Dictation workstation:   [id number]      XR chest 2 views    Result Date: 6/23/2024  Interpreted By:  Doug Greenwood, STUDY: XR CHEST 2 VIEWS;  6/23/2024 1:39 am   INDICATION: Signs/Symptoms:cp.   COMPARISON: None.   ACCESSION NUMBER(S): [id number]   ORDERING CLINICIAN: PAIGE MORROW   FINDINGS:         CARDIOMEDIASTINAL SILHOUETTE: Cardiomediastinal silhouette is normal in size and configuration.   LUNGS: Bronchial thickening. No focal infiltrate.   ABDOMEN: No remarkable upper abdominal findings.   BONES: No acute osseous changes.       1.  No evidence of acute cardiopulmonary process.       MACRO: None   Signed by: Doug Greenwood 6/23/2024 2:28 AM Dictation workstation:   [id number]

## 2024-06-24 NOTE — NURSING NOTE
Discharge summary reviewed with patient. Patient education included but not limited to medications, prescriptions, follow up appointments and home instructions.  All questions answered, belongings returned and transportation will be here after 8pm. Will notify oncoming night shift nurse the estimated  time. They will also need to remove heart monitor and IV's.

## 2024-06-24 NOTE — PROGRESS NOTES
Social work consult placed for discharge planning/ coping with illness. SW reviewed pt's chart and communicated with TCC. No SW needs foreseen at this time. SW signing off; available upon request.    ALMA Buchanan ([id number])   Care Transitions

## 2024-06-24 NOTE — PROGRESS NOTES
PROGRESS NOTE    HPI:  Macarena Adler is a 64 y.o. female presenting with central chest pain for 2 days.  She states the pain gets worse whenever she lies back and improves when she sits up.  It also worse with any kind of movement and eating and drinking.  She has been sick in the stomach and has thrown up twice since being here.     She has no prior history of heart disease but other comorbidities include hypertension, hyperlipidemia, NIDDM 2, tobacco use disorder, anxiety, depression, obesity.     Family history pertinent for CHF and siblings.  ECG personally reviewed shows sinus rhythm no ST-T changes cardiac enzymes are negative.     She states she is symptom-free as long as she is sitting up.  She describes the pain as a heartburn type symptoms.    Subjective Data:  Patient up walking in the room.  No shortness of breath but still has this vague discomfort left lower sternal border and she describes it as just feels like something stuck there and it is constant.  Cardiac enzymes have been negative.  GI will see the patient as an outpatient for an EGD.  She is going for stress testing today.  Would suggest that if the stress test shows no no large areas of ischemia she can be considered for discharge.    Overnight Events:    None     Objective Data:  Last Recorded Vitals:  Vitals:    06/23/24 2303 06/24/24 0300 06/24/24 0600 06/24/24 0728   BP: 130/77 135/79  131/86   BP Location: Left arm Left arm  Left arm   Patient Position: Sitting Sitting  Sitting   Pulse: 70 78  76   Resp: 16 16  17   Temp: 36.4 °C (97.6 °F) 36.5 °C (97.7 °F)  36.8 °C (98.2 °F)   TempSrc: Temporal Temporal  Temporal   SpO2: 95% 94%  94%   Weight:  139 kg (306 lb 14.1 oz) 138 kg (304 lb 14.3 oz)    Height:           Last Labs:  CBC - 6/24/2024:  4:15 AM  8.8 12.5 248    38.3      CMP - 6/24/2024:  4:15 AM  8.6 7.1 12 --- 0.3   3.5 3.5 14 121      PTT - No results in last year.  _   _ _     Last I/O:  No intake/output data recorded.    Past  Cardiology Tests (Last 3 Years):  Echo: Pending today    Stress Test: Pending today        Inpatient Medications:  Scheduled medications   Medication Dose Route Frequency    amLODIPine  10 mg oral Daily    aspirin  81 mg oral Daily    atorvastatin  40 mg oral Nightly    heparin (porcine)  7,500 Units subcutaneous q8h PARTH    insulin lispro  0-5 Units subcutaneous TID    pantoprazole  40 mg oral BID AC    PARoxetine  10 mg oral Nightly    perflutren lipid microspheres  0.5-10 mL of dilution intravenous Once in imaging    perflutren protein A microsphere  0.5 mL intravenous Once in imaging    regadenoson  0.4 mg intravenous Once in imaging    sulfur hexafluoride microsphr  2 mL intravenous Once in imaging       Review of Systems   Constitutional: Negative for fever and malaise/fatigue.   Cardiovascular:  Positive for chest pain. Negative for orthopnea and palpitations.   Respiratory:  Negative for shortness of breath and wheezing.    Skin:  Negative for itching and rash.   Gastrointestinal:  Negative for abdominal pain, diarrhea, nausea and vomiting.   Genitourinary:  Negative for dysuria.   Neurological:  Negative for weakness.        Physical Exam  Constitutional:       General: She is not in acute distress.     Appearance: Normal appearance.   HENT:      Mouth/Throat:      Mouth: Mucous membranes are moist.   Neck:      Comments: Flat neck veins    Cardiovascular:      Rate and Rhythm: Normal rate and regular rhythm.      Heart sounds: Normal heart sounds.      Comments: Pain not reproducible LSB  Pulmonary:      Effort: Pulmonary effort is normal.      Breath sounds: Normal breath sounds.   Abdominal:      Palpations: Abdomen is soft.      Tenderness: There is no abdominal tenderness.   Musculoskeletal:      Right lower leg: No edema.      Left lower leg: No edema.   Skin:     General: Skin is warm and dry.   Neurological:      Mental Status: She is alert and oriented to person, place, and time.   Psychiatric:          Behavior: Behavior is cooperative.        ASSESSMENT/PLAN  1-chest pain-very atypical chest pain consistent with gastroenteritis or esophagitis or upper GI etiology.  Very unlikely to be cardiac.  Patient has multiple cardiovascular risk factors and recommend ongoing risk factor modification and stress test when she is stable and follow-up through PCP for risk factor modification.  6-24-24: Ongoing constant discomfort.  Cardiac enzymes negative.  For stress testing today.  Patient also for echocardiogram today.  If no significant areas of ischemia or significant LV dysfunction she can be considered for discharge.  GI will be seeing  her as an outpatient and arrange for EGD.     2-tobacco use-smoking cessation counseling was done she is motivated to quit.    Recommendations--stress test and echocardiogram today.  If testing is unremarkable could consider for discharge.  At this time we do not feel that her discomfort is cardiac in nature.      Darnell Chacon PA-C  6/24/2024  8:44 AM

## 2024-06-24 NOTE — PROGRESS NOTES
Macarena Adler is a 64 y.o. female on day 0 of admission presenting with Chest pain.      Subjective   Macarena Adler is a 64 y.o. -American female with a past medical history significant for hypertension, hyperlipidemia, NIDDM 2, tobacco use disorder, anxiety, depression, obesity and chronic lower back pain.  Patient presented to the ED with progressively worsening midsternal/right-sided chest pain that started about 2 days ago.  She states that the symptoms are much worse with any type of activity similar also present at rest secondary to constant sensation that is worsened with eating and drinking.  She describes a burning-like sensation but also noted a pressure-like sensation in the lower midsternal/epigastric area that did not necessarily radiate into her neck/back/upper extremities.  She states that it also feels like a sensation of the food getting stuck or moving through slowly. She denies any inciting events. Should be noted that the patient's heart rate was initially as high as 124 within the first hour of presentation but since then has decreased to less than 90s from mid 80s to high 90s. EKG: Sinus rhythm, borderline T wave abnormalities, heart rate of 101, without any acute ST segment changes and appears similar to prior EKGs. BNP 29. HSTI 6-7. TSH WNL. CXR negative. CTA chest noted no acute pulmonary emboli, body habitus limiting sensitivity, thick-walled esophagus in the midportion probably reflecting a component of mild esophagitis, slight hepatic steatosis    6/24/2024: No acute events overnight. Vitals stable. Labs largely unremarkable.  Stress and echo today. Seen by GI, agrees with PPI therapy, plans for outpatient EGD with her primary Gastroenterologist Dr. Pickett. Hopeful to go home this evening is stress and echo are reasonable.          Review of Systems   Constitutional:  Negative for appetite change, chills, diaphoresis, fatigue and fever.   HENT:  Negative for congestion, ear  pain, facial swelling, hearing loss, nosebleeds, sore throat, tinnitus and trouble swallowing.    Eyes:  Negative for pain.   Respiratory:  Positive for chest tightness. Negative for cough, shortness of breath and wheezing.    Cardiovascular:  Negative for chest pain, palpitations and leg swelling.   Gastrointestinal:  Negative for abdominal pain, blood in stool, constipation, diarrhea, nausea and vomiting.   Genitourinary:  Negative for dysuria, flank pain, frequency, hematuria and urgency.   Musculoskeletal:  Negative for back pain and joint swelling.   Skin:  Negative for rash and wound.   Neurological:  Negative for dizziness, syncope, weakness, light-headedness, numbness and headaches.   Hematological:  Does not bruise/bleed easily.   Psychiatric/Behavioral:  Negative for behavioral problems, hallucinations and suicidal ideas.           Objective     Last Recorded Vitals  /86 (BP Location: Left arm, Patient Position: Sitting)   Pulse 76   Temp 36.8 °C (98.2 °F) (Temporal)   Resp 17   Wt 138 kg (304 lb 14.3 oz)   SpO2 94%     Image Results  Electrocardiogram, 12-lead PRN ACS symptoms    Result Date: 6/24/2024  Sinus rhythm with Premature atrial complexes Inferior infarct , age undetermined Abnormal ECG When compared with ECG of 23-JUN-2024 00:04, PREVIOUS ECG IS PRESENT    CT angio chest for pulmonary embolism    Result Date: 6/23/2024  Interpreted By:  Doug Greenwood, STUDY: CT ANGIO CHEST FOR PULMONARY EMBOLISM;  6/23/2024 2:43 am   INDICATION: Signs/Symptoms:cp, tachycardia.   COMPARISON: None   ACCESSION NUMBER(S): [id number]   ORDERING CLINICIAN: PAIGE MORROW   TECHNIQUE: Helical data acquisition of the chest was obtained after intravenous administration of 75 ML Omnipaque 350, as per PE protocol. Images were reformatted in coronal and sagittal planes. Axial and coronal maximum intensity projection (MIP) images were created and reviewed.   FINDINGS: POTENTIAL LIMITATIONS OF THE STUDY: Body habitus  limits sensitivity   HEART AND VESSELS: There are no discrete filling defects within main pulmonary artery and its branches to suggest acute pulmonary embolism. Main pulmonary artery and its branches are normal in caliber.   The thoracic aorta normal in course and caliber. No coronary artery calcifications are seen. Please note, the study is not optimized for evaluation of coronary arteries.   The cardiac chambers are not enlarged.   There is no pericardial effusion seen.   MEDIASTINUM AND UDAY, LOWER NECK AND AXILLA: The visualized thyroid gland is within normal limits. No evidence of thoracic lymphadenopathy by CT criteria. Small sliding hernia with a thick-walled midthoracic esophagus presumably reflects component of esophagitis. Direct visualization may be of diagnostic benefit   LUNGS AND AIRWAYS: No localizing infiltrate. Mild bibasilar atelectasis.   The bilateral lungs are clear without evidence of focal consolidation, pleural effusion, or pneumothorax.     Hepatic steatosis. Cholecystectomy changes.   CHEST WALL AND OSSEOUS STRUCTURES: Chest wall is within normal limits. No acute osseous pathology.There are no suspicious osseous lesions.       1. No evidence of acute pulmonary embolism. Body habitus limits sensitivity. Thick-walled esophagus in the midportion probably reflects a component of mild esophagitis.   Slight hepatic steatosis   MACRO: None   Signed by: Doug Greenwood 6/23/2024 3:31 AM Dictation workstation:   [id number]    XR chest 2 views    Result Date: 6/23/2024  Interpreted By:  Doug Greenwood, STUDY: XR CHEST 2 VIEWS;  6/23/2024 1:39 am   INDICATION: Signs/Symptoms:cp.   COMPARISON: None.   ACCESSION NUMBER(S): [id number]   ORDERING CLINICIAN: PAIGE MORROW   FINDINGS:         CARDIOMEDIASTINAL SILHOUETTE: Cardiomediastinal silhouette is normal in size and configuration.   LUNGS: Bronchial thickening. No focal infiltrate.   ABDOMEN: No remarkable upper abdominal findings.   BONES: No acute  osseous changes.       1.  No evidence of acute cardiopulmonary process.       MACRO: None   Signed by: Doug Greenwood 6/23/2024 2:28 AM Dictation workstation:   [id number]    XR DEXA bone density    Result Date: 6/6/2024  Interpreted By:  Abhi Patel, STUDY: DEXA BONE DENSITY6/6/2024 11:24 am   INDICATION: Signs/Symptoms:osteoprosis screening. The patient is a 65 y/o  year old F.   COMPARISON: 05/10/2012   ACCESSION NUMBER(S): [id number]   ORDERING CLINICIAN: NEGRO FRITZ   TECHNIQUE: DEXA BONE DENSITY   FINDINGS: SPINE L1-L4 Bone Mineral Density: 1.214 T-Score 1.5  Z-Score 3.4 Bone Mineral Density change vs baseline (%):  -10 Bone Mineral Density change vs previous (%): -10   LEFT FEMUR -TOTAL Bone Mineral Density: 1.022 T-Score 0.7   Z-Score  1.9 Bone Mineral Density change vs baseline (%): -3.7 Bone Mineral Density change vs previous (%): -3.7   LEFT FEMUR -NECK Bone Mineral Density: 0.737 T-Score -1  Z-Score 0.6     World Health Organization (WHO) criteria for post-menopausal,  Women: Normal:         T-score at or above -1 SD Osteopenia:   T-score between -1 and -2.5 SD Osteoporosis: T-score at or below -2.5 SD     10-year Fracture Risk (%): Major Osteoporotic Fracture  7.5 Hip Fracture                        0.9   Note:  If no FRAX score is reported, it is because: Some T-score for Spine Total or Hip Total or Femoral Neck at or below -2.5   This exam was performed at [facility] on a [employer] [facility] Dexa Unit.       DEXA:  According to World Health Organization criteria, classification is normal.   Followup recommended in 2 years or sooner as clinically warranted.   All images and detailed analysis are available on the  Radiology PACS.   MACRO: None   Signed by: Abhi Patel 6/6/2024 11:36 AM Dictation workstation:   [id number]       Lab Results  Results for orders placed or performed during the hospital encounter of 06/23/24 (from the past 24 hour(s))   POCT GLUCOSE    Result Value Ref Range    POCT Glucose 129 (H) 74 - 99 mg/dL   POCT GLUCOSE   Result Value Ref Range    POCT Glucose 99 74 - 99 mg/dL   POCT GLUCOSE   Result Value Ref Range    POCT Glucose 170 (H) 74 - 99 mg/dL   CBC   Result Value Ref Range    WBC 8.8 4.4 - 11.3 x10*3/uL    nRBC 0.0 0.0 - 0.0 /100 WBCs    RBC 4.41 4.00 - 5.20 x10*6/uL    Hemoglobin 12.5 12.0 - 16.0 g/dL    Hematocrit 38.3 36.0 - 46.0 %    MCV 87 80 - 100 fL    MCH 28.3 26.0 - 34.0 pg    MCHC 32.6 32.0 - 36.0 g/dL    RDW 15.4 (H) 11.5 - 14.5 %    Platelets 248 150 - 450 x10*3/uL   Renal Function Panel   Result Value Ref Range    Glucose 107 (H) 74 - 99 mg/dL    Sodium 138 136 - 145 mmol/L    Potassium 3.7 3.5 - 5.3 mmol/L    Chloride 104 98 - 107 mmol/L    Bicarbonate 25 21 - 32 mmol/L    Anion Gap 13 10 - 20 mmol/L    Urea Nitrogen 8 6 - 23 mg/dL    Creatinine 0.77 0.50 - 1.05 mg/dL    eGFR 85 >60 mL/min/1.73m*2    Calcium 8.6 8.6 - 10.3 mg/dL    Phosphorus 3.5 2.5 - 4.9 mg/dL    Albumin 3.5 3.4 - 5.0 g/dL   Magnesium   Result Value Ref Range    Magnesium 1.94 1.60 - 2.40 mg/dL   POCT GLUCOSE   Result Value Ref Range    POCT Glucose 117 (H) 74 - 99 mg/dL   Electrocardiogram, 12-lead PRN ACS symptoms   Result Value Ref Range    Ventricular Rate 66 BPM    Atrial Rate 66 BPM    KS Interval 198 ms    QRS Duration 86 ms    QT Interval 448 ms    QTC Calculation(Bazett) 469 ms    P Axis 59 degrees    R Axis 35 degrees    T Axis 70 degrees    QRS Count 11 beats    Q Onset 212 ms    P Onset 113 ms    P Offset 171 ms    T Offset 436 ms    QTC Fredericia 462 ms        Medications  Scheduled medications:  amLODIPine, 10 mg, oral, Daily  aspirin, 81 mg, oral, Daily  atorvastatin, 40 mg, oral, Nightly  heparin (porcine), 7,500 Units, subcutaneous, q8h PARTH  insulin lispro, 0-5 Units, subcutaneous, TID  pantoprazole, 40 mg, oral, BID AC  PARoxetine, 10 mg, oral, Nightly  perflutren lipid microspheres, 0.5-10 mL of dilution, intravenous, Once in  imaging  perflutren protein A microsphere, 0.5 mL, intravenous, Once in imaging  sulfur hexafluoride microsphr, 2 mL, intravenous, Once in imaging      Continuous medications:     PRN medications:  PRN medications: acetaminophen, aminophylline, dextrose, dextrose, glucagon, glucagon, ondansetron, promethazine     Physical Exam  Constitutional:       General: She is not in acute distress.     Appearance: Normal appearance. She is obese.      Comments: Obese AA female, sitting upright on edge of bed   HENT:      Head: Normocephalic and atraumatic.      Right Ear: External ear normal.      Left Ear: External ear normal.      Nose: Nose normal.      Mouth/Throat:      Mouth: Mucous membranes are moist.      Pharynx: Oropharynx is clear.   Eyes:      Extraocular Movements: Extraocular movements intact.      Conjunctiva/sclera: Conjunctivae normal.      Pupils: Pupils are equal, round, and reactive to light.   Cardiovascular:      Rate and Rhythm: Normal rate and regular rhythm.      Pulses: Normal pulses.      Heart sounds: Normal heart sounds.   Pulmonary:      Effort: Pulmonary effort is normal. No respiratory distress.      Breath sounds: Normal breath sounds. No wheezing, rhonchi or rales.   Abdominal:      General: Bowel sounds are normal.      Palpations: Abdomen is soft.      Tenderness: There is no abdominal tenderness. There is no right CVA tenderness, left CVA tenderness, guarding or rebound.   Musculoskeletal:         General: No swelling. Normal range of motion.      Cervical back: Normal range of motion and neck supple.   Skin:     General: Skin is warm and dry.      Capillary Refill: Capillary refill takes less than 2 seconds.      Findings: No lesion or rash.   Neurological:      General: No focal deficit present.      Mental Status: She is alert and oriented to person, place, and time. Mental status is at baseline.   Psychiatric:         Mood and Affect: Mood normal.         Behavior: Behavior normal.                   Code Status  Full Code     Assessment/Plan   This patient currently has cardiac telemetry ordered; if you would like to modify or discontinue the telemetry order, click here to go to the orders activity to modify/discontinue the order.  Chest Pain  Atypical, suspect esophagitis- PPI BID  Monitor on tele  Appreciate cardiology recommendations  Stress test  Echo  Check A1c  ASA/statin    Esophagitis  Dysphagia  Seen by GI, recommends PPI therapy and outpatient EGD with Dr. Pickett, GI office will arrange    Hypertension  Continue home medications   Monitor BP and adjust as necessary     Hyperlipidemia  Statin    Severe obesity  Severe obesity requiring increased utilization of hospital resources as demonstrated by BMI 40.23    NIDDM II   Carb controlled diet  Check A1c  Sliding scale insulin  Holding home oral agents  Hypoglycemia protocol    Tobacco use disorder  Smoking cessation education >3 minutes provided     Anxiety/Depression  Continue home medications     Chronic LBP    DVT ppx: subcutaneous heparin        Please see orders for more complete plan    Lillie Henderson PA-C

## 2024-06-24 NOTE — CARE PLAN
The patient's goals for the shift include      The clinical goals for the shift include Pt. will remain free from falls throughout shift      Problem: Pain  Goal: Takes deep breaths with improved pain control throughout the shift  Outcome: Progressing  Goal: Turns in bed with improved pain control throughout the shift  Outcome: Progressing  Goal: Walks with improved pain control throughout the shift  Outcome: Progressing  Goal: Performs ADL's with improved pain control throughout shift  Outcome: Progressing  Goal: Participates in PT with improved pain control throughout the shift  Outcome: Progressing  Goal: Free from opioid side effects throughout the shift  Outcome: Progressing  Goal: Free from acute confusion related to pain meds throughout the shift  Outcome: Progressing     Problem: Pain - Adult  Goal: Verbalizes/displays adequate comfort level or baseline comfort level  Outcome: Progressing     Problem: Safety - Adult  Goal: Free from fall injury  Outcome: Progressing     Problem: Discharge Planning  Goal: Discharge to home or other facility with appropriate resources  Outcome: Progressing     Problem: Chronic Conditions and Co-morbidities  Goal: Patient's chronic conditions and co-morbidity symptoms are monitored and maintained or improved  Outcome: Progressing     Problem: Diabetes  Goal: Achieve decreasing blood glucose levels by end of shift  Outcome: Progressing  Goal: Increase stability of blood glucose readings by end of shift  Outcome: Progressing  Goal: Decrease in ketones present in urine by end of shift  Outcome: Progressing  Goal: Maintain electrolyte levels within acceptable range throughout shift  Outcome: Progressing  Goal: Maintain glucose levels >70mg/dl to <250mg/dl throughout shift  Outcome: Progressing  Goal: No changes in neurological exam by end of shift  Outcome: Progressing  Goal: Learn about and adhere to nutrition recommendations by end of shift  Outcome: Progressing  Goal: Vital  signs within normal range for age by end of shift  Outcome: Progressing  Goal: Increase self care and/or family involovement by end of shift  Outcome: Progressing  Goal: Receive DSME education by end of shift  Outcome: Progressing

## 2024-06-25 ENCOUNTER — APPOINTMENT (OUTPATIENT)
Dept: PRIMARY CARE | Facility: CLINIC | Age: 64
End: 2024-06-25
Payer: COMMERCIAL

## 2024-06-25 ENCOUNTER — DOCUMENTATION (OUTPATIENT)
Dept: CARE COORDINATION | Facility: CLINIC | Age: 64
End: 2024-06-25

## 2024-06-25 ENCOUNTER — TELEPHONE (OUTPATIENT)
Dept: PRIMARY CARE | Facility: CLINIC | Age: 64
End: 2024-06-25

## 2024-06-25 VITALS
SYSTOLIC BLOOD PRESSURE: 133 MMHG | OXYGEN SATURATION: 92 % | WEIGHT: 293 LBS | DIASTOLIC BLOOD PRESSURE: 83 MMHG | RESPIRATION RATE: 20 BRPM | TEMPERATURE: 96.9 F | HEIGHT: 72 IN | HEART RATE: 102 BPM | BODY MASS INDEX: 39.68 KG/M2

## 2024-06-25 DIAGNOSIS — M08.861: ICD-10-CM

## 2024-06-25 DIAGNOSIS — Z09 HOSPITAL DISCHARGE FOLLOW-UP: Primary | ICD-10-CM

## 2024-06-25 DIAGNOSIS — N95.1 HOT FLASHES DUE TO MENOPAUSE: ICD-10-CM

## 2024-06-25 DIAGNOSIS — F17.210 SMOKES CIGARETTES: ICD-10-CM

## 2024-06-25 DIAGNOSIS — E78.2 HYPERLIPIDEMIA, MIXED: ICD-10-CM

## 2024-06-25 DIAGNOSIS — I10 BENIGN ESSENTIAL HYPERTENSION: ICD-10-CM

## 2024-06-25 DIAGNOSIS — R53.81 PHYSICAL DECONDITIONING: ICD-10-CM

## 2024-06-25 DIAGNOSIS — R06.02 SOB (SHORTNESS OF BREATH) ON EXERTION: ICD-10-CM

## 2024-06-25 DIAGNOSIS — R07.89 ATYPICAL CHEST PAIN: ICD-10-CM

## 2024-06-25 DIAGNOSIS — E11.9 TYPE 2 DIABETES MELLITUS WITHOUT COMPLICATION, WITHOUT LONG-TERM CURRENT USE OF INSULIN (MULTI): ICD-10-CM

## 2024-06-25 PROCEDURE — 1123F ACP DISCUSS/DSCN MKR DOCD: CPT | Performed by: STUDENT IN AN ORGANIZED HEALTH CARE EDUCATION/TRAINING PROGRAM

## 2024-06-25 PROCEDURE — 3079F DIAST BP 80-89 MM HG: CPT | Performed by: STUDENT IN AN ORGANIZED HEALTH CARE EDUCATION/TRAINING PROGRAM

## 2024-06-25 PROCEDURE — 99406 BEHAV CHNG SMOKING 3-10 MIN: CPT | Performed by: STUDENT IN AN ORGANIZED HEALTH CARE EDUCATION/TRAINING PROGRAM

## 2024-06-25 PROCEDURE — 1160F RVW MEDS BY RX/DR IN RCRD: CPT | Performed by: STUDENT IN AN ORGANIZED HEALTH CARE EDUCATION/TRAINING PROGRAM

## 2024-06-25 PROCEDURE — 1159F MED LIST DOCD IN RCRD: CPT | Performed by: STUDENT IN AN ORGANIZED HEALTH CARE EDUCATION/TRAINING PROGRAM

## 2024-06-25 PROCEDURE — 3048F LDL-C <100 MG/DL: CPT | Performed by: STUDENT IN AN ORGANIZED HEALTH CARE EDUCATION/TRAINING PROGRAM

## 2024-06-25 PROCEDURE — 3062F POS MACROALBUMINURIA REV: CPT | Performed by: STUDENT IN AN ORGANIZED HEALTH CARE EDUCATION/TRAINING PROGRAM

## 2024-06-25 PROCEDURE — 1125F AMNT PAIN NOTED PAIN PRSNT: CPT | Performed by: STUDENT IN AN ORGANIZED HEALTH CARE EDUCATION/TRAINING PROGRAM

## 2024-06-25 PROCEDURE — 3008F BODY MASS INDEX DOCD: CPT | Performed by: STUDENT IN AN ORGANIZED HEALTH CARE EDUCATION/TRAINING PROGRAM

## 2024-06-25 PROCEDURE — 3075F SYST BP GE 130 - 139MM HG: CPT | Performed by: STUDENT IN AN ORGANIZED HEALTH CARE EDUCATION/TRAINING PROGRAM

## 2024-06-25 PROCEDURE — 99215 OFFICE O/P EST HI 40 MIN: CPT | Performed by: STUDENT IN AN ORGANIZED HEALTH CARE EDUCATION/TRAINING PROGRAM

## 2024-06-25 PROCEDURE — 3044F HG A1C LEVEL LT 7.0%: CPT | Performed by: STUDENT IN AN ORGANIZED HEALTH CARE EDUCATION/TRAINING PROGRAM

## 2024-06-25 RX ORDER — DOBUTAMINE HYDROCHLORIDE 100 MG/100ML
5-20 INJECTION INTRAVENOUS CONTINUOUS
OUTPATIENT
Start: 2024-06-25

## 2024-06-25 RX ORDER — PRAVASTATIN SODIUM 40 MG/1
40 TABLET ORAL DAILY
Qty: 90 TABLET | Refills: 1 | Status: SHIPPED | OUTPATIENT
Start: 2024-06-25

## 2024-06-25 RX ORDER — AMLODIPINE BESYLATE 10 MG/1
10 TABLET ORAL DAILY
Qty: 90 TABLET | Refills: 1 | Status: SHIPPED | OUTPATIENT
Start: 2024-06-25

## 2024-06-25 RX ORDER — PAROXETINE 10 MG/1
10 TABLET, FILM COATED ORAL NIGHTLY
Qty: 90 TABLET | Refills: 1 | Status: SHIPPED | OUTPATIENT
Start: 2024-06-25

## 2024-06-25 SDOH — ECONOMIC STABILITY: FOOD INSECURITY: WITHIN THE PAST 12 MONTHS, YOU WORRIED THAT YOUR FOOD WOULD RUN OUT BEFORE YOU GOT MONEY TO BUY MORE.: NEVER TRUE

## 2024-06-25 SDOH — ECONOMIC STABILITY: FOOD INSECURITY: WITHIN THE PAST 12 MONTHS, THE FOOD YOU BOUGHT JUST DIDN'T LAST AND YOU DIDN'T HAVE MONEY TO GET MORE.: NEVER TRUE

## 2024-06-25 ASSESSMENT — ENCOUNTER SYMPTOMS
DIZZINESS: 0
SHORTNESS OF BREATH: 0
UNEXPECTED WEIGHT CHANGE: 0
FATIGUE: 0
HEADACHES: 0
NAUSEA: 0
VOMITING: 0
WHEEZING: 0
FEVER: 0
ABDOMINAL PAIN: 0
COUGH: 0
CHILLS: 0
DIARRHEA: 0
CONFUSION: 0
PALPITATIONS: 0
COLOR CHANGE: 0
MUSCULOSKELETAL NEGATIVE: 1
CONSTIPATION: 0

## 2024-06-25 ASSESSMENT — PATIENT HEALTH QUESTIONNAIRE - PHQ9
1. LITTLE INTEREST OR PLEASURE IN DOING THINGS: NOT AT ALL
2. FEELING DOWN, DEPRESSED OR HOPELESS: NOT AT ALL
SUM OF ALL RESPONSES TO PHQ9 QUESTIONS 1 & 2: 0

## 2024-06-25 ASSESSMENT — LIFESTYLE VARIABLES
HOW OFTEN DO YOU HAVE SIX OR MORE DRINKS ON ONE OCCASION: NEVER
HOW MANY STANDARD DRINKS CONTAINING ALCOHOL DO YOU HAVE ON A TYPICAL DAY: PATIENT DOES NOT DRINK
SKIP TO QUESTIONS 9-10: 1
AUDIT-C TOTAL SCORE: 0
HOW OFTEN DO YOU HAVE A DRINK CONTAINING ALCOHOL: NEVER

## 2024-06-25 ASSESSMENT — PAIN SCALES - GENERAL: PAINLEVEL: 8

## 2024-06-25 NOTE — PROGRESS NOTES
Subjective   Patient ID: Macarena Adler is a 64 y.o. female who presents for Hospital Follow-up (Discharge Diagnosis: Chest pain/Admission Date: 6/23/24/Discharge Date: 6/24/24/) and Follow-up (Patient is requesting a script for a few DME items.   A scale to keep up with her weight loss, and a raised toilet seat).    HPI   She is here for hospital discharge follow up. She was in the hosp from 06/23/24-06/24/24 for chest pain, thought cardiac vs esophagitis; had CTA chest & CXR w/ no acute findings; saw GI who rec outpt EGD, also started on PPI 40 mg bid and also saw cards who rec outpt stress test and was advised to get from primary provider.  Of note: Patient does not follow with a cardiology as outpatient.  Reports her chest pain slightly improved but it still comes and goes, not as bad as before.  Reports some dyspnea with exertion but denies palpitation, headache, nausea/vomiting, diarrhea and other associated symptoms.  Patient continues to smoke about 1 PPD times many years.  Not interested to quit now.  Also request DME supplies as scheduled to check her weight and also raised toilet seat as he is having difficulty to sit on the low-lying toilet.  States her chronic problems are stable, taking all meds as prescribed.  Her IO /83.  Reports mood is a stable, denies SI/HI and or panic attacks.     Review of Systems   Constitutional:  Negative for chills, fatigue, fever and unexpected weight change.   HENT: Negative.     Respiratory:  Negative for cough, shortness of breath and wheezing.    Cardiovascular:  Positive for chest pain. Negative for palpitations and leg swelling.   Gastrointestinal:  Negative for abdominal pain, constipation, diarrhea, nausea and vomiting.   Musculoskeletal: Negative.    Skin:  Negative for color change and rash.   Neurological:  Negative for dizziness and headaches.   Psychiatric/Behavioral:  Negative for behavioral problems and confusion.        Objective   /83 (BP  "Location: Left arm, Patient Position: Sitting, BP Cuff Size: Large adult)   Pulse 102   Temp 36.1 °C (96.9 °F) (Temporal)   Resp 20   Ht 1.854 m (6' 1\")   Wt 134 kg (296 lb)   SpO2 92%   BMI 39.05 kg/m²     Physical Exam  Vitals and nursing note reviewed.   Constitutional:       Appearance: Normal appearance. She is obese.   Cardiovascular:      Rate and Rhythm: Normal rate and regular rhythm.      Pulses: Normal pulses.      Heart sounds: Normal heart sounds.   Pulmonary:      Effort: Pulmonary effort is normal.      Breath sounds: Normal breath sounds. No wheezing or rhonchi.   Abdominal:      General: Abdomen is flat. Bowel sounds are normal.      Palpations: Abdomen is soft.   Musculoskeletal:         General: Normal range of motion.      Comments: No anterior chest tenderness on palpation.   Neurological:      General: No focal deficit present.      Mental Status: She is alert.   Psychiatric:         Mood and Affect: Mood normal.         Behavior: Behavior normal.       Assessment/Plan   She is here for hospital discharge follow up. She was in the hosp from 06/23/24-06/24/24 for chest pain, had CTA chest & CXR w/ no acute findings and also bld work with grossly normal result.   Unclear etiology of her ongoing chest pain, could be cardiac VS esophagitis VS others.  Will obtain a stress echo to evaluate further.  Also advised to get appointment with the GI for possible EGD as recommended during hospital stay.  Continue PPI 40 mg twice daily as prescribed from hospital.  Avoid any spicy food or any food that triggers the symptoms.  Seek ER care for any worsening of chest pain and or new concerning symptoms.  Chronic problems are stable, continue all meds as prescribed.  BP remains close to goal.  Rx refilled as requested.  Also will provide Rx for DME's as requested: Raised toilet seat and weighing his scale, both Rx faxed to DME store.  Highly encouraged to quit/cut down on smoking cigarettes, currently " smokes 1 ppd times many years.  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           Other Visit Diagnoses         Codes    Hospital discharge follow-up    -  Primary Z09    Physical deconditioning     R53.81    Relevant Orders    General supply request weighing scale.    General supply request raised toilet seat with removable arms    Hot flashes due to menopause     N95.1    Relevant Medications    PARoxetine (Paxil) 10 mg tablet    Atypical chest pain     R07.89    Relevant Orders    Echocardiogram Stress Test    SOB (shortness of breath) on exertion     R06.02    Relevant Orders    Echocardiogram Stress Test    Smokes cigarettes     F17.210          Rtc 3 mo for Domenic Kelly MD    [facility]

## 2024-06-25 NOTE — TELEPHONE ENCOUNTER
Patient insurance company called to ask for an order for a raised toilet seat    They would like this sent to [facility]    Fax: [phone]

## 2024-06-25 NOTE — PROGRESS NOTES
Discharge Facility: [facility]  Discharge Diagnosis: Chest pain  Admission Date: 6/23/24  Discharge Date: 6/24/24    PCP Appointment Date: 6/25/24  Specialist Appointment Date: n/a  Hospital Encounter and Summary: Linked     PCP appointment within 2 days of discharge.

## 2024-06-26 NOTE — DISCHARGE SUMMARY
Admission Date: 6/23/2024 12:00 AM  Discharge Date: 06/24/2024  Condition at discharge: Stable    Discharge Diagnosis  Atypical chest Pain- suspect esophagitis  Dysphagia  Hypertension  Hyperlipidemia  Obesity  NIDDM II   Tobacco use disorder  Anxiety/Depression  Chronic LBP    Test Results Pending At Discharge  Pending Labs       No current pending labs.            Hospital Course   Macarena Adler is a 64 y.o. -American female with a past medical history significant for hypertension, hyperlipidemia, NIDDM 2, tobacco use disorder, anxiety, depression, obesity and chronic lower back pain. Patient presented to the ED with progressively worsening midsternal/right-sided chest pain that started about 2 days ago. She states that the symptoms are much worse with any type of activity similar also present at rest secondary to constant sensation that is worsened with eating and drinking. She describes a burning-like sensation but also noted a pressure-like sensation in the lower midsternal/epigastric area that did not necessarily radiate into her neck/back/upper extremities. She states that it also feels like a sensation of the food getting stuck or moving through slowly. She denies any inciting events. Should be noted that the patient's heart rate was initially as high as 124 within the first hour of presentation but since then has decreased to less than 90s from mid 80s to high 90s. EKG: Sinus rhythm, borderline T wave abnormalities, heart rate of 101, without any acute ST segment changes and appears similar to prior EKGs. BNP 29. HSTI 6-7. TSH WNL. CXR negative. CTA chest noted no acute pulmonary emboli, body habitus limiting sensitivity, thick-walled esophagus in the midportion probably reflecting a component of mild esophagitis, slight hepatic steatosis. Seen by GI, agrees with PPI therapy, plans for outpatient EGD with her primary Gastroenterologist Dr. Pickett. Echo: EF 70%, no WMA, +DD, bubble study negative.  Normal stress myocardial perfusion imaging in response to pharmacologic stress.    Consultations: Cardiology consulted- treatment options were discussed and plan of care agreed upon. and Gastroenterology consulted- treatment options were discussed and plan of care agreed upon.    Pertinent Physical Exam At Time of Discharge  Constitutional:       General: She is not in acute distress.     Appearance: Normal appearance. She is obese.      Comments: Obese AA female, sitting upright on edge of bed   HENT:      Head: Normocephalic and atraumatic.      Right Ear: External ear normal.      Left Ear: External ear normal.      Nose: Nose normal.      Mouth/Throat:      Mouth: Mucous membranes are moist.      Pharynx: Oropharynx is clear.   Eyes:      Extraocular Movements: Extraocular movements intact.      Conjunctiva/sclera: Conjunctivae normal.      Pupils: Pupils are equal, round, and reactive to light.   Cardiovascular:      Rate and Rhythm: Normal rate and regular rhythm.      Pulses: Normal pulses.      Heart sounds: Normal heart sounds.   Pulmonary:      Effort: Pulmonary effort is normal. No respiratory distress.      Breath sounds: Normal breath sounds. No wheezing, rhonchi or rales.   Abdominal:      General: Bowel sounds are normal.      Palpations: Abdomen is soft.      Tenderness: There is no abdominal tenderness. There is no right CVA tenderness, left CVA tenderness, guarding or rebound.   Musculoskeletal:         General: No swelling. Normal range of motion.      Cervical back: Normal range of motion and neck supple.   Skin:     General: Skin is warm and dry.      Capillary Refill: Capillary refill takes less than 2 seconds.      Findings: No lesion or rash.   Neurological:      General: No focal deficit present.      Mental Status: She is alert and oriented to person, place, and time. Mental status is at baseline.   Psychiatric:         Mood and Affect: Mood normal.         Behavior: Behavior  normal.    Code Status  Full Code     Home Medications     Medication List      START taking these medications     pantoprazole 40 mg EC tablet; Commonly known as: ProtoNix; Take 1 tablet   (40 mg) by mouth 2 times a day before meals. Do not crush, chew, or split.     CONTINUE taking these medications     BD Alcohol Swabs pads, medicated; Generic drug: alcohol swabs; TESTING   ONCE DAILY   cholecalciferol 25 MCG (1000 UT) capsule; Commonly known as: Vitamin   D-3; Take 1 capsule (25 mcg) by mouth once daily.   True Metrix Glucose Test Strip strip; Generic drug: blood sugar   diagnostic; USE 1 STRIP TWICE DAILY.   Unilet Lancet 33 gauge misc; Generic drug: lancets; USE AS DIRECTED ONCE   DAILY       Outpatient Follow-Up  Future Appointments   Date Time Provider Department [place]   7/1/2024  1:40 PM Loc Cordero DPM [id number] [facility]   8/29/2024  1:40 PM Adilson Kelly MD [id number] [facility]   10/22/2024 11:00 AM [address] [address]         At the time of discharge, patient's pain was controlled with oral analgesia, patient was urinating, having BMs, sleeping, and eating well. Follow up recommendations are in discharge paperwork. Discharge plan was discussed with the patient/family and all of the questions were answered. Medications were ordered to be delivered to bedside prior to discharge.     Discharge planning took greater than 35 minutes    Diagnoses at time of discharge:  Atypical chest Pain- suspect esophagitis  Dysphagia  Hypertension  Hyperlipidemia  Obesity  NIDDM II   Tobacco use disorder  Anxiety/Depression  Chronic LBP    Anticipated discharge destination: home    Please see orders for more complete plan    Lillie Henderson PA-C

## 2024-06-27 LAB
ATRIAL RATE: 101 BPM
P AXIS: 9 DEGREES
PR INTERVAL: 203 MS
Q ONSET: 249 MS
QRS COUNT: 17 BEATS
QRS DURATION: 93 MS
QT INTERVAL: 373 MS
QTC CALCULATION(BAZETT): 484 MS
QTC FREDERICIA: 443 MS
R AXIS: 55 DEGREES
T AXIS: 29 DEGREES
T OFFSET: 436 MS
VENTRICULAR RATE: 101 BPM

## 2024-06-28 ENCOUNTER — TELEPHONE (OUTPATIENT)
Dept: GASTROENTEROLOGY | Facility: CLINIC | Age: 64
End: 2024-06-28
Payer: COMMERCIAL

## 2024-06-28 NOTE — TELEPHONE ENCOUNTER
----- Message from Mary Melgar MA sent at 6/26/2024  9:05 AM EDT -----  Regarding: RE: EGD  Left message on machine to return call     ----- Message -----  From: Mary Melgar MA  Sent: 6/24/2024  11:06 AM EDT  To: [id number]  Subject: EGD                                              PATIENT CURRENTLY INPATIENT IN THE HOSPITAL  ----- Message -----  From: Cara Keller PA-C  Sent: 6/24/2024  10:26 AM EDT  To: Mary Melgar MA    Patient needs scheduled for EGD in endo with Dr. Pickett per Dr. Pierre. Dr. Pierre placed the order.

## 2024-07-01 ENCOUNTER — APPOINTMENT (OUTPATIENT)
Dept: PODIATRY | Facility: CLINIC | Age: 64
End: 2024-07-01
Payer: COMMERCIAL

## 2024-07-01 VITALS — DIASTOLIC BLOOD PRESSURE: 67 MMHG | TEMPERATURE: 96.9 F | HEART RATE: 87 BPM | SYSTOLIC BLOOD PRESSURE: 113 MMHG

## 2024-07-01 DIAGNOSIS — E11.9 TYPE 2 DIABETES MELLITUS WITHOUT COMPLICATION, WITHOUT LONG-TERM CURRENT USE OF INSULIN (MULTI): ICD-10-CM

## 2024-07-01 DIAGNOSIS — B35.1 TINEA UNGUIUM: Primary | ICD-10-CM

## 2024-07-01 PROCEDURE — 3074F SYST BP LT 130 MM HG: CPT | Performed by: PODIATRIST

## 2024-07-01 PROCEDURE — 3048F LDL-C <100 MG/DL: CPT | Performed by: PODIATRIST

## 2024-07-01 PROCEDURE — 1123F ACP DISCUSS/DSCN MKR DOCD: CPT | Performed by: PODIATRIST

## 2024-07-01 PROCEDURE — 1159F MED LIST DOCD IN RCRD: CPT | Performed by: PODIATRIST

## 2024-07-01 PROCEDURE — 3044F HG A1C LEVEL LT 7.0%: CPT | Performed by: PODIATRIST

## 2024-07-01 PROCEDURE — 3062F POS MACROALBUMINURIA REV: CPT | Performed by: PODIATRIST

## 2024-07-01 PROCEDURE — 3078F DIAST BP <80 MM HG: CPT | Performed by: PODIATRIST

## 2024-07-01 PROCEDURE — 1160F RVW MEDS BY RX/DR IN RCRD: CPT | Performed by: PODIATRIST

## 2024-07-01 PROCEDURE — 99202 OFFICE O/P NEW SF 15 MIN: CPT | Performed by: PODIATRIST

## 2024-07-01 PROCEDURE — 3008F BODY MASS INDEX DOCD: CPT | Performed by: PODIATRIST

## 2024-07-01 RX ORDER — CICLOPIROX 80 MG/ML
SOLUTION TOPICAL NIGHTLY
Qty: 6.6 ML | Refills: 3 | Status: SHIPPED | OUTPATIENT
Start: 2024-07-01

## 2024-07-01 NOTE — PROGRESS NOTES
CC:  painful thickened and elongated toenails and diabetic care.     HPI: Pt presents for dm foot exam and painful thickened and elongated toenails that are difficult to manage.  Onset was gradual with worsening course until recently.  Aggravated by shoe gear and ambulation. No acute issues.      PCP: Dr. Kelly  Last visit: 6-25-24     PMH  Past Medical History:   Diagnosis Date    Diabetes (Multi)     Hyperlipidemia     Hypertension     Personal history of other diseases of the circulatory system     History of hypertension    Personal history of other endocrine, nutritional and metabolic disease     History of hyperlipidemia     MEDS    Current Outpatient Medications:     alcohol swabs (BD Alcohol Swabs) pads, medicated, TESTING ONCE DAILY, Disp: 100 each, Rfl: 2    amLODIPine (Norvasc) 10 mg tablet, Take 1 tablet (10 mg) by mouth once daily., Disp: 90 tablet, Rfl: 1    cholecalciferol (Vitamin D-3) 25 MCG (1000 UT) capsule, Take 1 capsule (25 mcg) by mouth once daily., Disp: 90 capsule, Rfl: 1    empagliflozin (Jardiance) 25 mg, Take 1 tablet (25 mg) by mouth once daily., Disp: 90 tablet, Rfl: 1    pantoprazole (ProtoNix) 40 mg EC tablet, Take 1 tablet (40 mg) by mouth 2 times a day before meals. Do not crush, chew, or split., Disp: 60 tablet, Rfl: 0    PARoxetine (Paxil) 10 mg tablet, Take 1 tablet (10 mg) by mouth once daily at bedtime., Disp: 90 tablet, Rfl: 1    pravastatin (Pravachol) 40 mg tablet, Take 1 tablet (40 mg) by mouth once daily., Disp: 90 tablet, Rfl: 1    True Metrix Glucose Test Strip strip, USE 1 STRIP TWICE DAILY., Disp: 100 strip, Rfl: 2    Unilet Lancet 33 gauge misc, USE AS DIRECTED ONCE DAILY, Disp: 100 each, Rfl: 2  Allergies  No Known Allergies  Social History     Socioeconomic History    Marital status: Single     Spouse name: None    Number of children: None    Years of education: None    Highest education level: None   Occupational History    None   Tobacco Use    Smoking status:  Every Day     Current packs/day: 0.50     Average packs/day: 0.7 packs/day for 58.5 years (39.2 ttl pk-yrs)     Types: Cigarettes     Start date: 1986    Smokeless tobacco: Never   Vaping Use    Vaping status: Never Used   Substance and Sexual Activity    Alcohol use: Never    Drug use: Never    Sexual activity: Not Currently   Other Topics Concern    None   Social History Narrative    None     Social Determinants of Health     Financial Resource Strain: Low Risk  (6/24/2024)    Overall Financial Resource Strain (CARDIA)     Difficulty of Paying Living Expenses: Not hard at all   Food Insecurity: No Food Insecurity (6/25/2024)    Hunger Vital Sign     Worried About Running Out of Food in the Last Year: Never true     Ran Out of Food in the Last Year: Never true   Transportation Needs: No Transportation Needs (6/24/2024)    PRAPARE - Transportation     Lack of Transportation (Medical): No     Lack of Transportation (Non-Medical): No   Physical Activity: Not on file   Stress: Not on file   Social Connections: Not on file   Intimate Partner Violence: Not on file   Housing Stability: Low Risk  (6/24/2024)    Housing Stability Vital Sign     Unable to Pay for Housing in the Last Year: No     Number of Places Lived in the Last Year: 1     Unstable Housing in the Last Year: No     Family History   Problem Relation Name Age of Onset    Colon cancer Mother      No Known Problems Father      Colon cancer Other       Past Surgical History:   Procedure Laterality Date    HYSTERECTOMY  08/17/2017    Hysterectomy       REVIEW OF SYSTEMS  DERM:   + as noted in HPI.       Physical examination:   On General Observation: Patient is a pleasant, cooperative, well developed 64 y.o. diabetic   adult female. The patient is alert and oriented to time, place and person. Patient has normal affect and mood.  /67   Pulse 87   Temp 36.1 °C (96.9 °F)     Vascular:  DP and PT pulses are 2/4 b/l.  mild edema noted. no varicosities b/l.   CFT  5 seconds to all digits bilateral.  Skin temperature is warm to warm from proximal to distal bilateral.      Muscular: Strength is 5/5 for all instrinsic and extrinsic muscle groups.     Neuro:  Proprioception present.   Sensation to vibration is  intact. Protective sensation present  at all pedal sites via [place] Adam 5.07 monofilament bilateral.  Light touch present bilateral.     Derm:    Left toenails: 1-5 Brittleness, crumbling upon debridement, subungual debris, elongation, mycotic appearance, tenderness, and thickness.   Right toenails: 1-5 Brittleness, crumbling upon debridement, subungual debris, elongation, mycotic appearance, tenderness, and thickness.   Hair growth is decreased b/l le    ASSESSMENT:    Tinea Unguium [B35.1]   E11.9  Pain in right toe(s) [M79.674]   Pain in left toe(s) [M79.675]       PLAN:   Consult  A comprehensive history and physical examination were preformed. DM foot care and DM foot manifestations were reviewed.  The patient was educated on clinical findings, diagnosis and treatment plans. Patient understands all that has been explained and all questions were answered to apparent satisfaction.   -Reviewed etiology and options for nail fungus, will start topical penlac, follow up 3 months.      Loc Cordero DPM

## 2024-07-02 ENCOUNTER — APPOINTMENT (OUTPATIENT)
Dept: GASTROENTEROLOGY | Facility: CLINIC | Age: 64
End: 2024-07-02
Payer: COMMERCIAL

## 2024-07-02 VITALS
OXYGEN SATURATION: 96 % | WEIGHT: 293 LBS | SYSTOLIC BLOOD PRESSURE: 134 MMHG | HEIGHT: 72 IN | HEART RATE: 72 BPM | DIASTOLIC BLOOD PRESSURE: 74 MMHG | BODY MASS INDEX: 39.68 KG/M2

## 2024-07-02 DIAGNOSIS — K21.9 GASTROESOPHAGEAL REFLUX DISEASE, UNSPECIFIED WHETHER ESOPHAGITIS PRESENT: Primary | ICD-10-CM

## 2024-07-02 DIAGNOSIS — E11.9 TYPE 2 DIABETES MELLITUS WITHOUT COMPLICATION, WITHOUT LONG-TERM CURRENT USE OF INSULIN (MULTI): Primary | ICD-10-CM

## 2024-07-02 DIAGNOSIS — R13.10 DYSPHAGIA, UNSPECIFIED TYPE: ICD-10-CM

## 2024-07-02 PROCEDURE — 3075F SYST BP GE 130 - 139MM HG: CPT | Performed by: INTERNAL MEDICINE

## 2024-07-02 PROCEDURE — 3044F HG A1C LEVEL LT 7.0%: CPT | Performed by: INTERNAL MEDICINE

## 2024-07-02 PROCEDURE — 3062F POS MACROALBUMINURIA REV: CPT | Performed by: INTERNAL MEDICINE

## 2024-07-02 PROCEDURE — 3078F DIAST BP <80 MM HG: CPT | Performed by: INTERNAL MEDICINE

## 2024-07-02 PROCEDURE — 1159F MED LIST DOCD IN RCRD: CPT | Performed by: INTERNAL MEDICINE

## 2024-07-02 PROCEDURE — 1123F ACP DISCUSS/DSCN MKR DOCD: CPT | Performed by: INTERNAL MEDICINE

## 2024-07-02 PROCEDURE — 3008F BODY MASS INDEX DOCD: CPT | Performed by: INTERNAL MEDICINE

## 2024-07-02 PROCEDURE — 1160F RVW MEDS BY RX/DR IN RCRD: CPT | Performed by: INTERNAL MEDICINE

## 2024-07-02 PROCEDURE — 3048F LDL-C <100 MG/DL: CPT | Performed by: INTERNAL MEDICINE

## 2024-07-02 PROCEDURE — 99213 OFFICE O/P EST LOW 20 MIN: CPT | Performed by: INTERNAL MEDICINE

## 2024-07-02 NOTE — LETTER
July 3, 2024     Adilson Kelly MD  [address]  [facility], [address]  [facility] [id number]    Patient: Macarena Adler   YOB: 1960   Date of Visit: 7/2/2024       Dear Dr. Adilson Kelly MD:    Thank you for referring Macarena Adler to me for evaluation. Below are my notes for this consultation.  If you have questions, please do not hesitate to call me. I look forward to following your patient along with you.       Sincerely,     Sanjay Pierre MD      CC: No Recipients  ______________________________________________________________________________________        [facility] Gastroenterology    ASSESSMENT and PLAN:       Macarena Adler is a 64 y.o. female with a significant past medical diabetes, HTN, chronic back pain, tobacco use, anxiety/depression, obesity (Body mass index is 40.23 kg/m².), and HLD who presents for follow up from a recent hospitalization.       GERD  Some symptoms consistent with GERD although other aspects of her symptoms as well as the acute onset would be atypical. She has some dysphagia and will need EGD for evaluation. This was ordered, but has not been scheduled. She prefers to have EGD done with Dr. Pickett who did her last colonoscopy. She has had some improvement with PPI.  - continue PPI  - EGD scheduled with Dr. Pickett  - hold Jardiance prior to procedure per anesthesia guidelines        Follow up with GI as needed.        Sanjay Pierre MD        Gastroenterology    [facility]    Clinical   [facility]        Subjective  HISTORY OF PRESENT ILLNESS:     Chief Complaint  Hospital Follow-up    History Of Present Illness:    Macarena Adler is a 64 y.o. female with a significant past medical history of diabetes, HTN, chronic back pain, tobacco use, anxiety/depression, obesity (Body mass index is 40.23 kg/m².), and HLD who presents for follow up from a recent  "hospitalization.    she follows with (Adilson Kelly MD) as her PCP.     She was recently hospitalized (discharged 6/24/2024) after she had presented with chest pain. At that time she was seen by cardiology and they felt that her symptoms were unlikely cardiac in nature. She did undergo stress testing which was normal. While she was hospitalized she reported chest pain with a sharp/pressure sensation in the middle of her chest. This was worse with laying down as well as with movement and eating. She denied heartburn, but did report a globus sensation as well as dysphagia where solid food \"seems to go down slow\". No dysphagia with liquids. She denied any history of heartburn and she was not previously on any PPI. She has never had an EGD. I had recommended an outpatient EGD and she stated that she would prefer that this be done by Dr. Pickett as he had done her recent colonoscopy. I ordered that procedure, but that has not yet been scheduled.      Today she says that she has been doing alright since leaving the hospital. She has not had her EGD scheduled yet. She says that she has not had any further chest pain since leave the hospital. She feels like dysphagia has improved some as well. No nausea/vomiting. Heartburn has resolved.       Endoscopy History:  11/29/2023 - Colonoscopy: two transverse polyps (TA on path)           Review of systems:   Review of Systems   Constitutional:  Negative for chills, fatigue, fever and unexpected weight change.   HENT:  Negative for congestion, sneezing, sore throat, trouble swallowing and voice change.    Respiratory:  Negative for cough, shortness of breath and wheezing.    Cardiovascular:  Negative for chest pain, palpitations and leg swelling.   Gastrointestinal:         As detailed above.   Genitourinary:  Negative for dysuria and hematuria.   Musculoskeletal:  Negative for arthralgias and myalgias.   Skin:  Negative for pallor and rash.   Neurological:  Negative for " "dizziness, seizures, syncope, weakness, numbness and headaches.   Hematological:  Negative for adenopathy. Does not bruise/bleed easily.   Psychiatric/Behavioral:  Negative for confusion. The patient is not nervous/anxious.    All other systems reviewed and are negative.      I performed a complete 10 point review of systems and it is negative except as noted in HPI or above.      PAST HISTORIES:       Past Medical History:  She has a past medical history of Diabetes (Multi), Hyperlipidemia, Hypertension, Personal history of other diseases of the circulatory system, and Personal history of other endocrine, nutritional and metabolic disease.    Past Surgical History:  She has a past surgical history that includes Hysterectomy (08/17/2017).      Social History:  She reports that she has been smoking cigarettes. She started smoking about 38 years ago. She has a 39.3 pack-year smoking history. She has never used smokeless tobacco. She reports that she does not drink alcohol and does not use drugs.    Family History:  No known family history of GI disease, specifically denies any family history of pancreatitis, Crohn's, colon cancer, gastroesophageal cancer, or ulcerative colitis.    Family History   Problem Relation Name Age of Onset   • Colon cancer Mother     • No Known Problems Father     • Colon cancer Other          Allergies:  Patient has no known allergies.      Objective  OBJECTIVE:       Last Recorded Vitals:  Vitals:    07/02/24 1349   BP: 134/74   Pulse: 72   SpO2: 96%   Weight: 135 kg (297 lb)   Height: 1.867 m (6' 1.5\")     /74   Pulse 72   Ht 1.867 m (6' 1.5\")   Wt 135 kg (297 lb)   SpO2 96%   BMI 38.65 kg/m²      Physical Exam:    Physical Exam  Vitals reviewed.   Constitutional:       General: She is not in acute distress.     Appearance: She is obese. She is not ill-appearing.   HENT:      Head: Normocephalic and atraumatic.   Eyes:      General: No scleral icterus.  Cardiovascular:      Rate " and Rhythm: Normal rate and regular rhythm.      Pulses: Normal pulses.      Heart sounds: Normal heart sounds. No murmur heard.  Pulmonary:      Effort: Pulmonary effort is normal. No respiratory distress.      Breath sounds: Normal breath sounds. No wheezing.   Abdominal:      General: Bowel sounds are normal.      Palpations: Abdomen is soft.      Tenderness: There is no abdominal tenderness. There is no rebound.   Musculoskeletal:         General: No swelling or deformity.   Skin:     General: Skin is warm and dry.      Coloration: Skin is not jaundiced.      Findings: No rash.   Neurological:      General: No focal deficit present.      Mental Status: She is alert and oriented to person, place, and time.   Psychiatric:         Mood and Affect: Mood normal.         Behavior: Behavior normal.         Thought Content: Thought content normal.         Judgment: Judgment normal.         Home Medications:  Prior to Admission medications    Medication Sig Start Date End Date Taking? Authorizing Provider   alcohol swabs (BD Alcohol Swabs) pads, medicated TESTING ONCE DAILY 1/22/24   Adilson Kelly MD   amLODIPine (Norvasc) 10 mg tablet Take 1 tablet (10 mg) by mouth once daily. 6/25/24   Adilson Kelly MD   cholecalciferol (Vitamin D-3) 25 MCG (1000 UT) capsule Take 1 capsule (25 mcg) by mouth once daily. 12/12/23   Adilson Kelly MD   ciclopirox (Penlac) 8 % solution Apply topically once daily at bedtime. 7/1/24   Loc Cordero DPM   empagliflozin (Jardiance) 25 mg Take 1 tablet (25 mg) by mouth once daily. 6/25/24   Adilson Kelly MD   pantoprazole (ProtoNix) 40 mg EC tablet Take 1 tablet (40 mg) by mouth 2 times a day before meals. Do not crush, chew, or split. 6/24/24 7/24/24  Lillie Rhodes PA-C   PARoxetine (Paxil) 10 mg tablet Take 1 tablet (10 mg) by mouth once daily at bedtime. 6/25/24   Adilson Kelly MD   pravastatin (Pravachol) 40 mg tablet Take 1 tablet (40 mg) by mouth once daily.  "6/25/24   Adilson Kelly MD   True Metrix Glucose Test Strip strip USE 1 STRIP TWICE DAILY. 1/22/24   Adilson Kelly MD   Unilet Lancet 33 gauge misc USE AS DIRECTED ONCE DAILY 1/22/24   Adilson Kelly MD         Relevant Results Recent labs reviewed in the EMR.    Lab Results   Component Value Date/Time    WBC 8.8 06/24/2024 0415    HGB 12.5 06/24/2024 0415    HGB 13.4 06/23/2024 0015    HGB 13.3 11/15/2023 1336    MCV 87 06/24/2024 0415     06/24/2024 0415     06/23/2024 0015     11/15/2023 1336       Lab Results   Component Value Date/Time     06/24/2024 0415    K 3.7 06/24/2024 0415     06/24/2024 0415    BUN 8 06/24/2024 0415    CREATININE 0.77 06/24/2024 0415    CREATININE 0.99 06/23/2024 0015       Lab Results   Component Value Date/Time    BILITOT 0.3 06/23/2024 0015    BILITOT 0.3 11/15/2023 1336    BILITOT 0.3 04/22/2022 1044    BILITOT 0.4 04/09/2021 1156    ALKPHOS 121 06/23/2024 0015    ALKPHOS 117 11/15/2023 1336    ALKPHOS 109 04/22/2022 1044    ALKPHOS 108 04/09/2021 1156    AST 12 06/23/2024 0015    AST 16 11/15/2023 1336    AST 16 04/22/2022 1044    AST 13 04/09/2021 1156    ALT 14 06/23/2024 0015    ALT 15 11/15/2023 1336    ALT 15 04/22/2022 1044    ALT 14 04/09/2021 1156    LIPASE 10 06/23/2024 0015       No results found for: \"CRP\", \"CALPS\"    Radiology: Imaging reviewed in the EMR.    Nuclear Stress Test    Result Date: 6/24/2024  Interpreted By:  Bridget Baez, STUDY: NUCLEAR STRESS TEST;  6/24/2024 11:15 am   INDICATION: Signs/Symptoms:Chest pain.   COMPARISON: None.   ACCESSION NUMBER(S): [id number]   ORDERING CLINICIAN: NLOBERTO GARCIA   TECHNIQUE: DIVISION OF NUCLEAR MEDICINE PHARMACOLOGIC STRESS MYOCARDIAL PERFUSION SCAN, ONE DAY PROTOCOL   The patient received an intravenous dose of  11.3 mCi of Tc-99m tetrofosmin and resting emission tomographic (SPECT) images of the myocardium were acquired. The patient then received an intravenous infusion of " 0.4mg regadenoson (Lexiscan) followed by an additional dose of  33.3 mCi of Tc-99m tetrofosmin. Stress phase SPECT images of the myocardium were then acquired. These included ECG-gated images to assess and quantify ventricular function.   FINDINGS: Stress and rest images both demonstrate a normal distribution of perfusion throughout all LV segments with no sign of ischemia.   ECG-gated images demonstrate normal LV size and myocardial contractility with an LV ejection fraction of   73 % (normal above 50 percent).       1. Normal stress myocardial perfusion imaging in response to pharmacologic stress. 2. Well-maintained left ventricular function.       Signed by: Bridget Baez 6/24/2024 3:05 PM Dictation workstation:   [id number]    Cardiology Interpretation Of Nuclear Stress - See Other Report For Nuclear Portion  Result Date: 6/24/2024  The patient developed no symptoms during the stress exam. The blood pressure response was normal.  Baseline ECG: Resting ECG showed Sinus Arrhythmia with T wave abnormality.Summary:  1. Correlate with myocardial perfusion imaging results.  2. No clinical or electrocardiographic evidence for ischemia at maximal infusion.  3. Nuclear image results are reported separately.       Transthoracic Echo (TTE) Complete  Result Date: 6/24/2024  Left Ventricle: The left ventricular systolic function is normal, with a Alvares's biplane calculated ejection fraction of 70%. There are no regional wall motion abnormalities. The left ventricular cavity size is normal. There is moderate concentric left ventricular hypertrophy. Spectral Doppler shows an impaired relaxation pattern of left ventricular diastolic filling. Left Atrium: The left atrium is normal in size. A bubble study using agitated saline was performed. Bubble study is negative. Right Ventricle: The right ventricle is moderately enlarged. Not well seen. Right Atrium: The right atrium is mildly dilated. Aortic Valve: The aortic valve  is trileaflet. There is no evidence of aortic valve stenosis. There is no evidence of aortic valve regurgitation. Mitral Valve: The mitral valve is normal in structure. There is no evidence of mitral valve stenosis. There is no evidence of mitral valve regurgitation. Tricuspid Valve: The tricuspid valve is structurally normal. No evidence of tricuspid regurgitation. Pulmonic Valve: The pulmonic valve is structurally normal. There is no indication of pulmonic valve regurgitation. Pericardium: There is a trivial to small pericardial effusion. Triv poppy eff vs fat pad. Aorta: The aortic root is normal. Systemic Veins: The inferior vena cava appears to be of normal size.  CONCLUSIONS:  1. The left ventricular systolic function is normal, with a Alvares's biplane calculated ejection fraction of 70%.  2. Spectral Doppler shows an impaired relaxation pattern of left ventricular diastolic filling.  3. There is moderate concentric left ventricular hypertrophy.  4. Moderately enlarged right ventricle.  5. Triv poppy eff vs fat pad.  6. Aortic valve stenosis is not present.        CT angio chest for pulmonary embolism    Result Date: 6/23/2024  Interpreted By:  Doug Greenwood, STUDY: CT ANGIO CHEST FOR PULMONARY EMBOLISM;  6/23/2024 2:43 am   INDICATION: Signs/Symptoms:cp, tachycardia.   COMPARISON: None   ACCESSION NUMBER(S): [id number]   ORDERING CLINICIAN: PAIGE MORROW   TECHNIQUE: Helical data acquisition of the chest was obtained after intravenous administration of 75 ML Omnipaque 350, as per PE protocol. Images were reformatted in coronal and sagittal planes. Axial and coronal maximum intensity projection (MIP) images were created and reviewed.   FINDINGS: POTENTIAL LIMITATIONS OF THE STUDY: Body habitus limits sensitivity   HEART AND VESSELS: There are no discrete filling defects within main pulmonary artery and its branches to suggest acute pulmonary embolism. Main pulmonary artery and its branches are normal in caliber.    The thoracic aorta normal in course and caliber. No coronary artery calcifications are seen. Please note, the study is not optimized for evaluation of coronary arteries.   The cardiac chambers are not enlarged.   There is no pericardial effusion seen.   MEDIASTINUM AND UDAY, LOWER NECK AND AXILLA: The visualized thyroid gland is within normal limits. No evidence of thoracic lymphadenopathy by CT criteria. Small sliding hernia with a thick-walled midthoracic esophagus presumably reflects component of esophagitis. Direct visualization may be of diagnostic benefit   LUNGS AND AIRWAYS: No localizing infiltrate. Mild bibasilar atelectasis.   The bilateral lungs are clear without evidence of focal consolidation, pleural effusion, or pneumothorax.     Hepatic steatosis. Cholecystectomy changes.   CHEST WALL AND OSSEOUS STRUCTURES: Chest wall is within normal limits. No acute osseous pathology.There are no suspicious osseous lesions.       1. No evidence of acute pulmonary embolism. Body habitus limits sensitivity. Thick-walled esophagus in the midportion probably reflects a component of mild esophagitis.   Slight hepatic steatosis   MACRO: None   Signed by: Doug Greenwood 6/23/2024 3:31 AM Dictation workstation:   [id number]        === 06/23/24 ===    CT ANGIO CHEST FOR PULMONARY EMBOLISM    - Impression -  1. No evidence of acute pulmonary embolism. Body habitus limits  sensitivity. Thick-walled esophagus in the midportion probably  reflects a component of mild esophagitis.    Slight hepatic steatosis    MACRO:  None    Signed by: Doug Greenwood 6/23/2024 3:31 AM  Dictation workstation:   [id number]

## 2024-07-02 NOTE — PROGRESS NOTES
[facility] [facility]    ASSESSMENT and PLAN:       Macarena Adler is a 64 y.o. female with a significant past medical diabetes, HTN, chronic back pain, tobacco use, anxiety/depression, obesity (Body mass index is 40.23 kg/m².), and HLD who presents for follow up from a recent hospitalization.       GERD  Some symptoms consistent with GERD although other aspects of her symptoms as well as the acute onset would be atypical. She has some dysphagia and will need EGD for evaluation. This was ordered, but has not been scheduled. She prefers to have EGD done with Dr. Pickett who did her last colonoscopy. She has had some improvement with PPI.  - continue PPI  - EGD scheduled with Dr. Pickett  - hold Jardiance prior to procedure per anesthesia guidelines        Follow up with GI as needed.        Sanjay Pierre MD        Gastroenterology    [facility] [place] [facility]    Clinical   [facility]        Subjective   HISTORY OF PRESENT ILLNESS:     Chief Complaint  Hospital Follow-up    History Of Present Illness:    Macarena Adler is a 64 y.o. female with a significant past medical history of diabetes, HTN, chronic back pain, tobacco use, anxiety/depression, obesity (Body mass index is 40.23 kg/m².), and HLD who presents for follow up from a recent hospitalization.    she follows with (Adilson Kelly MD) as her PCP.     She was recently hospitalized (discharged 6/24/2024) after she had presented with chest pain. At that time she was seen by cardiology and they felt that her symptoms were unlikely cardiac in nature. She did undergo stress testing which was normal. While she was hospitalized she reported chest pain with a sharp/pressure sensation in the middle of her chest. This was worse with laying down as well as with movement and eating. She denied heartburn, but did report a globus sensation as well as dysphagia where solid food  "\"seems to go down slow\". No dysphagia with liquids. She denied any history of heartburn and she was not previously on any PPI. She has never had an EGD. I had recommended an outpatient EGD and she stated that she would prefer that this be done by Dr. Pickett as he had done her recent colonoscopy. I ordered that procedure, but that has not yet been scheduled.      Today she says that she has been doing alright since leaving the hospital. She has not had her EGD scheduled yet. She says that she has not had any further chest pain since leave the hospital. She feels like dysphagia has improved some as well. No nausea/vomiting. Heartburn has resolved.       Endoscopy History:  11/29/2023 - Colonoscopy: two transverse polyps (TA on path)           Review of systems:   Review of Systems   Constitutional:  Negative for chills, fatigue, fever and unexpected weight change.   HENT:  Negative for congestion, sneezing, sore throat, trouble swallowing and voice change.    Respiratory:  Negative for cough, shortness of breath and wheezing.    Cardiovascular:  Negative for chest pain, palpitations and leg swelling.   Gastrointestinal:         As detailed above.   Genitourinary:  Negative for dysuria and hematuria.   Musculoskeletal:  Negative for arthralgias and myalgias.   Skin:  Negative for pallor and rash.   Neurological:  Negative for dizziness, seizures, syncope, weakness, numbness and headaches.   Hematological:  Negative for adenopathy. Does not bruise/bleed easily.   Psychiatric/Behavioral:  Negative for confusion. The patient is not nervous/anxious.    All other systems reviewed and are negative.      I performed a complete 10 point review of systems and it is negative except as noted in HPI or above.      PAST HISTORIES:       Past Medical History:  She has a past medical history of Diabetes (Multi), Hyperlipidemia, Hypertension, Personal history of other diseases of the circulatory system, and Personal history of " "other endocrine, nutritional and metabolic disease.    Past Surgical History:  She has a past surgical history that includes Hysterectomy (08/17/2017).      Social History:  She reports that she has been smoking cigarettes. She started smoking about 38 years ago. She has a 39.3 pack-year smoking history. She has never used smokeless tobacco. She reports that she does not drink alcohol and does not use drugs.    Family History:  No known family history of GI disease, specifically denies any family history of pancreatitis, Crohn's, colon cancer, gastroesophageal cancer, or ulcerative colitis.    Family History   Problem Relation Name Age of Onset    Colon cancer Mother      No Known Problems Father      Colon cancer Other          Allergies:  Patient has no known allergies.      Objective   OBJECTIVE:       Last Recorded Vitals:  Vitals:    07/02/24 1349   BP: 134/74   Pulse: 72   SpO2: 96%   Weight: 135 kg (297 lb)   Height: 1.867 m (6' 1.5\")     /74   Pulse 72   Ht 1.867 m (6' 1.5\")   Wt 135 kg (297 lb)   SpO2 96%   BMI 38.65 kg/m²      Physical Exam:    Physical Exam  Vitals reviewed.   Constitutional:       General: She is not in acute distress.     Appearance: She is obese. She is not ill-appearing.   HENT:      Head: Normocephalic and atraumatic.   Eyes:      General: No scleral icterus.  Cardiovascular:      Rate and Rhythm: Normal rate and regular rhythm.      Pulses: Normal pulses.      Heart sounds: Normal heart sounds. No murmur heard.  Pulmonary:      Effort: Pulmonary effort is normal. No respiratory distress.      Breath sounds: Normal breath sounds. No wheezing.   Abdominal:      General: Bowel sounds are normal.      Palpations: Abdomen is soft.      Tenderness: There is no abdominal tenderness. There is no rebound.   Musculoskeletal:         General: No swelling or deformity.   Skin:     General: Skin is warm and dry.      Coloration: Skin is not jaundiced.      Findings: No rash. "   Neurological:      General: No focal deficit present.      Mental Status: She is alert and oriented to person, place, and time.   Psychiatric:         Mood and Affect: Mood normal.         Behavior: Behavior normal.         Thought Content: Thought content normal.         Judgment: Judgment normal.         Home Medications:  Prior to Admission medications    Medication Sig Start Date End Date Taking? Authorizing Provider   alcohol swabs (BD Alcohol Swabs) pads, medicated TESTING ONCE DAILY 1/22/24   Adilson Kelly MD   amLODIPine (Norvasc) 10 mg tablet Take 1 tablet (10 mg) by mouth once daily. 6/25/24   Adilson Kelly MD   cholecalciferol (Vitamin D-3) 25 MCG (1000 UT) capsule Take 1 capsule (25 mcg) by mouth once daily. 12/12/23   Adilson Kelly MD   ciclopirox (Penlac) 8 % solution Apply topically once daily at bedtime. 7/1/24   Loc Cordero DPM   empagliflozin (Jardiance) 25 mg Take 1 tablet (25 mg) by mouth once daily. 6/25/24   Adilson Kelly MD   pantoprazole (ProtoNix) 40 mg EC tablet Take 1 tablet (40 mg) by mouth 2 times a day before meals. Do not crush, chew, or split. 6/24/24 7/24/24  Lillie Rhodes PA-C   PARoxetine (Paxil) 10 mg tablet Take 1 tablet (10 mg) by mouth once daily at bedtime. 6/25/24   Adilson Kelly MD   pravastatin (Pravachol) 40 mg tablet Take 1 tablet (40 mg) by mouth once daily. 6/25/24   Adilson Kelly MD   True Metrix Glucose Test Strip strip USE 1 STRIP TWICE DAILY. 1/22/24   Adilson Kelly MD   Unilet Lancet 33 gauge misc USE AS DIRECTED ONCE DAILY 1/22/24   Adilson Kelly MD         Relevant Results Recent labs reviewed in the EMR.    Lab Results   Component Value Date/Time    WBC 8.8 06/24/2024 0415    HGB 12.5 06/24/2024 0415    HGB 13.4 06/23/2024 0015    HGB 13.3 11/15/2023 1336    MCV 87 06/24/2024 0415     06/24/2024 0415     06/23/2024 0015     11/15/2023 1336       Lab Results   Component Value Date/Time      "06/24/2024 0415    K 3.7 06/24/2024 0415     06/24/2024 0415    BUN 8 06/24/2024 0415    CREATININE 0.77 06/24/2024 0415    CREATININE 0.99 06/23/2024 0015       Lab Results   Component Value Date/Time    BILITOT 0.3 06/23/2024 0015    BILITOT 0.3 11/15/2023 1336    BILITOT 0.3 04/22/2022 1044    BILITOT 0.4 04/09/2021 1156    ALKPHOS 121 06/23/2024 0015    ALKPHOS 117 11/15/2023 1336    ALKPHOS 109 04/22/2022 1044    ALKPHOS 108 04/09/2021 1156    AST 12 06/23/2024 0015    AST 16 11/15/2023 1336    AST 16 04/22/2022 1044    AST 13 04/09/2021 1156    ALT 14 06/23/2024 0015    ALT 15 11/15/2023 1336    ALT 15 04/22/2022 1044    ALT 14 04/09/2021 1156    LIPASE 10 06/23/2024 0015       No results found for: \"CRP\", \"CALPS\"    Radiology: Imaging reviewed in the EMR.    Nuclear Stress Test    Result Date: 6/24/2024  Interpreted By:  Bridget Baez, STUDY: NUCLEAR STRESS TEST;  6/24/2024 11:15 am   INDICATION: Signs/Symptoms:Chest pain.   COMPARISON: None.   ACCESSION NUMBER(S): [id number]   ORDERING CLINICIAN: NOLBERTO GARCIA   TECHNIQUE: DIVISION OF NUCLEAR MEDICINE PHARMACOLOGIC STRESS MYOCARDIAL PERFUSION SCAN, ONE DAY PROTOCOL   The patient received an intravenous dose of  11.3 mCi of Tc-99m tetrofosmin and resting emission tomographic (SPECT) images of the myocardium were acquired. The patient then received an intravenous infusion of 0.4mg regadenoson (Lexiscan) followed by an additional dose of  33.3 mCi of Tc-99m tetrofosmin. Stress phase SPECT images of the myocardium were then acquired. These included ECG-gated images to assess and quantify ventricular function.   FINDINGS: Stress and rest images both demonstrate a normal distribution of perfusion throughout all LV segments with no sign of ischemia.   ECG-gated images demonstrate normal LV size and myocardial contractility with an LV ejection fraction of   73 % (normal above 50 percent).       1. Normal stress myocardial perfusion imaging in response to " pharmacologic stress. 2. Well-maintained left ventricular function.       Signed by: Bridget Baez 6/24/2024 3:05 PM Dictation workstation:   [id number]    Cardiology Interpretation Of Nuclear Stress - See Other Report For Nuclear Portion  Result Date: 6/24/2024  The patient developed no symptoms during the stress exam. The blood pressure response was normal.  Baseline ECG: Resting ECG showed Sinus Arrhythmia with T wave abnormality.Summary:  1. Correlate with myocardial perfusion imaging results.  2. No clinical or electrocardiographic evidence for ischemia at maximal infusion.  3. Nuclear image results are reported separately.       Transthoracic Echo (TTE) Complete  Result Date: 6/24/2024  Left Ventricle: The left ventricular systolic function is normal, with a Alvares's biplane calculated ejection fraction of 70%. There are no regional wall motion abnormalities. The left ventricular cavity size is normal. There is moderate concentric left ventricular hypertrophy. Spectral Doppler shows an impaired relaxation pattern of left ventricular diastolic filling. Left Atrium: The left atrium is normal in size. A bubble study using agitated saline was performed. Bubble study is negative. Right Ventricle: The right ventricle is moderately enlarged. Not well seen. Right Atrium: The right atrium is mildly dilated. Aortic Valve: The aortic valve is trileaflet. There is no evidence of aortic valve stenosis. There is no evidence of aortic valve regurgitation. Mitral Valve: The mitral valve is normal in structure. There is no evidence of mitral valve stenosis. There is no evidence of mitral valve regurgitation. Tricuspid Valve: The tricuspid valve is structurally normal. No evidence of tricuspid regurgitation. Pulmonic Valve: The pulmonic valve is structurally normal. There is no indication of pulmonic valve regurgitation. Pericardium: There is a trivial to small pericardial effusion. Triv poppy eff vs fat pad. Aorta: The  aortic root is normal. Systemic Veins: The inferior vena cava appears to be of normal size.  CONCLUSIONS:  1. The left ventricular systolic function is normal, with a Alvares's biplane calculated ejection fraction of 70%.  2. Spectral Doppler shows an impaired relaxation pattern of left ventricular diastolic filling.  3. There is moderate concentric left ventricular hypertrophy.  4. Moderately enlarged right ventricle.  5. Triv poppy eff vs fat pad.  6. Aortic valve stenosis is not present.        CT angio chest for pulmonary embolism    Result Date: 6/23/2024  Interpreted By:  Doug Greenwood, STUDY: CT ANGIO CHEST FOR PULMONARY EMBOLISM;  6/23/2024 2:43 am   INDICATION: Signs/Symptoms:cp, tachycardia.   COMPARISON: None   ACCESSION NUMBER(S): [id number]   ORDERING CLINICIAN: PAIGE MORROW   TECHNIQUE: Helical data acquisition of the chest was obtained after intravenous administration of 75 ML Omnipaque 350, as per PE protocol. Images were reformatted in coronal and sagittal planes. Axial and coronal maximum intensity projection (MIP) images were created and reviewed.   FINDINGS: POTENTIAL LIMITATIONS OF THE STUDY: Body habitus limits sensitivity   HEART AND VESSELS: There are no discrete filling defects within main pulmonary artery and its branches to suggest acute pulmonary embolism. Main pulmonary artery and its branches are normal in caliber.   The thoracic aorta normal in course and caliber. No coronary artery calcifications are seen. Please note, the study is not optimized for evaluation of coronary arteries.   The cardiac chambers are not enlarged.   There is no pericardial effusion seen.   MEDIASTINUM AND UDAY, LOWER NECK AND AXILLA: The visualized thyroid gland is within normal limits. No evidence of thoracic lymphadenopathy by CT criteria. Small sliding hernia with a thick-walled midthoracic esophagus presumably reflects component of esophagitis. Direct visualization may be of diagnostic benefit   LUNGS AND  AIRWAYS: No localizing infiltrate. Mild bibasilar atelectasis.   The bilateral lungs are clear without evidence of focal consolidation, pleural effusion, or pneumothorax.     Hepatic steatosis. Cholecystectomy changes.   CHEST WALL AND OSSEOUS STRUCTURES: Chest wall is within normal limits. No acute osseous pathology.There are no suspicious osseous lesions.       1. No evidence of acute pulmonary embolism. Body habitus limits sensitivity. Thick-walled esophagus in the midportion probably reflects a component of mild esophagitis.   Slight hepatic steatosis   MACRO: None   Signed by: Doug Greenwood 6/23/2024 3:31 AM Dictation workstation:   [id number]        === 06/23/24 ===    CT ANGIO CHEST FOR PULMONARY EMBOLISM    - Impression -  1. No evidence of acute pulmonary embolism. Body habitus limits  sensitivity. Thick-walled esophagus in the midportion probably  reflects a component of mild esophagitis.    Slight hepatic steatosis    MACRO:  None    Signed by: Doug Greenwood 6/23/2024 3:31 AM  Dictation workstation:   [id number]

## 2024-07-02 NOTE — PATIENT INSTRUCTIONS
You have been scheduled for an upper endoscopy (EGD).  You were given instructions for preparing for this test in the office today.  If you have questions about these instructions, please call my office at [phone].      You are currently prescribed one of the Sodium-glucose transport protein 2 (SGLT2) inhibitor medications such as Empagliflozin (Jardiance), Dapagliflozin (Farxiga), Canagliflozin (Invokana), Bexagliflozin (Brenzavvy), Ertugliflozin (Steglatro). These medications help to control diabetes, but they may put at increased risk for a type of diabetic ketoacidosis (DKA) if you are fasting for significant periods of time such as for a procedure. It is recommended that you stop taking this medication before your procedure to decrease your risk.    You should stop taking this medication for 3 days (4 days for Steglatro) prior to your procedure.    Since this is used to control diabetes, you should talk to your primary care provider (or whoever manages your diabetes medications) to make sure that they don't recommend any other changes to your medications around the time of your procedure. It is important to keep your diabetes controlled and to avoid any hypoglycemia (low blood sugar). Your primary care provider can help with that.       You were also given information regarding the schedule for your procedure including the time that you need to arrive to the endoscopy unit.  You will also be contacted 2-3 day prior to your procedure to confirm the final arrival time.  If you have questions about this or if you need to cancel or change this appointment please call my office at [phone].       Follow up with GI as needed.

## 2024-07-03 ENCOUNTER — PATIENT OUTREACH (OUTPATIENT)
Dept: CARE COORDINATION | Facility: CLINIC | Age: 64
End: 2024-07-03
Payer: COMMERCIAL

## 2024-07-03 ASSESSMENT — ENCOUNTER SYMPTOMS
COUGH: 0
SEIZURES: 0
ARTHRALGIAS: 0
BRUISES/BLEEDS EASILY: 0
HEADACHES: 0
DIZZINESS: 0
MYALGIAS: 0
UNEXPECTED WEIGHT CHANGE: 0
NERVOUS/ANXIOUS: 0
WHEEZING: 0
NUMBNESS: 0
DYSURIA: 0
SHORTNESS OF BREATH: 0
FEVER: 0
SORE THROAT: 0
HEMATURIA: 0
ADENOPATHY: 0
CONFUSION: 0
CHILLS: 0
TROUBLE SWALLOWING: 0
PALPITATIONS: 0
VOICE CHANGE: 0
FATIGUE: 0
ROS GI COMMENTS: AS DETAILED ABOVE.
WEAKNESS: 0

## 2024-07-03 NOTE — PROGRESS NOTES
Unable to reach patient for call back after patient's follow up appointment with PCP.   JESSEM with call back number for patient to call if needed   If no voicemail available call attempts x 2 were made to contact the patient to assist with any questions or concerns patient may have.

## 2024-07-17 ENCOUNTER — APPOINTMENT (OUTPATIENT)
Dept: PHARMACY | Facility: HOSPITAL | Age: 64
End: 2024-07-17
Payer: COMMERCIAL

## 2024-07-18 ENCOUNTER — HOSPITAL ENCOUNTER (OUTPATIENT)
Dept: GASTROENTEROLOGY | Facility: HOSPITAL | Age: 64
Discharge: HOME | End: 2024-07-18
Payer: COMMERCIAL

## 2024-07-18 DIAGNOSIS — R07.9 CHEST PAIN, UNSPECIFIED TYPE: ICD-10-CM

## 2024-07-18 DIAGNOSIS — R13.19 ESOPHAGEAL DYSPHAGIA: ICD-10-CM

## 2024-07-18 DIAGNOSIS — K21.9 GASTROESOPHAGEAL REFLUX DISEASE, UNSPECIFIED WHETHER ESOPHAGITIS PRESENT: ICD-10-CM

## 2024-07-18 RX ORDER — SODIUM CHLORIDE 9 MG/ML
20 INJECTION, SOLUTION INTRAVENOUS CONTINUOUS
Status: DISCONTINUED | OUTPATIENT
Start: 2024-07-18 | End: 2024-07-19 | Stop reason: HOSPADM

## 2024-07-25 NOTE — PROGRESS NOTES
"  Pharmacy Post-Discharge Visit    Patient is a 63 YO F who was referred to Clinical Pharmacy Team to complete a post-discharge medication optimization and monitoring visit.  The patient was referred for their DMT2.    Pt is here for First appointment.       Admission Date: 6/23/24  Discharge Date: 6/24/24    Referring Provider: Adilson Kelly MD(PCP)  Last Visit: 6/25/24  Next visit: 8/29/24        Subjective   HPI    DIABETES MELLITUS TYPE 2:    Diagnosed with diabetes: this year (2024). Known diabetic complications: none.  Does patient follow with Endocrinology: no  Last optometry exam: this month (7/2024) (diabetic eye exam was performed at most recent ophthalmology visit and was negative for retinopathy in both eyes)  Most recent visit in Podiatry: this month (7/2024)-- patient denies sores or cuts on feet today      Current diabetic medications include:  Jardiance 25mg PO daily      Adverse Effects: none    Glucose Readings:  Glucometer/CGM Type: patient uses a glucometer (cannot recall name) sporadically to check glucose levels    Current home BG readings: Per patient glucose level was 170 mg/dL \"a couple of days ago\"       Any episodes of hypoglycemia? No.  Did patient treat episode of hypoglycemia appropriately? N/A  Does the patient have a prescription for ready-to-use Glucagon? Not on insulin  Does pt have proteinuria? unknown-unable to be calculated   If appropriate, is patient on ACEi/ARB? N/A    Lifestyle:  Diet: 3 meals/day.   BK: eggs, bread, bruce, strawberries  LN: tuna sandwich, boiled eggs, salad  DN: meatloaf, cabbage, green beans  Snacks: peanut butter crackers, applesauce  Drinks: water    Physical Activity: walks outside twice daily    Secondary Prevention:  Statin? Yes-pravastatin 40mg PO daily  ACE-I/ARB? No  Aspirin? No    Pertinent PMH Review:  PMH of Pancreatitis: No  PMH of Retinopathy: No  PMH of Urinary Tract Infections: No  PMH of MTC: No  PMH of MENS2: No     Notable Medication " changes following discharge  Start: pantoprazole 40mg PO BID  Stop: none  Change: none    Medication Reconciliation  Start: none  Stop: none  Change: none    Drug Interactions  No relevant drug interactions were noted.    Medication System Management  Patients preferred pharmacy: [facility] ([address] [place], [place])  Adherence/Organization: patient reports compliance with meds  Affordability/Accessibility: patient denies having any issues affording and accessing meds      Objective   No Known Allergies  Social History     Social History Narrative    Not on file      Medication Review  Current Outpatient Medications   Medication Instructions    alcohol swabs (BD Alcohol Swabs) pads, medicated TESTING ONCE DAILY    amLODIPine (NORVASC) 10 mg, oral, Daily    cholecalciferol (VITAMIN D-3) 25 mcg, oral, Daily    ciclopirox (Penlac) 8 % solution Topical, Nightly    empagliflozin (JARDIANCE) 25 mg, oral, Daily    pantoprazole (PROTONIX) 40 mg, oral, 2 times daily before meals, Do not crush, chew, or split.    PARoxetine (PAXIL) 10 mg, oral, Nightly    pravastatin (PRAVACHOL) 40 mg, oral, Daily    True Metrix Glucose Test Strip strip 1 strip, 2 times daily    Unilet Lancet 33 gauge misc USE AS DIRECTED ONCE DAILY      Vitals  BP Readings from Last 2 Encounters:   07/02/24 134/74   07/01/24 113/67     BMI Readings from Last 1 Encounters:   07/02/24 38.65 kg/m²      Labs  A1C  Lab Results   Component Value Date    HGBA1C 6.8 (H) 04/04/2024    HGBA1C 6.5 (H) 11/15/2023    HGBA1C 6.2 (A) 01/20/2023     BMP  Lab Results   Component Value Date    CALCIUM 8.6 06/24/2024     06/24/2024    K 3.7 06/24/2024    CO2 25 06/24/2024     06/24/2024    BUN 8 06/24/2024    CREATININE 0.77 06/24/2024    EGFR 85 06/24/2024     LFTs  Lab Results   Component Value Date    ALT 14 06/23/2024    AST 12 06/23/2024    ALKPHOS 121 06/23/2024    BILITOT 0.3 06/23/2024     FLP  Lab Results   Component Value Date    TRIG 106  06/23/2024    CHOL 127 06/23/2024    LDLF 90 04/22/2022    LDLCALC 57 06/23/2024    HDL 49.3 06/23/2024     Urine Microalbumin  Lab Results   Component Value Date    MICROALBCREA  04/04/2024      Comment:      One or more analytes used in this calculation is outside of the analytical measurement range. Calculation cannot be performed.     Wt Readings from Last 3 Encounters:   07/02/24 135 kg (297 lb)   06/25/24 134 kg (296 lb)   06/24/24 138 kg (304 lb 14.3 oz)      There is no height or weight on file to calculate BMI.       Assessment/Plan   Problem List Items Addressed This Visit       Diabetes mellitus, type 2 (Multi)     Assessment:    Patient with DMT2 that is currently controlled based on most recent HgbA1C level. Patient only on Jardiance 25mg PO daily for glycemic control and is compliant with med and denies any ADRs to med.    Plan:    - Continue Jardiance 25mg PO daily  - Informed patient that she is not required to check glucose levels at home as she is not on insulin   - Instructed patient to check glucose level every day in the morning before eating or drinking anything if she decides she wants to check her glucose levels            Clinical Pharmacist follow-up: N/A, follow-up with clinical pharmacy not necessary at this time.    Continue all meds under the continuation of care with the referring provider and clinical pharmacy team.    Thank you,  Glenroy Murray, PharmD  Clinical Pharmacist  [phone] (Phone Number)    Verbal consent to manage patient's drug therapy was obtained from the patient. They were informed they may decline to participate or withdraw from participation in pharmacy services at any time.

## 2024-07-26 ENCOUNTER — TELEMEDICINE (OUTPATIENT)
Dept: PHARMACY | Facility: HOSPITAL | Age: 64
End: 2024-07-26
Payer: COMMERCIAL

## 2024-07-26 DIAGNOSIS — E11.9 TYPE 2 DIABETES MELLITUS WITHOUT COMPLICATION, WITHOUT LONG-TERM CURRENT USE OF INSULIN (MULTI): ICD-10-CM

## 2024-07-26 NOTE — ASSESSMENT & PLAN NOTE
Assessment:    Patient with DMT2 that is currently controlled based on most recent HgbA1C level. Patient only on Jardiance 25mg PO daily for glycemic control and is compliant with med and denies any ADRs to med.    Plan:    - Continue Jardiance 25mg PO daily  - Informed patient that she is not required to check glucose levels at home as she is not on insulin   - Instructed patient to check glucose level every day in the morning before eating or drinking anything if she decides she wants to check her glucose levels

## 2024-07-29 ENCOUNTER — LAB (OUTPATIENT)
Dept: LAB | Facility: LAB | Age: 64
End: 2024-07-29
Payer: COMMERCIAL

## 2024-07-29 ENCOUNTER — OFFICE VISIT (OUTPATIENT)
Dept: PRIMARY CARE | Facility: CLINIC | Age: 64
End: 2024-07-29
Payer: COMMERCIAL

## 2024-07-29 VITALS
OXYGEN SATURATION: 95 % | HEIGHT: 72 IN | DIASTOLIC BLOOD PRESSURE: 83 MMHG | RESPIRATION RATE: 16 BRPM | WEIGHT: 293 LBS | HEART RATE: 86 BPM | TEMPERATURE: 96.9 F | SYSTOLIC BLOOD PRESSURE: 133 MMHG | BODY MASS INDEX: 39.68 KG/M2

## 2024-07-29 DIAGNOSIS — I10 BENIGN ESSENTIAL HYPERTENSION: ICD-10-CM

## 2024-07-29 DIAGNOSIS — E55.9 VITAMIN D DEFICIENCY: ICD-10-CM

## 2024-07-29 DIAGNOSIS — E11.9 TYPE 2 DIABETES MELLITUS WITHOUT COMPLICATION, WITHOUT LONG-TERM CURRENT USE OF INSULIN (MULTI): ICD-10-CM

## 2024-07-29 DIAGNOSIS — K21.00 GASTROESOPHAGEAL REFLUX DISEASE WITH ESOPHAGITIS WITHOUT HEMORRHAGE: ICD-10-CM

## 2024-07-29 DIAGNOSIS — I10 BENIGN ESSENTIAL HYPERTENSION: Primary | ICD-10-CM

## 2024-07-29 LAB
ALBUMIN SERPL BCP-MCNC: 4 G/DL (ref 3.4–5)
ALP SERPL-CCNC: 108 U/L (ref 33–136)
ALT SERPL W P-5'-P-CCNC: 16 U/L (ref 7–45)
ANION GAP SERPL CALC-SCNC: 11 MMOL/L (ref 10–20)
AST SERPL W P-5'-P-CCNC: 17 U/L (ref 9–39)
BILIRUB SERPL-MCNC: 0.3 MG/DL (ref 0–1.2)
BUN SERPL-MCNC: 12 MG/DL (ref 6–23)
CALCIUM SERPL-MCNC: 9 MG/DL (ref 8.6–10.3)
CHLORIDE SERPL-SCNC: 108 MMOL/L (ref 98–107)
CO2 SERPL-SCNC: 25 MMOL/L (ref 21–32)
CREAT SERPL-MCNC: 0.86 MG/DL (ref 0.5–1.05)
EGFRCR SERPLBLD CKD-EPI 2021: 75 ML/MIN/1.73M*2
GLUCOSE SERPL-MCNC: 161 MG/DL (ref 74–99)
MAGNESIUM SERPL-MCNC: 2.05 MG/DL (ref 1.6–2.4)
POTASSIUM SERPL-SCNC: 3.3 MMOL/L (ref 3.5–5.3)
PROT SERPL-MCNC: 6.7 G/DL (ref 6.4–8.2)
SODIUM SERPL-SCNC: 141 MMOL/L (ref 136–145)

## 2024-07-29 PROCEDURE — 3048F LDL-C <100 MG/DL: CPT | Performed by: STUDENT IN AN ORGANIZED HEALTH CARE EDUCATION/TRAINING PROGRAM

## 2024-07-29 PROCEDURE — 3044F HG A1C LEVEL LT 7.0%: CPT | Performed by: STUDENT IN AN ORGANIZED HEALTH CARE EDUCATION/TRAINING PROGRAM

## 2024-07-29 PROCEDURE — 3008F BODY MASS INDEX DOCD: CPT | Performed by: STUDENT IN AN ORGANIZED HEALTH CARE EDUCATION/TRAINING PROGRAM

## 2024-07-29 PROCEDURE — 99213 OFFICE O/P EST LOW 20 MIN: CPT | Performed by: STUDENT IN AN ORGANIZED HEALTH CARE EDUCATION/TRAINING PROGRAM

## 2024-07-29 PROCEDURE — 83036 HEMOGLOBIN GLYCOSYLATED A1C: CPT

## 2024-07-29 PROCEDURE — 1159F MED LIST DOCD IN RCRD: CPT | Performed by: STUDENT IN AN ORGANIZED HEALTH CARE EDUCATION/TRAINING PROGRAM

## 2024-07-29 PROCEDURE — 3075F SYST BP GE 130 - 139MM HG: CPT | Performed by: STUDENT IN AN ORGANIZED HEALTH CARE EDUCATION/TRAINING PROGRAM

## 2024-07-29 PROCEDURE — 80053 COMPREHEN METABOLIC PANEL: CPT

## 2024-07-29 PROCEDURE — 3079F DIAST BP 80-89 MM HG: CPT | Performed by: STUDENT IN AN ORGANIZED HEALTH CARE EDUCATION/TRAINING PROGRAM

## 2024-07-29 PROCEDURE — 83735 ASSAY OF MAGNESIUM: CPT

## 2024-07-29 PROCEDURE — 1123F ACP DISCUSS/DSCN MKR DOCD: CPT | Performed by: STUDENT IN AN ORGANIZED HEALTH CARE EDUCATION/TRAINING PROGRAM

## 2024-07-29 PROCEDURE — 1125F AMNT PAIN NOTED PAIN PRSNT: CPT | Performed by: STUDENT IN AN ORGANIZED HEALTH CARE EDUCATION/TRAINING PROGRAM

## 2024-07-29 PROCEDURE — 3062F POS MACROALBUMINURIA REV: CPT | Performed by: STUDENT IN AN ORGANIZED HEALTH CARE EDUCATION/TRAINING PROGRAM

## 2024-07-29 PROCEDURE — 1160F RVW MEDS BY RX/DR IN RCRD: CPT | Performed by: STUDENT IN AN ORGANIZED HEALTH CARE EDUCATION/TRAINING PROGRAM

## 2024-07-29 PROCEDURE — 36415 COLL VENOUS BLD VENIPUNCTURE: CPT

## 2024-07-29 RX ORDER — VIT C/E/ZN/COPPR/LUTEIN/ZEAXAN 250MG-90MG
25 CAPSULE ORAL DAILY
Qty: 90 CAPSULE | Refills: 1 | Status: SHIPPED | OUTPATIENT
Start: 2024-07-29

## 2024-07-29 RX ORDER — PANTOPRAZOLE SODIUM 40 MG/1
40 TABLET, DELAYED RELEASE ORAL
Qty: 30 TABLET | Refills: 2 | Status: SHIPPED | OUTPATIENT
Start: 2024-07-29 | End: 2024-10-27

## 2024-07-29 SDOH — ECONOMIC STABILITY: FOOD INSECURITY: WITHIN THE PAST 12 MONTHS, YOU WORRIED THAT YOUR FOOD WOULD RUN OUT BEFORE YOU GOT MONEY TO BUY MORE.: NEVER TRUE

## 2024-07-29 SDOH — ECONOMIC STABILITY: FOOD INSECURITY: WITHIN THE PAST 12 MONTHS, THE FOOD YOU BOUGHT JUST DIDN'T LAST AND YOU DIDN'T HAVE MONEY TO GET MORE.: NEVER TRUE

## 2024-07-29 ASSESSMENT — ENCOUNTER SYMPTOMS
COLOR CHANGE: 0
CONFUSION: 0
ABDOMINAL PAIN: 0
VOMITING: 0
DIZZINESS: 0
CHILLS: 0
NAUSEA: 0
SHORTNESS OF BREATH: 0
COUGH: 0
FATIGUE: 0
MUSCULOSKELETAL NEGATIVE: 1
HEADACHES: 0
PALPITATIONS: 0
FEVER: 0
UNEXPECTED WEIGHT CHANGE: 0
CONSTIPATION: 0
DIARRHEA: 0
WHEEZING: 0

## 2024-07-29 ASSESSMENT — PATIENT HEALTH QUESTIONNAIRE - PHQ9
2. FEELING DOWN, DEPRESSED OR HOPELESS: NOT AT ALL
1. LITTLE INTEREST OR PLEASURE IN DOING THINGS: NOT AT ALL
SUM OF ALL RESPONSES TO PHQ9 QUESTIONS 1 & 2: 0

## 2024-07-29 ASSESSMENT — LIFESTYLE VARIABLES
AUDIT-C TOTAL SCORE: 0
HOW MANY STANDARD DRINKS CONTAINING ALCOHOL DO YOU HAVE ON A TYPICAL DAY: PATIENT DOES NOT DRINK
SKIP TO QUESTIONS 9-10: 1
HOW OFTEN DO YOU HAVE A DRINK CONTAINING ALCOHOL: NEVER
HOW OFTEN DO YOU HAVE SIX OR MORE DRINKS ON ONE OCCASION: NEVER

## 2024-07-29 ASSESSMENT — PAIN SCALES - GENERAL: PAINLEVEL: 2

## 2024-07-29 NOTE — PROGRESS NOTES
"Subjective   Patient ID: Macarena Adler is a 64 y.o. female who presents for Follow-up.    HPI   She is here for FU visit. Reports she is doing okay, recently went to see Optho who rec for a yearly exam; also saw podiatrist  recently.   She would like to get tested for kidney, to make sure they are fine being on multiple meds. Reports she was put on protonix during ER visit, helping with her reflux, would like to cont, req refills. Also she is following with GI and going for EGD soon.  Also req refills on her vitamins. Pt wasn't able to do the echo as ordered at [facility], planning to do soon.     Review of Systems   Constitutional:  Negative for chills, fatigue, fever and unexpected weight change.   HENT: Negative.     Respiratory:  Negative for cough, shortness of breath and wheezing.    Cardiovascular:  Negative for chest pain, palpitations and leg swelling.   Gastrointestinal:  Negative for abdominal pain, constipation, diarrhea, nausea and vomiting.   Musculoskeletal: Negative.    Skin:  Negative for color change and rash.   Neurological:  Negative for dizziness and headaches.   Psychiatric/Behavioral:  Negative for behavioral problems and confusion.        Objective   /83 (BP Location: Right arm, Patient Position: Sitting, BP Cuff Size: Large adult)   Pulse 86   Temp 36.1 °C (96.9 °F) (Temporal)   Resp 16   Ht 1.867 m (6' 1.5\")   Wt 135 kg (298 lb)   SpO2 95%   BMI 38.78 kg/m²     Physical Exam  Vitals and nursing note reviewed.   Constitutional:       Appearance: Normal appearance. She is obese.   Cardiovascular:      Rate and Rhythm: Normal rate and regular rhythm.      Pulses: Normal pulses.      Heart sounds: Normal heart sounds.   Pulmonary:      Effort: Pulmonary effort is normal.      Breath sounds: Normal breath sounds.   Abdominal:      General: Abdomen is flat. Bowel sounds are normal.      Palpations: Abdomen is soft.   Musculoskeletal:         General: Normal range of motion.   Neurological: "      General: No focal deficit present.      Mental Status: She is alert.   Psychiatric:         Mood and Affect: Mood normal.         Behavior: Behavior normal.       Assessment/Plan   She is here for FU visit. Overall doing okay, no major concerns today and is clinically & vitally stable. Reflux stable, cont PPI as below; cont following with GI as schd. Other chronic problems stable; cont same. Other Plan as follows      Problem List Items Addressed This Visit             ICD-10-CM    Benign essential hypertension - Primary I10    Relevant Orders    Comprehensive Metabolic Panel    Magnesium    Diabetes mellitus, type 2 (Multi) E11.9    Relevant Orders    Hemoglobin A1c    Vitamin D deficiency E55.9    Relevant Medications    cholecalciferol (Vitamin D-3) 25 MCG (1000 UT) capsule    GERD (gastroesophageal reflux disease) K21.9    Relevant Medications    pantoprazole (ProtoNix) 40 mg EC tablet     Rtc 2-3 mo for FU    Adilson Kelly MD    [facility]

## 2024-07-30 LAB
EST. AVERAGE GLUCOSE BLD GHB EST-MCNC: 143 MG/DL
HBA1C MFR BLD: 6.6 %

## 2024-08-08 ENCOUNTER — ANESTHESIA EVENT (OUTPATIENT)
Dept: GASTROENTEROLOGY | Facility: HOSPITAL | Age: 64
End: 2024-08-08
Payer: COMMERCIAL

## 2024-08-08 ENCOUNTER — ANESTHESIA (OUTPATIENT)
Dept: GASTROENTEROLOGY | Facility: HOSPITAL | Age: 64
End: 2024-08-08
Payer: COMMERCIAL

## 2024-08-08 ENCOUNTER — HOSPITAL ENCOUNTER (OUTPATIENT)
Dept: GASTROENTEROLOGY | Facility: HOSPITAL | Age: 64
Discharge: HOME | End: 2024-08-08
Payer: COMMERCIAL

## 2024-08-08 VITALS
SYSTOLIC BLOOD PRESSURE: 124 MMHG | BODY MASS INDEX: 39.68 KG/M2 | DIASTOLIC BLOOD PRESSURE: 76 MMHG | OXYGEN SATURATION: 98 % | RESPIRATION RATE: 19 BRPM | HEIGHT: 72 IN | WEIGHT: 293 LBS | TEMPERATURE: 97.1 F | HEART RATE: 57 BPM

## 2024-08-08 DIAGNOSIS — R07.9 CHEST PAIN, UNSPECIFIED TYPE: ICD-10-CM

## 2024-08-08 DIAGNOSIS — R13.19 ESOPHAGEAL DYSPHAGIA: ICD-10-CM

## 2024-08-08 DIAGNOSIS — K21.00 GASTROESOPHAGEAL REFLUX DISEASE WITH ESOPHAGITIS WITHOUT HEMORRHAGE: ICD-10-CM

## 2024-08-08 DIAGNOSIS — K21.9 GASTROESOPHAGEAL REFLUX DISEASE WITHOUT ESOPHAGITIS: ICD-10-CM

## 2024-08-08 PROCEDURE — 2500000004 HC RX 250 GENERAL PHARMACY W/ HCPCS (ALT 636 FOR OP/ED): Performed by: NURSE ANESTHETIST, CERTIFIED REGISTERED

## 2024-08-08 PROCEDURE — 3700000002 HC GENERAL ANESTHESIA TIME - EACH INCREMENTAL 1 MINUTE

## 2024-08-08 PROCEDURE — 2500000005 HC RX 250 GENERAL PHARMACY W/O HCPCS: Performed by: NURSE ANESTHETIST, CERTIFIED REGISTERED

## 2024-08-08 PROCEDURE — 7100000009 HC PHASE TWO TIME - INITIAL BASE CHARGE

## 2024-08-08 PROCEDURE — 2500000004 HC RX 250 GENERAL PHARMACY W/ HCPCS (ALT 636 FOR OP/ED): Performed by: INTERNAL MEDICINE

## 2024-08-08 PROCEDURE — 7100000010 HC PHASE TWO TIME - EACH INCREMENTAL 1 MINUTE

## 2024-08-08 PROCEDURE — 3700000001 HC GENERAL ANESTHESIA TIME - INITIAL BASE CHARGE

## 2024-08-08 PROCEDURE — 43239 EGD BIOPSY SINGLE/MULTIPLE: CPT | Performed by: INTERNAL MEDICINE

## 2024-08-08 RX ORDER — LIDOCAINE HYDROCHLORIDE 20 MG/ML
INJECTION, SOLUTION INFILTRATION; PERINEURAL AS NEEDED
Status: DISCONTINUED | OUTPATIENT
Start: 2024-08-08 | End: 2024-08-08

## 2024-08-08 RX ORDER — SODIUM CHLORIDE 9 MG/ML
20 INJECTION, SOLUTION INTRAVENOUS CONTINUOUS
Status: DISCONTINUED | OUTPATIENT
Start: 2024-08-08 | End: 2024-08-09 | Stop reason: HOSPADM

## 2024-08-08 RX ORDER — PANTOPRAZOLE SODIUM 40 MG/1
40 TABLET, DELAYED RELEASE ORAL 2 TIMES DAILY
Qty: 60 TABLET | Refills: 1 | Status: SHIPPED | OUTPATIENT
Start: 2024-08-08 | End: 2024-10-07

## 2024-08-08 RX ORDER — PROPOFOL 10 MG/ML
INJECTION, EMULSION INTRAVENOUS AS NEEDED
Status: DISCONTINUED | OUTPATIENT
Start: 2024-08-08 | End: 2024-08-08

## 2024-08-08 RX ORDER — FENTANYL CITRATE 50 UG/ML
INJECTION, SOLUTION INTRAMUSCULAR; INTRAVENOUS AS NEEDED
Status: DISCONTINUED | OUTPATIENT
Start: 2024-08-08 | End: 2024-08-08

## 2024-08-08 SDOH — HEALTH STABILITY: MENTAL HEALTH: CURRENT SMOKER: 1

## 2024-08-08 ASSESSMENT — PAIN SCALES - GENERAL
PAINLEVEL_OUTOF10: 0 - NO PAIN
PAIN_LEVEL: 0

## 2024-08-08 ASSESSMENT — PAIN - FUNCTIONAL ASSESSMENT
PAIN_FUNCTIONAL_ASSESSMENT: 0-10

## 2024-08-08 NOTE — ANESTHESIA PREPROCEDURE EVALUATION
Patient: Macarena Adler    Procedure Information       Date/Time: 08/08/24 1000    Scheduled providers: Ollie Pickett DO    Procedure: EGD    Location:  [facility]            Relevant Problems   Anesthesia (within normal limits)      Cardiac   (+) Benign essential hypertension   (+) Chest pain   (+) Hyperlipidemia, mixed   (+) Rib pain      Neuro   (+) Carpal tunnel syndrome of left wrist      GI   (+) Dysphagia   (+) GERD (gastroesophageal reflux disease)   (+) Rectal bleeding      Endocrine   (+) Diabetes mellitus, type 2 (Multi)   (+) Obesity, unspecified      Musculoskeletal   (+) Carpal tunnel syndrome of left wrist   (+) Chronic pain syndrome      ID   (+) Candidal dermatitis   (+) Candidiasis of skin and nail       Clinical information reviewed:    Allergies  Meds               NPO Detail:  NPO/Void Status  Date of Last Liquid: 08/08/24  Time of Last Liquid: 0700  Date of Last Solid: 08/07/24  Time of Last Solid: 1800         Physical Exam    Airway  Mallampati: III  TM distance: >3 FB  Neck ROM: full     Cardiovascular - normal exam     Dental        Pulmonary - normal exam     Abdominal   (+) obese             Anesthesia Plan    History of general anesthesia?: yes  History of complications of general anesthesia?: no    ASA 2     MAC     The patient is a current smoker.  Patient was previously instructed to abstain from smoking on day of procedure.  Patient did not smoke on day of procedure.    intravenous induction   Anesthetic plan and risks discussed with patient.    Plan discussed with CRNA.

## 2024-08-08 NOTE — H&P
History Of Present Illness  Macarena Adler is a 64 y.o. female presenting for EGD dysphagia and GERD.     Past Medical History  Past Medical History:   Diagnosis Date    Diabetes (Multi)     Hyperlipidemia     Hypertension     Personal history of other diseases of the circulatory system     History of hypertension    Personal history of other endocrine, nutritional and metabolic disease     History of hyperlipidemia       Surgical History  Past Surgical History:   Procedure Laterality Date    HYSTERECTOMY  08/17/2017    Hysterectomy        Social History  She reports that she has been smoking cigarettes. She started smoking about 38 years ago. She has a 39.3 pack-year smoking history. She has never used smokeless tobacco. She reports that she does not drink alcohol and does not use drugs.    Family History  Family History   Problem Relation Name Age of Onset    Colon cancer Mother      No Known Problems Father      Colon cancer Other          Allergies  Patient has no known allergies.    Review of Systems     Physical Exam  Vitals reviewed.   Constitutional:       General: She is awake.      Appearance: Normal appearance.   HENT:      Head: Normocephalic.      Mouth/Throat:      Mouth: Mucous membranes are moist.   Eyes:      Extraocular Movements: Extraocular movements intact.   Cardiovascular:      Rate and Rhythm: Normal rate.      Heart sounds: Normal heart sounds.   Pulmonary:      Effort: Pulmonary effort is normal.      Breath sounds: Normal breath sounds.   Abdominal:      General: Bowel sounds are normal.      Palpations: Abdomen is soft.      Tenderness: There is no abdominal tenderness. There is no guarding or rebound.      Hernia: No hernia is present.   Musculoskeletal:         General: Normal range of motion.      Cervical back: Neck supple.   Skin:     General: Skin is warm and dry.   Neurological:      General: No focal deficit present.      Mental Status: She is alert.   Psychiatric:          Attention and Perception: Attention and perception normal.         Mood and Affect: Mood normal.         Behavior: Behavior normal.          Last Recorded Vitals  There were no vitals taken for this visit.    Relevant Results        Current Outpatient Medications   Medication Instructions    alcohol swabs (BD Alcohol Swabs) pads, medicated TESTING ONCE DAILY    amLODIPine (NORVASC) 10 mg, oral, Daily    cholecalciferol (VITAMIN D-3) 25 mcg, oral, Daily    ciclopirox (Penlac) 8 % solution Topical, Nightly    empagliflozin (JARDIANCE) 25 mg, oral, Daily    pantoprazole (PROTONIX) 40 mg, oral, Daily before breakfast, Do not crush, chew, or split.    PARoxetine (PAXIL) 10 mg, oral, Nightly    pravastatin (PRAVACHOL) 40 mg, oral, Daily    True Metrix Glucose Test Strip strip 1 strip, 2 times daily    Unilet Lancet 33 gauge misc USE AS DIRECTED ONCE DAILY          Assessment/Plan   Active Problems:  There are no active Hospital Problems.        EGD for adonay Pickett, DO

## 2024-08-08 NOTE — ANESTHESIA POSTPROCEDURE EVALUATION
Patient: Macarena Adler    Procedure Summary       Date: 08/08/24 Room / Location: [facility]    Anesthesia Start: 0936 Anesthesia Stop: 0953    Procedure: EGD Diagnosis:       Chest pain, unspecified type      Gastroesophageal reflux disease without esophagitis      Esophageal dysphagia    Scheduled Providers: Ollie Pickett DO Responsible Provider: SINGH Cuadra    Anesthesia Type: MAC ASA Status: 2            Anesthesia Type: MAC    Vitals Value Taken Time   /82 08/08/24 0954   Temp 97.2 08/08/24 0954   Pulse 63 08/08/24 0954   Resp 17 08/08/24 0954   SpO2 100% 08/08/24 0954       Anesthesia Post Evaluation    Patient location during evaluation: PACU  Patient participation: complete - patient participated  Level of consciousness: awake and alert  Pain score: 0  Pain management: adequate  Airway patency: patent  Cardiovascular status: acceptable and hemodynamically stable  Respiratory status: acceptable, spontaneous ventilation and face mask  Hydration status: acceptable  Postoperative Nausea and Vomiting: none        There were no known notable events for this encounter.

## 2024-08-13 LAB
LAB AP ASR DISCLAIMER: NORMAL
LABORATORY COMMENT REPORT: NORMAL
PATH REPORT.FINAL DX SPEC: NORMAL
PATH REPORT.GROSS SPEC: NORMAL
PATH REPORT.RELEVANT HX SPEC: NORMAL
PATH REPORT.TOTAL CANCER: NORMAL

## 2024-08-14 ENCOUNTER — PATIENT OUTREACH (OUTPATIENT)
Dept: CARE COORDINATION | Facility: CLINIC | Age: 64
End: 2024-08-14
Payer: COMMERCIAL

## 2024-08-18 DIAGNOSIS — E11.9 TYPE 2 DIABETES MELLITUS WITHOUT COMPLICATION, WITHOUT LONG-TERM CURRENT USE OF INSULIN (MULTI): ICD-10-CM

## 2024-08-18 DIAGNOSIS — E78.2 HYPERLIPIDEMIA, MIXED: ICD-10-CM

## 2024-08-18 DIAGNOSIS — N95.1 HOT FLASHES DUE TO MENOPAUSE: ICD-10-CM

## 2024-08-20 DIAGNOSIS — E87.6 HYPOKALEMIA: Primary | ICD-10-CM

## 2024-08-20 RX ORDER — EMPAGLIFLOZIN 25 MG/1
25 TABLET, FILM COATED ORAL DAILY
Qty: 30 TABLET | Refills: 5 | Status: SHIPPED | OUTPATIENT
Start: 2024-08-20

## 2024-08-20 RX ORDER — PRAVASTATIN SODIUM 40 MG/1
40 TABLET ORAL DAILY
Qty: 30 TABLET | Refills: 5 | Status: SHIPPED | OUTPATIENT
Start: 2024-08-20

## 2024-08-20 RX ORDER — PAROXETINE 10 MG/1
10 TABLET, FILM COATED ORAL NIGHTLY
Qty: 30 TABLET | Refills: 5 | Status: SHIPPED | OUTPATIENT
Start: 2024-08-20

## 2024-08-29 ENCOUNTER — LAB (OUTPATIENT)
Dept: LAB | Facility: LAB | Age: 64
End: 2024-08-29
Payer: COMMERCIAL

## 2024-08-29 ENCOUNTER — APPOINTMENT (OUTPATIENT)
Dept: PRIMARY CARE | Facility: CLINIC | Age: 64
End: 2024-08-29
Payer: COMMERCIAL

## 2024-08-29 DIAGNOSIS — E87.6 HYPOKALEMIA: ICD-10-CM

## 2024-08-29 LAB
ANION GAP SERPL CALC-SCNC: 13 MMOL/L (ref 10–20)
BUN SERPL-MCNC: 11 MG/DL (ref 6–23)
CALCIUM SERPL-MCNC: 8.8 MG/DL (ref 8.6–10.3)
CHLORIDE SERPL-SCNC: 107 MMOL/L (ref 98–107)
CO2 SERPL-SCNC: 25 MMOL/L (ref 21–32)
CREAT SERPL-MCNC: 0.86 MG/DL (ref 0.5–1.05)
EGFRCR SERPLBLD CKD-EPI 2021: 75 ML/MIN/1.73M*2
GLUCOSE SERPL-MCNC: 110 MG/DL (ref 74–99)
MAGNESIUM SERPL-MCNC: 2.05 MG/DL (ref 1.6–2.4)
POTASSIUM SERPL-SCNC: 4 MMOL/L (ref 3.5–5.3)
SODIUM SERPL-SCNC: 141 MMOL/L (ref 136–145)

## 2024-08-29 PROCEDURE — 80048 BASIC METABOLIC PNL TOTAL CA: CPT

## 2024-08-29 PROCEDURE — 36415 COLL VENOUS BLD VENIPUNCTURE: CPT

## 2024-08-29 PROCEDURE — 83735 ASSAY OF MAGNESIUM: CPT

## 2024-09-11 DIAGNOSIS — E11.9 TYPE 2 DIABETES MELLITUS WITHOUT COMPLICATIONS (MULTI): ICD-10-CM

## 2024-09-11 RX ORDER — CALCIUM CITRATE/VITAMIN D3 200MG-6.25
1 TABLET ORAL 2 TIMES DAILY
Qty: 100 STRIP | Refills: 2 | Status: SHIPPED | OUTPATIENT
Start: 2024-09-11

## 2024-10-07 ENCOUNTER — APPOINTMENT (OUTPATIENT)
Dept: PODIATRY | Facility: CLINIC | Age: 64
End: 2024-10-07
Payer: COMMERCIAL

## 2024-10-07 DIAGNOSIS — M79.671 RIGHT FOOT PAIN: ICD-10-CM

## 2024-10-07 DIAGNOSIS — M19.072 PRIMARY OSTEOARTHRITIS OF BOTH FEET: Primary | ICD-10-CM

## 2024-10-07 DIAGNOSIS — M19.071 PRIMARY OSTEOARTHRITIS OF BOTH FEET: Primary | ICD-10-CM

## 2024-10-07 DIAGNOSIS — E11.9 DIABETES MELLITUS WITHOUT COMPLICATION (MULTI): ICD-10-CM

## 2024-10-07 DIAGNOSIS — M79.672 LEFT FOOT PAIN: ICD-10-CM

## 2024-10-07 PROCEDURE — 3062F POS MACROALBUMINURIA REV: CPT | Performed by: PODIATRIST

## 2024-10-07 PROCEDURE — 3044F HG A1C LEVEL LT 7.0%: CPT | Performed by: PODIATRIST

## 2024-10-07 PROCEDURE — 1123F ACP DISCUSS/DSCN MKR DOCD: CPT | Performed by: PODIATRIST

## 2024-10-07 PROCEDURE — 1159F MED LIST DOCD IN RCRD: CPT | Performed by: PODIATRIST

## 2024-10-07 PROCEDURE — 99212 OFFICE O/P EST SF 10 MIN: CPT | Performed by: PODIATRIST

## 2024-10-07 PROCEDURE — 3048F LDL-C <100 MG/DL: CPT | Performed by: PODIATRIST

## 2024-10-07 NOTE — PROGRESS NOTES
CC:  diabetic foot care.      HPI: Pt presents for dm foot exam and pain to the toes and top of the feet, no injury, does not check her sugars.        PCP: Dr. Kelly  Last visit: 7-29-24     PMH  Medical History        Past Medical History:   Diagnosis Date    Diabetes (Multi)      Hyperlipidemia      Hypertension      Personal history of other diseases of the circulatory system       History of hypertension    Personal history of other endocrine, nutritional and metabolic disease       History of hyperlipidemia         MEDS    Current Medications      Current Outpatient Medications:     alcohol swabs (BD Alcohol Swabs) pads, medicated, TESTING ONCE DAILY, Disp: 100 each, Rfl: 2    amLODIPine (Norvasc) 10 mg tablet, Take 1 tablet (10 mg) by mouth once daily., Disp: 90 tablet, Rfl: 1    cholecalciferol (Vitamin D-3) 25 MCG (1000 UT) capsule, Take 1 capsule (25 mcg) by mouth once daily., Disp: 90 capsule, Rfl: 1    empagliflozin (Jardiance) 25 mg, Take 1 tablet (25 mg) by mouth once daily., Disp: 90 tablet, Rfl: 1    pantoprazole (ProtoNix) 40 mg EC tablet, Take 1 tablet (40 mg) by mouth 2 times a day before meals. Do not crush, chew, or split., Disp: 60 tablet, Rfl: 0    PARoxetine (Paxil) 10 mg tablet, Take 1 tablet (10 mg) by mouth once daily at bedtime., Disp: 90 tablet, Rfl: 1    pravastatin (Pravachol) 40 mg tablet, Take 1 tablet (40 mg) by mouth once daily., Disp: 90 tablet, Rfl: 1    True Metrix Glucose Test Strip strip, USE 1 STRIP TWICE DAILY., Disp: 100 strip, Rfl: 2    Unilet Lancet 33 gauge misc, USE AS DIRECTED ONCE DAILY, Disp: 100 each, Rfl: 2     Allergies  Allergies   No Known Allergies     Social History   Social History            Socioeconomic History    Marital status: Single       Spouse name: None    Number of children: None    Years of education: None    Highest education level: None   Occupational History    None   Tobacco Use    Smoking status: Every Day       Current packs/day: 0.50        Average packs/day: 0.7 packs/day for 58.5 years (39.2 ttl pk-yrs)       Types: Cigarettes       Start date: 1986    Smokeless tobacco: Never   Vaping Use    Vaping status: Never Used   Substance and Sexual Activity    Alcohol use: Never    Drug use: Never    Sexual activity: Not Currently   Other Topics Concern    None   Social History Narrative    None      Social Determinants of Health           Financial Resource Strain: Low Risk  (6/24/2024)     Overall Financial Resource Strain (CARDIA)      Difficulty of Paying Living Expenses: Not hard at all   Food Insecurity: No Food Insecurity (6/25/2024)     Hunger Vital Sign      Worried About Running Out of Food in the Last Year: Never true      Ran Out of Food in the Last Year: Never true   Transportation Needs: No Transportation Needs (6/24/2024)     PRAPARE - Transportation      Lack of Transportation (Medical): No      Lack of Transportation (Non-Medical): No   Physical Activity: Not on file   Stress: Not on file   Social Connections: Not on file   Intimate Partner Violence: Not on file   Housing Stability: Low Risk  (6/24/2024)     Housing Stability Vital Sign      Unable to Pay for Housing in the Last Year: No      Number of Places Lived in the Last Year: 1      Unstable Housing in the Last Year: No         Family History          Family History   Problem Relation Name Age of Onset    Colon cancer Mother        No Known Problems Father        Colon cancer Other             Surgical History         Past Surgical History:   Procedure Laterality Date    HYSTERECTOMY   08/17/2017     Hysterectomy            REVIEW OF SYSTEMS  DERM:   + as noted in HPI.         Physical examination:   On General Observation: Patient is a pleasant, cooperative, well developed 64 y.o. diabetic   adult female. The patient is alert and oriented to time, place and person. Patient has normal affect and mood.  /67   Pulse 87   Temp 36.1 °C (96.9 °F)      Vascular:  DP and PT pulses  are 2/4 b/l.  mild edema noted. no varicosities b/l.  CFT  5 seconds to all digits bilateral.  Skin temperature is warm to warm from proximal to distal bilateral.       Muscular: Strength is 5/5 for all instrinsic and extrinsic muscle groups.      Neuro:  Proprioception present.   Sensation to vibration is  intact. Protective sensation present  at all pedal sites via Ferdinand Adam 5.07 monofilament bilateral.  Light touch present bilateral.      Derm:    Left toenails: 1-5 Brittleness, crumbling upon debridement, subungual debris, elongation, mycotic appearance, tenderness, and thickness.   Right toenails: 1-5 Brittleness, crumbling upon debridement, subungual debris, elongation, mycotic appearance, tenderness, and thickness.   Hair growth is decreased b/l le     Ortho:  Pain is present to the top of the midfoot and toes, decreased rom of the 1st mpj, mtj, aj, stj.    ASSESSMENT:    OA feet  E11.9   Pain feet     PLAN:   Exam  A comprehensive history and physical examination were preformed. DM foot care and DM foot manifestations were reviewed.  The patient was educated on clinical findings, diagnosis and treatment plans. Patient understands all that has been explained and all questions were answered to apparent satisfaction.   -Reviewed etiology and options for the pain, will start price, will order xrays and topical vlotaren gel.        Loc Cordero DPM

## 2024-10-22 ENCOUNTER — HOSPITAL ENCOUNTER (OUTPATIENT)
Dept: RADIOLOGY | Facility: CLINIC | Age: 64
Discharge: HOME | End: 2024-10-22
Payer: COMMERCIAL

## 2024-10-22 VITALS — HEIGHT: 72 IN | BODY MASS INDEX: 39.68 KG/M2 | WEIGHT: 293 LBS

## 2024-10-22 DIAGNOSIS — M19.071 PRIMARY OSTEOARTHRITIS OF BOTH FEET: ICD-10-CM

## 2024-10-22 DIAGNOSIS — E11.9 DIABETES MELLITUS WITHOUT COMPLICATION (MULTI): ICD-10-CM

## 2024-10-22 DIAGNOSIS — B35.1 TINEA UNGUIUM: ICD-10-CM

## 2024-10-22 DIAGNOSIS — M79.671 RIGHT FOOT PAIN: ICD-10-CM

## 2024-10-22 DIAGNOSIS — K21.00 GASTROESOPHAGEAL REFLUX DISEASE WITH ESOPHAGITIS WITHOUT HEMORRHAGE: ICD-10-CM

## 2024-10-22 DIAGNOSIS — M19.072 PRIMARY OSTEOARTHRITIS OF BOTH FEET: ICD-10-CM

## 2024-10-22 DIAGNOSIS — Z12.31 ENCOUNTER FOR SCREENING MAMMOGRAM FOR MALIGNANT NEOPLASM OF BREAST: ICD-10-CM

## 2024-10-22 DIAGNOSIS — M79.672 LEFT FOOT PAIN: ICD-10-CM

## 2024-10-22 PROCEDURE — 73630 X-RAY EXAM OF FOOT: CPT | Mod: RIGHT SIDE | Performed by: RADIOLOGY

## 2024-10-22 PROCEDURE — 77067 SCR MAMMO BI INCL CAD: CPT

## 2024-10-22 PROCEDURE — 73630 X-RAY EXAM OF FOOT: CPT | Mod: LT

## 2024-10-22 PROCEDURE — 73630 X-RAY EXAM OF FOOT: CPT | Mod: RT

## 2024-10-22 PROCEDURE — 73630 X-RAY EXAM OF FOOT: CPT | Mod: LEFT SIDE | Performed by: RADIOLOGY

## 2024-10-22 PROCEDURE — 77063 BREAST TOMOSYNTHESIS BI: CPT

## 2024-10-22 RX ORDER — CICLOPIROX 80 MG/ML
SOLUTION TOPICAL NIGHTLY
Qty: 6.6 ML | Refills: 0 | Status: SHIPPED | OUTPATIENT
Start: 2024-10-22

## 2024-10-22 RX ORDER — PANTOPRAZOLE SODIUM 40 MG/1
40 TABLET, DELAYED RELEASE ORAL 2 TIMES DAILY
Qty: 60 TABLET | Refills: 1 | Status: SHIPPED | OUTPATIENT
Start: 2024-10-22

## 2024-10-30 ENCOUNTER — APPOINTMENT (OUTPATIENT)
Dept: PRIMARY CARE | Facility: CLINIC | Age: 64
End: 2024-10-30
Payer: COMMERCIAL

## 2024-10-30 VITALS
RESPIRATION RATE: 15 BRPM | SYSTOLIC BLOOD PRESSURE: 130 MMHG | OXYGEN SATURATION: 99 % | WEIGHT: 293 LBS | HEART RATE: 78 BPM | BODY MASS INDEX: 39.68 KG/M2 | DIASTOLIC BLOOD PRESSURE: 80 MMHG | HEIGHT: 72 IN | TEMPERATURE: 92.3 F

## 2024-10-30 DIAGNOSIS — F17.200 CURRENT SMOKER: ICD-10-CM

## 2024-10-30 DIAGNOSIS — I10 BENIGN ESSENTIAL HYPERTENSION: ICD-10-CM

## 2024-10-30 DIAGNOSIS — E11.9 TYPE 2 DIABETES MELLITUS WITHOUT COMPLICATION, WITHOUT LONG-TERM CURRENT USE OF INSULIN (MULTI): Primary | ICD-10-CM

## 2024-10-30 DIAGNOSIS — Z23 NEED FOR VACCINATION FOR H FLU TYPE B: ICD-10-CM

## 2024-10-30 DIAGNOSIS — E78.2 HYPERLIPIDEMIA, MIXED: ICD-10-CM

## 2024-10-30 PROCEDURE — 3008F BODY MASS INDEX DOCD: CPT | Performed by: STUDENT IN AN ORGANIZED HEALTH CARE EDUCATION/TRAINING PROGRAM

## 2024-10-30 PROCEDURE — 3075F SYST BP GE 130 - 139MM HG: CPT | Performed by: STUDENT IN AN ORGANIZED HEALTH CARE EDUCATION/TRAINING PROGRAM

## 2024-10-30 PROCEDURE — 3044F HG A1C LEVEL LT 7.0%: CPT | Performed by: STUDENT IN AN ORGANIZED HEALTH CARE EDUCATION/TRAINING PROGRAM

## 2024-10-30 PROCEDURE — 3062F POS MACROALBUMINURIA REV: CPT | Performed by: STUDENT IN AN ORGANIZED HEALTH CARE EDUCATION/TRAINING PROGRAM

## 2024-10-30 PROCEDURE — 3079F DIAST BP 80-89 MM HG: CPT | Performed by: STUDENT IN AN ORGANIZED HEALTH CARE EDUCATION/TRAINING PROGRAM

## 2024-10-30 PROCEDURE — 90662 IIV NO PRSV INCREASED AG IM: CPT | Performed by: STUDENT IN AN ORGANIZED HEALTH CARE EDUCATION/TRAINING PROGRAM

## 2024-10-30 PROCEDURE — 1123F ACP DISCUSS/DSCN MKR DOCD: CPT | Performed by: STUDENT IN AN ORGANIZED HEALTH CARE EDUCATION/TRAINING PROGRAM

## 2024-10-30 PROCEDURE — 1159F MED LIST DOCD IN RCRD: CPT | Performed by: STUDENT IN AN ORGANIZED HEALTH CARE EDUCATION/TRAINING PROGRAM

## 2024-10-30 PROCEDURE — 1126F AMNT PAIN NOTED NONE PRSNT: CPT | Performed by: STUDENT IN AN ORGANIZED HEALTH CARE EDUCATION/TRAINING PROGRAM

## 2024-10-30 PROCEDURE — 1160F RVW MEDS BY RX/DR IN RCRD: CPT | Performed by: STUDENT IN AN ORGANIZED HEALTH CARE EDUCATION/TRAINING PROGRAM

## 2024-10-30 PROCEDURE — 99213 OFFICE O/P EST LOW 20 MIN: CPT | Performed by: STUDENT IN AN ORGANIZED HEALTH CARE EDUCATION/TRAINING PROGRAM

## 2024-10-30 PROCEDURE — 90471 IMMUNIZATION ADMIN: CPT | Performed by: STUDENT IN AN ORGANIZED HEALTH CARE EDUCATION/TRAINING PROGRAM

## 2024-10-30 PROCEDURE — 3048F LDL-C <100 MG/DL: CPT | Performed by: STUDENT IN AN ORGANIZED HEALTH CARE EDUCATION/TRAINING PROGRAM

## 2024-10-30 RX ORDER — LANCETS 33 GAUGE
EACH MISCELLANEOUS
Qty: 100 EACH | Refills: 2 | Status: SHIPPED | OUTPATIENT
Start: 2024-10-30

## 2024-10-30 SDOH — ECONOMIC STABILITY: FOOD INSECURITY: WITHIN THE PAST 12 MONTHS, YOU WORRIED THAT YOUR FOOD WOULD RUN OUT BEFORE YOU GOT MONEY TO BUY MORE.: NEVER TRUE

## 2024-10-30 SDOH — ECONOMIC STABILITY: FOOD INSECURITY: WITHIN THE PAST 12 MONTHS, THE FOOD YOU BOUGHT JUST DIDN'T LAST AND YOU DIDN'T HAVE MONEY TO GET MORE.: NEVER TRUE

## 2024-10-30 ASSESSMENT — ENCOUNTER SYMPTOMS
COLOR CHANGE: 0
VOMITING: 0
DIZZINESS: 0
MUSCULOSKELETAL NEGATIVE: 1
COUGH: 0
WHEEZING: 0
HEADACHES: 0
DIARRHEA: 0
FATIGUE: 0
FEVER: 0
NAUSEA: 0
PALPITATIONS: 0
SHORTNESS OF BREATH: 0
CHILLS: 0
CONSTIPATION: 0
UNEXPECTED WEIGHT CHANGE: 0
ABDOMINAL PAIN: 0
CONFUSION: 0

## 2024-10-30 ASSESSMENT — PATIENT HEALTH QUESTIONNAIRE - PHQ9
2. FEELING DOWN, DEPRESSED OR HOPELESS: NOT AT ALL
SUM OF ALL RESPONSES TO PHQ9 QUESTIONS 1 & 2: 0
1. LITTLE INTEREST OR PLEASURE IN DOING THINGS: NOT AT ALL

## 2024-10-30 ASSESSMENT — PAIN SCALES - GENERAL: PAINLEVEL_OUTOF10: 0-NO PAIN

## 2024-10-30 ASSESSMENT — LIFESTYLE VARIABLES
AUDIT-C TOTAL SCORE: 0
SKIP TO QUESTIONS 9-10: 1
HOW OFTEN DO YOU HAVE A DRINK CONTAINING ALCOHOL: NEVER
HOW OFTEN DO YOU HAVE SIX OR MORE DRINKS ON ONE OCCASION: NEVER
HOW MANY STANDARD DRINKS CONTAINING ALCOHOL DO YOU HAVE ON A TYPICAL DAY: PATIENT DOES NOT DRINK

## 2024-12-24 DIAGNOSIS — K21.00 GASTROESOPHAGEAL REFLUX DISEASE WITH ESOPHAGITIS WITHOUT HEMORRHAGE: ICD-10-CM

## 2024-12-24 RX ORDER — PANTOPRAZOLE SODIUM 40 MG/1
40 TABLET, DELAYED RELEASE ORAL 2 TIMES DAILY
Qty: 60 TABLET | Refills: 0 | Status: SHIPPED | OUTPATIENT
Start: 2024-12-24

## 2025-01-03 ENCOUNTER — LAB (OUTPATIENT)
Dept: LAB | Facility: LAB | Age: 65
End: 2025-01-03
Payer: COMMERCIAL

## 2025-01-03 DIAGNOSIS — E11.9 TYPE 2 DIABETES MELLITUS WITHOUT COMPLICATION, WITHOUT LONG-TERM CURRENT USE OF INSULIN (MULTI): ICD-10-CM

## 2025-01-03 DIAGNOSIS — E78.2 HYPERLIPIDEMIA, MIXED: ICD-10-CM

## 2025-01-03 LAB
ALBUMIN SERPL BCP-MCNC: 3.9 G/DL (ref 3.4–5)
ALP SERPL-CCNC: 108 U/L (ref 33–136)
ALT SERPL W P-5'-P-CCNC: 16 U/L (ref 7–45)
ANION GAP SERPL CALC-SCNC: 12 MMOL/L (ref 10–20)
AST SERPL W P-5'-P-CCNC: 15 U/L (ref 9–39)
BASOPHILS # BLD AUTO: 0.04 X10*3/UL (ref 0–0.1)
BASOPHILS NFR BLD AUTO: 0.7 %
BILIRUB SERPL-MCNC: 0.3 MG/DL (ref 0–1.2)
BUN SERPL-MCNC: 13 MG/DL (ref 6–23)
CALCIUM SERPL-MCNC: 9 MG/DL (ref 8.6–10.3)
CHLORIDE SERPL-SCNC: 104 MMOL/L (ref 98–107)
CHOLEST SERPL-MCNC: 144 MG/DL (ref 0–199)
CHOLESTEROL/HDL RATIO: 2.7
CO2 SERPL-SCNC: 27 MMOL/L (ref 21–32)
CREAT SERPL-MCNC: 0.85 MG/DL (ref 0.5–1.05)
EGFRCR SERPLBLD CKD-EPI 2021: 76 ML/MIN/1.73M*2
EOSINOPHIL # BLD AUTO: 0.09 X10*3/UL (ref 0–0.7)
EOSINOPHIL NFR BLD AUTO: 1.6 %
ERYTHROCYTE [DISTWIDTH] IN BLOOD BY AUTOMATED COUNT: 15.4 % (ref 11.5–14.5)
EST. AVERAGE GLUCOSE BLD GHB EST-MCNC: 131 MG/DL
GLUCOSE SERPL-MCNC: 100 MG/DL (ref 74–99)
HBA1C MFR BLD: 6.2 %
HCT VFR BLD AUTO: 44.4 % (ref 36–46)
HDLC SERPL-MCNC: 54.1 MG/DL
HGB BLD-MCNC: 13.9 G/DL (ref 12–16)
IMM GRANULOCYTES # BLD AUTO: 0.02 X10*3/UL (ref 0–0.7)
IMM GRANULOCYTES NFR BLD AUTO: 0.3 % (ref 0–0.9)
LDLC SERPL CALC-MCNC: 69 MG/DL
LYMPHOCYTES # BLD AUTO: 2.44 X10*3/UL (ref 1.2–4.8)
LYMPHOCYTES NFR BLD AUTO: 42.4 %
MCH RBC QN AUTO: 28.1 PG (ref 26–34)
MCHC RBC AUTO-ENTMCNC: 31.3 G/DL (ref 32–36)
MCV RBC AUTO: 90 FL (ref 80–100)
MONOCYTES # BLD AUTO: 0.34 X10*3/UL (ref 0.1–1)
MONOCYTES NFR BLD AUTO: 5.9 %
NEUTROPHILS # BLD AUTO: 2.83 X10*3/UL (ref 1.2–7.7)
NEUTROPHILS NFR BLD AUTO: 49.1 %
NON HDL CHOLESTEROL: 90 MG/DL (ref 0–149)
NRBC BLD-RTO: 0 /100 WBCS (ref 0–0)
PLATELET # BLD AUTO: 271 X10*3/UL (ref 150–450)
POTASSIUM SERPL-SCNC: 4.6 MMOL/L (ref 3.5–5.3)
PROT SERPL-MCNC: 6.7 G/DL (ref 6.4–8.2)
RBC # BLD AUTO: 4.95 X10*6/UL (ref 4–5.2)
SODIUM SERPL-SCNC: 138 MMOL/L (ref 136–145)
TRIGL SERPL-MCNC: 106 MG/DL (ref 0–149)
VLDL: 21 MG/DL (ref 0–40)
WBC # BLD AUTO: 5.8 X10*3/UL (ref 4.4–11.3)

## 2025-01-03 PROCEDURE — 80061 LIPID PANEL: CPT

## 2025-01-03 PROCEDURE — 85025 COMPLETE CBC W/AUTO DIFF WBC: CPT

## 2025-01-03 PROCEDURE — 83036 HEMOGLOBIN GLYCOSYLATED A1C: CPT

## 2025-01-03 PROCEDURE — 80053 COMPREHEN METABOLIC PANEL: CPT

## 2025-01-06 ENCOUNTER — APPOINTMENT (OUTPATIENT)
Dept: PODIATRY | Facility: CLINIC | Age: 65
End: 2025-01-06
Payer: COMMERCIAL

## 2025-01-06 DIAGNOSIS — L85.3 XEROSIS OF SKIN: Primary | ICD-10-CM

## 2025-01-06 DIAGNOSIS — M19.071 PRIMARY OSTEOARTHRITIS OF BOTH FEET: ICD-10-CM

## 2025-01-06 DIAGNOSIS — M19.072 PRIMARY OSTEOARTHRITIS OF BOTH FEET: ICD-10-CM

## 2025-01-06 DIAGNOSIS — E11.9 DIABETES MELLITUS WITHOUT COMPLICATION (MULTI): ICD-10-CM

## 2025-01-06 PROCEDURE — 1159F MED LIST DOCD IN RCRD: CPT | Performed by: PODIATRIST

## 2025-01-06 PROCEDURE — 1123F ACP DISCUSS/DSCN MKR DOCD: CPT | Performed by: PODIATRIST

## 2025-01-06 PROCEDURE — 99212 OFFICE O/P EST SF 10 MIN: CPT | Performed by: PODIATRIST

## 2025-01-06 PROCEDURE — 3044F HG A1C LEVEL LT 7.0%: CPT | Performed by: PODIATRIST

## 2025-01-06 PROCEDURE — 3048F LDL-C <100 MG/DL: CPT | Performed by: PODIATRIST

## 2025-01-06 RX ORDER — AMMONIUM LACTATE 12 G/100G
LOTION TOPICAL AS NEEDED
Qty: 400 G | Refills: 2 | Status: SHIPPED | OUTPATIENT
Start: 2025-01-06 | End: 2025-01-08 | Stop reason: SDUPTHER

## 2025-01-06 NOTE — PROGRESS NOTES
CC:  diabetic foot care.      HPI: Pt presents for dm foot exam, no acute issues, had the xrays done, also dry skin to the feet.        PCP: Dr. Kelly  Last visit: 10-30-24     PMH  Medical History           Past Medical History:   Diagnosis Date    Diabetes (Multi)      Hyperlipidemia      Hypertension      Personal history of other diseases of the circulatory system       History of hypertension    Personal history of other endocrine, nutritional and metabolic disease       History of hyperlipidemia         MEDS     Current Medications      Current Outpatient Medications:     alcohol swabs (BD Alcohol Swabs) pads, medicated, TESTING ONCE DAILY, Disp: 100 each, Rfl: 2    amLODIPine (Norvasc) 10 mg tablet, Take 1 tablet (10 mg) by mouth once daily., Disp: 90 tablet, Rfl: 1    cholecalciferol (Vitamin D-3) 25 MCG (1000 UT) capsule, Take 1 capsule (25 mcg) by mouth once daily., Disp: 90 capsule, Rfl: 1    empagliflozin (Jardiance) 25 mg, Take 1 tablet (25 mg) by mouth once daily., Disp: 90 tablet, Rfl: 1    pantoprazole (ProtoNix) 40 mg EC tablet, Take 1 tablet (40 mg) by mouth 2 times a day before meals. Do not crush, chew, or split., Disp: 60 tablet, Rfl: 0    PARoxetine (Paxil) 10 mg tablet, Take 1 tablet (10 mg) by mouth once daily at bedtime., Disp: 90 tablet, Rfl: 1    pravastatin (Pravachol) 40 mg tablet, Take 1 tablet (40 mg) by mouth once daily., Disp: 90 tablet, Rfl: 1    True Metrix Glucose Test Strip strip, USE 1 STRIP TWICE DAILY., Disp: 100 strip, Rfl: 2    Unilet Lancet 33 gauge misc, USE AS DIRECTED ONCE DAILY, Disp: 100 each, Rfl: 2      Allergies  Allergies   No Known Allergies      Social History   Social History                Socioeconomic History    Marital status: Single       Spouse name: None    Number of children: None    Years of education: None    Highest education level: None   Occupational History    None   Tobacco Use    Smoking status: Every Day       Current packs/day: 0.50        Average packs/day: 0.7 packs/day for 58.5 years (39.2 ttl pk-yrs)       Types: Cigarettes       Start date: 1986    Smokeless tobacco: Never   Vaping Use    Vaping status: Never Used   Substance and Sexual Activity    Alcohol use: Never    Drug use: Never    Sexual activity: Not Currently   Other Topics Concern    None   Social History Narrative    None      Social Determinants of Health              Financial Resource Strain: Low Risk  (6/24/2024)     Overall Financial Resource Strain (CARDIA)      Difficulty of Paying Living Expenses: Not hard at all   Food Insecurity: No Food Insecurity (6/25/2024)     Hunger Vital Sign      Worried About Running Out of Food in the Last Year: Never true      Ran Out of Food in the Last Year: Never true   Transportation Needs: No Transportation Needs (6/24/2024)     PRAPARE - Transportation      Lack of Transportation (Medical): No      Lack of Transportation (Non-Medical): No   Physical Activity: Not on file   Stress: Not on file   Social Connections: Not on file   Intimate Partner Violence: Not on file   Housing Stability: Low Risk  (6/24/2024)     Housing Stability Vital Sign      Unable to Pay for Housing in the Last Year: No      Number of Places Lived in the Last Year: 1      Unstable Housing in the Last Year: No         Family History               Family History   Problem Relation Name Age of Onset    Colon cancer Mother        No Known Problems Father        Colon cancer Other             Surgical History             Past Surgical History:   Procedure Laterality Date    HYSTERECTOMY   08/17/2017     Hysterectomy            REVIEW OF SYSTEMS  DERM:   + as noted in HPI.         Physical examination:   On General Observation: Patient is a pleasant, cooperative, well developed 64 y.o. diabetic   adult female. The patient is alert and oriented to time, place and person. Patient has normal affect and mood.  /67   Pulse 87   Temp 36.1 °C (96.9 °F)      Vascular:  DP and  PT pulses are 2/4 b/l.  mild edema noted. no varicosities b/l.  CFT  5 seconds to all digits bilateral.  Skin temperature is warm to warm from proximal to distal bilateral.       Muscular: Strength is 5/5 for all instrinsic and extrinsic muscle groups.      Neuro:  Proprioception present.   Sensation to vibration is  intact. Protective sensation present  at all pedal sites via [place] Adam 5.07 monofilament bilateral.  Light touch present bilateral.      Derm:    Left toenails: 1-5 Brittleness, crumbling upon debridement, subungual debris, elongation, mycotic appearance, tenderness, and thickness.   Right toenails: 1-5 Brittleness, crumbling upon debridement, subungual debris, elongation, mycotic appearance, tenderness, and thickness.   Hair growth is decreased b/l le  Dry skin plantar feet    Ortho:  Pain is present to the top of the midfoot and toes, decreased rom of the 1st mpj, mtj, aj, stj.     ASSESSMENT:    Xerosis  OA feet  E11.9   Pain feet     PLAN:   Exam  A comprehensive history and physical examination were preformed. DM foot care and DM foot manifestations were reviewed.  The patient was educated on clinical findings, diagnosis and treatment plans. Patient understands all that has been explained and all questions were answered to apparent satisfaction.   -reviewed xrays for pt OA and heel psurs  -Will rx for lac hydrin lauren Cordero DPM

## 2025-01-08 ENCOUNTER — APPOINTMENT (OUTPATIENT)
Dept: PRIMARY CARE | Facility: CLINIC | Age: 65
End: 2025-01-08
Payer: COMMERCIAL

## 2025-01-08 VITALS
OXYGEN SATURATION: 96 % | TEMPERATURE: 96.6 F | BODY MASS INDEX: 39.68 KG/M2 | DIASTOLIC BLOOD PRESSURE: 82 MMHG | WEIGHT: 293 LBS | RESPIRATION RATE: 16 BRPM | HEART RATE: 103 BPM | SYSTOLIC BLOOD PRESSURE: 130 MMHG | HEIGHT: 72 IN

## 2025-01-08 DIAGNOSIS — E55.9 VITAMIN D DEFICIENCY: ICD-10-CM

## 2025-01-08 DIAGNOSIS — E78.2 HYPERLIPIDEMIA, MIXED: ICD-10-CM

## 2025-01-08 DIAGNOSIS — I10 BENIGN ESSENTIAL HYPERTENSION: ICD-10-CM

## 2025-01-08 DIAGNOSIS — L85.3 XEROSIS OF SKIN: ICD-10-CM

## 2025-01-08 DIAGNOSIS — R53.81 PHYSICAL DECONDITIONING: ICD-10-CM

## 2025-01-08 DIAGNOSIS — F17.200 CURRENT SMOKER: ICD-10-CM

## 2025-01-08 DIAGNOSIS — N95.1 HOT FLASHES DUE TO MENOPAUSE: ICD-10-CM

## 2025-01-08 DIAGNOSIS — Z00.00 ROUTINE GENERAL MEDICAL EXAMINATION AT A HEALTH CARE FACILITY: Primary | ICD-10-CM

## 2025-01-08 DIAGNOSIS — E11.9 TYPE 2 DIABETES MELLITUS WITHOUT COMPLICATION, WITHOUT LONG-TERM CURRENT USE OF INSULIN (MULTI): ICD-10-CM

## 2025-01-08 DIAGNOSIS — M08.861: ICD-10-CM

## 2025-01-08 PROCEDURE — 3075F SYST BP GE 130 - 139MM HG: CPT | Performed by: STUDENT IN AN ORGANIZED HEALTH CARE EDUCATION/TRAINING PROGRAM

## 2025-01-08 PROCEDURE — 99397 PER PM REEVAL EST PAT 65+ YR: CPT | Performed by: STUDENT IN AN ORGANIZED HEALTH CARE EDUCATION/TRAINING PROGRAM

## 2025-01-08 PROCEDURE — 3079F DIAST BP 80-89 MM HG: CPT | Performed by: STUDENT IN AN ORGANIZED HEALTH CARE EDUCATION/TRAINING PROGRAM

## 2025-01-08 PROCEDURE — 3008F BODY MASS INDEX DOCD: CPT | Performed by: STUDENT IN AN ORGANIZED HEALTH CARE EDUCATION/TRAINING PROGRAM

## 2025-01-08 PROCEDURE — 1123F ACP DISCUSS/DSCN MKR DOCD: CPT | Performed by: STUDENT IN AN ORGANIZED HEALTH CARE EDUCATION/TRAINING PROGRAM

## 2025-01-08 PROCEDURE — 3044F HG A1C LEVEL LT 7.0%: CPT | Performed by: STUDENT IN AN ORGANIZED HEALTH CARE EDUCATION/TRAINING PROGRAM

## 2025-01-08 PROCEDURE — 1159F MED LIST DOCD IN RCRD: CPT | Performed by: STUDENT IN AN ORGANIZED HEALTH CARE EDUCATION/TRAINING PROGRAM

## 2025-01-08 PROCEDURE — 99213 OFFICE O/P EST LOW 20 MIN: CPT | Performed by: STUDENT IN AN ORGANIZED HEALTH CARE EDUCATION/TRAINING PROGRAM

## 2025-01-08 PROCEDURE — 3048F LDL-C <100 MG/DL: CPT | Performed by: STUDENT IN AN ORGANIZED HEALTH CARE EDUCATION/TRAINING PROGRAM

## 2025-01-08 PROCEDURE — 1160F RVW MEDS BY RX/DR IN RCRD: CPT | Performed by: STUDENT IN AN ORGANIZED HEALTH CARE EDUCATION/TRAINING PROGRAM

## 2025-01-08 RX ORDER — AMLODIPINE BESYLATE 10 MG/1
10 TABLET ORAL DAILY
Qty: 90 TABLET | Refills: 1 | Status: SHIPPED | OUTPATIENT
Start: 2025-01-08

## 2025-01-08 RX ORDER — CALCIUM CITRATE/VITAMIN D3 200MG-6.25
TABLET ORAL
Qty: 100 STRIP | Refills: 2 | Status: SHIPPED | OUTPATIENT
Start: 2025-01-08

## 2025-01-08 RX ORDER — LANCETS 33 GAUGE
EACH MISCELLANEOUS
Qty: 100 EACH | Refills: 2 | Status: SHIPPED | OUTPATIENT
Start: 2025-01-08

## 2025-01-08 RX ORDER — VIT C/E/ZN/COPPR/LUTEIN/ZEAXAN 250MG-90MG
25 CAPSULE ORAL DAILY
Qty: 90 CAPSULE | Refills: 1 | Status: SHIPPED | OUTPATIENT
Start: 2025-01-08

## 2025-01-08 RX ORDER — PRAVASTATIN SODIUM 40 MG/1
40 TABLET ORAL DAILY
Qty: 30 TABLET | Refills: 5 | Status: SHIPPED | OUTPATIENT
Start: 2025-01-08

## 2025-01-08 RX ORDER — ISOPROPYL ALCOHOL 70 ML/100ML
SWAB TOPICAL
Qty: 100 EACH | Refills: 2 | Status: SHIPPED | OUTPATIENT
Start: 2025-01-08

## 2025-01-08 RX ORDER — AMMONIUM LACTATE 12 G/100G
LOTION TOPICAL AS NEEDED
Qty: 400 G | Refills: 2 | Status: SHIPPED | OUTPATIENT
Start: 2025-01-08 | End: 2025-02-07

## 2025-01-08 SDOH — ECONOMIC STABILITY: FOOD INSECURITY: WITHIN THE PAST 12 MONTHS, YOU WORRIED THAT YOUR FOOD WOULD RUN OUT BEFORE YOU GOT MONEY TO BUY MORE.: NEVER TRUE

## 2025-01-08 SDOH — ECONOMIC STABILITY: FOOD INSECURITY: WITHIN THE PAST 12 MONTHS, THE FOOD YOU BOUGHT JUST DIDN'T LAST AND YOU DIDN'T HAVE MONEY TO GET MORE.: NEVER TRUE

## 2025-01-08 ASSESSMENT — ENCOUNTER SYMPTOMS
CONFUSION: 0
MUSCULOSKELETAL NEGATIVE: 1
COLOR CHANGE: 0
DIARRHEA: 0
COUGH: 0
NAUSEA: 0
PALPITATIONS: 0
FATIGUE: 0
FEVER: 0
ABDOMINAL PAIN: 0
HEADACHES: 0
UNEXPECTED WEIGHT CHANGE: 0
CHILLS: 0
VOMITING: 0
SHORTNESS OF BREATH: 0
CONSTIPATION: 0
DIZZINESS: 0
WHEEZING: 0

## 2025-01-08 ASSESSMENT — LIFESTYLE VARIABLES
AUDIT-C TOTAL SCORE: 0
HOW OFTEN DO YOU HAVE SIX OR MORE DRINKS ON ONE OCCASION: NEVER
HOW MANY STANDARD DRINKS CONTAINING ALCOHOL DO YOU HAVE ON A TYPICAL DAY: PATIENT DOES NOT DRINK
HOW OFTEN DO YOU HAVE A DRINK CONTAINING ALCOHOL: NEVER
SKIP TO QUESTIONS 9-10: 1

## 2025-01-21 DIAGNOSIS — K21.00 GASTROESOPHAGEAL REFLUX DISEASE WITH ESOPHAGITIS WITHOUT HEMORRHAGE: ICD-10-CM

## 2025-01-21 RX ORDER — PANTOPRAZOLE SODIUM 40 MG/1
40 TABLET, DELAYED RELEASE ORAL 2 TIMES DAILY
Qty: 60 TABLET | Refills: 0 | Status: SHIPPED | OUTPATIENT
Start: 2025-01-21

## 2025-01-27 ENCOUNTER — TELEPHONE (OUTPATIENT)
Facility: CLINIC | Age: 65
End: 2025-01-27
Payer: COMMERCIAL

## 2025-02-21 DIAGNOSIS — K21.00 GASTROESOPHAGEAL REFLUX DISEASE WITH ESOPHAGITIS WITHOUT HEMORRHAGE: ICD-10-CM

## 2025-02-21 RX ORDER — PANTOPRAZOLE SODIUM 40 MG/1
TABLET, DELAYED RELEASE ORAL
Qty: 60 TABLET | Refills: 0 | Status: SHIPPED | OUTPATIENT
Start: 2025-02-21

## 2025-03-24 DIAGNOSIS — K21.00 GASTROESOPHAGEAL REFLUX DISEASE WITH ESOPHAGITIS WITHOUT HEMORRHAGE: ICD-10-CM

## 2025-03-24 RX ORDER — PANTOPRAZOLE SODIUM 40 MG/1
TABLET, DELAYED RELEASE ORAL
Qty: 60 TABLET | Refills: 0 | OUTPATIENT
Start: 2025-03-24

## 2025-03-25 ENCOUNTER — APPOINTMENT (OUTPATIENT)
Facility: CLINIC | Age: 65
End: 2025-03-25
Payer: COMMERCIAL

## 2025-04-07 ENCOUNTER — APPOINTMENT (OUTPATIENT)
Dept: PODIATRY | Facility: CLINIC | Age: 65
End: 2025-04-07
Payer: COMMERCIAL

## 2025-04-07 DIAGNOSIS — Z79.84 LONG TERM CURRENT USE OF ORAL HYPOGLYCEMIC DRUG: ICD-10-CM

## 2025-04-07 DIAGNOSIS — E11.9 DIABETES MELLITUS WITHOUT COMPLICATION: Primary | ICD-10-CM

## 2025-04-07 PROCEDURE — 3048F LDL-C <100 MG/DL: CPT | Performed by: PODIATRIST

## 2025-04-07 PROCEDURE — 1123F ACP DISCUSS/DSCN MKR DOCD: CPT | Performed by: PODIATRIST

## 2025-04-07 PROCEDURE — 1159F MED LIST DOCD IN RCRD: CPT | Performed by: PODIATRIST

## 2025-04-07 PROCEDURE — 99212 OFFICE O/P EST SF 10 MIN: CPT | Performed by: PODIATRIST

## 2025-04-07 PROCEDURE — 3044F HG A1C LEVEL LT 7.0%: CPT | Performed by: PODIATRIST

## 2025-04-07 NOTE — PROGRESS NOTES
CC:  diabetic foot care.      HPI: Pt presents for dm foot exam, no acute issues, had the xrays done, also dry skin to the feet. Last AIC 6.2. On Jardiance.        PCP: Dr. Kelly  Last visit: 1-8-25     PMH  Medical History           Past Medical History:   Diagnosis Date    Diabetes (Multi)      Hyperlipidemia      Hypertension      Personal history of other diseases of the circulatory system       History of hypertension    Personal history of other endocrine, nutritional and metabolic disease       History of hyperlipidemia         MEDS     Current Medications      Current Outpatient Medications:     alcohol swabs (BD Alcohol Swabs) pads, medicated, TESTING ONCE DAILY, Disp: 100 each, Rfl: 2    amLODIPine (Norvasc) 10 mg tablet, Take 1 tablet (10 mg) by mouth once daily., Disp: 90 tablet, Rfl: 1    cholecalciferol (Vitamin D-3) 25 MCG (1000 UT) capsule, Take 1 capsule (25 mcg) by mouth once daily., Disp: 90 capsule, Rfl: 1    empagliflozin (Jardiance) 25 mg, Take 1 tablet (25 mg) by mouth once daily., Disp: 90 tablet, Rfl: 1    pantoprazole (ProtoNix) 40 mg EC tablet, Take 1 tablet (40 mg) by mouth 2 times a day before meals. Do not crush, chew, or split., Disp: 60 tablet, Rfl: 0    PARoxetine (Paxil) 10 mg tablet, Take 1 tablet (10 mg) by mouth once daily at bedtime., Disp: 90 tablet, Rfl: 1    pravastatin (Pravachol) 40 mg tablet, Take 1 tablet (40 mg) by mouth once daily., Disp: 90 tablet, Rfl: 1    True Metrix Glucose Test Strip strip, USE 1 STRIP TWICE DAILY., Disp: 100 strip, Rfl: 2    Unilet Lancet 33 gauge misc, USE AS DIRECTED ONCE DAILY, Disp: 100 each, Rfl: 2      Allergies  Allergies   No Known Allergies      Social History   Social History                Socioeconomic History    Marital status: Single       Spouse name: None    Number of children: None    Years of education: None    Highest education level: None   Occupational History    None   Tobacco Use    Smoking status: Every Day       Current  packs/day: 0.50       Average packs/day: 0.7 packs/day for 58.5 years (39.2 ttl pk-yrs)       Types: Cigarettes       Start date: 1986    Smokeless tobacco: Never   Vaping Use    Vaping status: Never Used   Substance and Sexual Activity    Alcohol use: Never    Drug use: Never    Sexual activity: Not Currently   Other Topics Concern    None   Social History Narrative    None      Social Determinants of Health              Financial Resource Strain: Low Risk  (6/24/2024)     Overall Financial Resource Strain (CARDIA)      Difficulty of Paying Living Expenses: Not hard at all   Food Insecurity: No Food Insecurity (6/25/2024)     Hunger Vital Sign      Worried About Running Out of Food in the Last Year: Never true      Ran Out of Food in the Last Year: Never true   Transportation Needs: No Transportation Needs (6/24/2024)     PRAPARE - Transportation      Lack of Transportation (Medical): No      Lack of Transportation (Non-Medical): No   Physical Activity: Not on file   Stress: Not on file   Social Connections: Not on file   Intimate Partner Violence: Not on file   Housing Stability: Low Risk  (6/24/2024)     Housing Stability Vital Sign      Unable to Pay for Housing in the Last Year: No      Number of Places Lived in the Last Year: 1      Unstable Housing in the Last Year: No         Family History               Family History   Problem Relation Name Age of Onset    Colon cancer Mother        No Known Problems Father        Colon cancer Other             Surgical History             Past Surgical History:   Procedure Laterality Date    HYSTERECTOMY   08/17/2017     Hysterectomy            REVIEW OF SYSTEMS  DERM:   + as noted in HPI.         Physical examination:   On General Observation: Patient is a pleasant, cooperative, well developed 64 y.o. diabetic   adult female. The patient is alert and oriented to time, place and person. Patient has normal affect and mood.  /67   Pulse 87   Temp 36.1 °C (96.9 °F)       Vascular:  DP and PT pulses are 2/4 b/l.  mild edema noted. no varicosities b/l.  CFT  5 seconds to all digits bilateral.  Skin temperature is warm to warm from proximal to distal bilateral.       Muscular: Strength is 5/5 for all instrinsic and extrinsic muscle groups.      Neuro:  Proprioception present.   Sensation to vibration is  intact. Protective sensation present  at all pedal sites via [place] Adam 5.07 monofilament bilateral.  Light touch present bilateral.      Derm:    Left toenails: 1-5 Brittleness, crumbling upon debridement, subungual debris, elongation, mycotic appearance, tenderness, and thickness.   Right toenails: 1-5 Brittleness, crumbling upon debridement, subungual debris, elongation, mycotic appearance, tenderness, and thickness.   Hair growth is decreased b/l le  Dry skin plantar feet     Ortho:  Pain is present to the top of the midfoot and toes, decreased rom of the 1st mpj, mtj, aj, stj.     ASSESSMENT:      E11.9  Pain feet     PLAN:   Exam  A comprehensive history and physical examination were preformed. DM foot care and DM foot manifestations were reviewed.  The patient was educated on clinical findings, diagnosis and treatment plans. Patient understands all that has been explained and all questions were answered to apparent satisfaction.   -Follow up 6 months.  -Continue for dano Cordero DPM

## 2025-04-08 ENCOUNTER — APPOINTMENT (OUTPATIENT)
Facility: CLINIC | Age: 65
End: 2025-04-08
Payer: COMMERCIAL

## 2025-04-08 VITALS
SYSTOLIC BLOOD PRESSURE: 120 MMHG | WEIGHT: 293 LBS | HEART RATE: 82 BPM | HEIGHT: 72 IN | BODY MASS INDEX: 39.68 KG/M2 | OXYGEN SATURATION: 93 % | DIASTOLIC BLOOD PRESSURE: 79 MMHG

## 2025-04-08 DIAGNOSIS — K21.9 GASTROESOPHAGEAL REFLUX DISEASE WITHOUT ESOPHAGITIS: Primary | ICD-10-CM

## 2025-04-08 DIAGNOSIS — K21.00 GASTROESOPHAGEAL REFLUX DISEASE WITH ESOPHAGITIS WITHOUT HEMORRHAGE: ICD-10-CM

## 2025-04-08 PROCEDURE — 3044F HG A1C LEVEL LT 7.0%: CPT | Performed by: INTERNAL MEDICINE

## 2025-04-08 PROCEDURE — 3078F DIAST BP <80 MM HG: CPT | Performed by: INTERNAL MEDICINE

## 2025-04-08 PROCEDURE — 3048F LDL-C <100 MG/DL: CPT | Performed by: INTERNAL MEDICINE

## 2025-04-08 PROCEDURE — 1159F MED LIST DOCD IN RCRD: CPT | Performed by: INTERNAL MEDICINE

## 2025-04-08 PROCEDURE — 99213 OFFICE O/P EST LOW 20 MIN: CPT | Performed by: INTERNAL MEDICINE

## 2025-04-08 PROCEDURE — 3074F SYST BP LT 130 MM HG: CPT | Performed by: INTERNAL MEDICINE

## 2025-04-08 PROCEDURE — 1123F ACP DISCUSS/DSCN MKR DOCD: CPT | Performed by: INTERNAL MEDICINE

## 2025-04-08 PROCEDURE — 3008F BODY MASS INDEX DOCD: CPT | Performed by: INTERNAL MEDICINE

## 2025-04-08 RX ORDER — PANTOPRAZOLE SODIUM 40 MG/1
40 TABLET, DELAYED RELEASE ORAL
Qty: 90 TABLET | Refills: 3 | Status: SHIPPED | OUTPATIENT
Start: 2025-04-08 | End: 2026-04-03

## 2025-04-08 NOTE — PATIENT INSTRUCTIONS
I am prescribing you a medication to block acid in your stomach.  You should take this medication every day.  Take it first thing in the morning about 30 minutes before breakfast.  If you have been prescribed this medicine twice daily you should take the second dose about 30 minutes before dinner.

## 2025-04-08 NOTE — PROGRESS NOTES
[facility]    ASSESSMENT and PLAN:       Macarena Adler is a 64 y.o. female with a significant past medical diabetes, HTN, chronic back pain, tobacco use, anxiety/depression, obesity (Body mass index is 40.23 kg/m².), and HLD who presents for follow up from a recent hospitalization.        GERD  Some symptoms consistent with GERD although other aspects of her symptoms as well as the acute onset would be atypical. She   -Patient symptoms well-controlled on Protonix, continue with 40 mg Protonix once daily before breakfast risks benefits of Protonix constipation etiology for her standing  -    2.  CRC screening  Due for repeat colonoscopy in 20 26 November      Ollie Pickett DO         Gastroenterology    [facility]            Subjective   HISTORY OF PRESENT ILLNESS:     Chief Complaint  Follow-up (Last seen 7/2/24/Colon 11/29/23/EGD 8/8/24)    History Of Present Illness:    Macarena Adler is a 64 y.o. female with a significant past medical diabetes, HTN, chronic back pain, tobacco use, anxiety/depression, obesity (Body mass index is 40.23 kg/m².), and HLD who presents for follow up from a recent hospitalization.        Patient is no complaints today.  Patient tolerating PPI well 9 overall no complaints.      Patient denies any heartburn/GERD, N/V, dysphagia, odynophagia, abdominal pain, diarrhea, constipation, hematemesis, hematochezia, melena, or weight loss.      Endoscopy History:    Colon due in 2026     Review of systems:   Review of Systems      I performed a complete 10 point review of systems and it is negative except as noted in HPI or above.        PAST HISTORIES:       Past Medical History:  She has a past medical history of Diabetes (Multi), Hyperlipidemia, Hypertension, Personal history of other diseases of the circulatory system, and Personal history of other endocrine, nutritional and metabolic disease.    Past Surgical  "History:  She has a past surgical history that includes Hysterectomy (08/17/2017).      Social History:  She reports that she has been smoking cigarettes. She started smoking about 39 years ago. She has a 39.6 pack-year smoking history. She has never used smokeless tobacco. She reports that she does not drink alcohol and does not use drugs.    Family History:  No known GI disease, specifically denies pancreatitis, Crohn's, colon cancer, gastroesophageal cancer, or ulcerative colitis.    Family History   Problem Relation Name Age of Onset    Colon cancer Mother      No Known Problems Father      Colon cancer Other          Allergies:  Patient has no known allergies.        Objective   OBJECTIVE:       Last Recorded Vitals:  Vitals:    04/08/25 1014   BP: 120/79   BP Location: Right arm   Patient Position: Sitting   BP Cuff Size: Large adult   Pulse: 82   SpO2: 93%   Weight: 137 kg (302 lb)   Height: 1.854 m (6' 1\")     /79 (BP Location: Right arm, Patient Position: Sitting, BP Cuff Size: Large adult)   Pulse 82   Ht 1.854 m (6' 1\")   Wt 137 kg (302 lb)   SpO2 93%   BMI 39.84 kg/m²      Physical Exam:    Physical Exam  Vitals reviewed.   Constitutional:       General: She is awake.      Appearance: Normal appearance. She is obese.   HENT:      Head: Normocephalic.      Mouth/Throat:      Mouth: Mucous membranes are moist.   Eyes:      Extraocular Movements: Extraocular movements intact.   Cardiovascular:      Rate and Rhythm: Normal rate.      Heart sounds: Normal heart sounds.   Pulmonary:      Effort: Pulmonary effort is normal.      Breath sounds: Normal breath sounds.   Abdominal:      General: Bowel sounds are normal.      Palpations: Abdomen is soft.      Tenderness: There is no abdominal tenderness. There is no guarding or rebound.      Hernia: No hernia is present.   Musculoskeletal:         General: Normal range of motion.      Cervical back: Neck supple.   Skin:     General: Skin is warm and dry. " "  Neurological:      General: No focal deficit present.      Mental Status: She is alert.   Psychiatric:         Attention and Perception: Attention and perception normal.         Mood and Affect: Mood normal.         Behavior: Behavior normal.             Home Medications:  Prior to Admission medications    Medication Sig Start Date End Date Taking? Authorizing Provider   alcohol swabs (BD Alcohol Swabs) pads, medicated Rec to use daily & as needed to check blood sugar 1/8/25  Yes Adilson Kelly MD   amLODIPine (Norvasc) 10 mg tablet Take 1 tablet (10 mg) by mouth once daily. 1/8/25  Yes Adilson Kelly MD   blood sugar diagnostic (True Metrix Glucose Test Strip) strip Rec to use daily & as needed to check blood sugar 1/8/25  Yes Adilson Kelly MD   cholecalciferol (Vitamin D-3) 25 MCG (1000 UT) capsule Take 1 capsule (25 mcg) by mouth once daily. 1/8/25  Yes Adilson Kelly MD   empagliflozin (Jardiance) 25 mg Take 1 tablet (25 mg) by mouth once daily. 1/8/25  Yes Adilson Kelly MD   lancets (Unilet Lancet) 33 gauge misc USE AS DIRECTED ONCE DAILY 1/8/25  Yes Adilson Kelly MD   pantoprazole (ProtoNix) 40 mg EC tablet TAKE 1 TABLET BY MOUTH TWICE DAILY. Do not ceush, chew, or split 2/21/25  Yes Sanjay Pierre MD   pravastatin (Pravachol) 40 mg tablet Take 1 tablet (40 mg) by mouth once daily. 1/8/25  Yes Adilson Kelly MD         Relevant Results Recent labs reviewed in the EMR.  Lab Results   Component Value Date    HGB 13.9 01/03/2025    HGB 12.5 06/24/2024    HGB 13.4 06/23/2024    MCV 90 01/03/2025    MCV 87 06/24/2024    MCV 88 06/23/2024     01/03/2025     06/24/2024     06/23/2024       No results found for: \"FERRITIN\", \"IRON\"    Lab Results   Component Value Date     01/03/2025    K 4.6 01/03/2025     01/03/2025    BUN 13 01/03/2025    CREATININE 0.85 01/03/2025       Lab Results   Component Value Date    BILITOT 0.3 01/03/2025     Lab Results   Component " "Value Date    ALT 16 01/03/2025    ALT 16 07/29/2024    ALT 14 06/23/2024    AST 15 01/03/2025    AST 17 07/29/2024    AST 12 06/23/2024    ALKPHOS 108 01/03/2025    ALKPHOS 108 07/29/2024    ALKPHOS 121 06/23/2024       No results found for: \"CRP\"    No results found for: \"CALPS\"    Radiology: Reviewed imaging reviewed in the EMR.  Imaging  No results found.    Cardiology, Vascular, and Other Imaging  No other imaging results found for the past 7 days        "

## 2025-04-16 ENCOUNTER — TELEPHONE (OUTPATIENT)
Dept: PRIMARY CARE | Facility: CLINIC | Age: 65
End: 2025-04-16
Payer: COMMERCIAL

## 2025-04-16 NOTE — TELEPHONE ENCOUNTER
Patient called in wanting to know if her Paroxetine 10mg needed to be refilled. Looks as if it was discontinued. Please advise.

## 2025-05-22 ENCOUNTER — APPOINTMENT (OUTPATIENT)
Dept: PRIMARY CARE | Facility: CLINIC | Age: 65
End: 2025-05-22
Payer: COMMERCIAL

## 2025-05-22 VITALS
TEMPERATURE: 96 F | SYSTOLIC BLOOD PRESSURE: 111 MMHG | DIASTOLIC BLOOD PRESSURE: 74 MMHG | WEIGHT: 293 LBS | OXYGEN SATURATION: 95 % | HEART RATE: 93 BPM | HEIGHT: 72 IN | RESPIRATION RATE: 16 BRPM | BODY MASS INDEX: 39.68 KG/M2

## 2025-05-22 DIAGNOSIS — E11.9 TYPE 2 DIABETES MELLITUS WITHOUT COMPLICATION, WITHOUT LONG-TERM CURRENT USE OF INSULIN: Primary | ICD-10-CM

## 2025-05-22 DIAGNOSIS — R06.02 SOB (SHORTNESS OF BREATH) ON EXERTION: ICD-10-CM

## 2025-05-22 DIAGNOSIS — F17.200 CURRENT SMOKER: ICD-10-CM

## 2025-05-22 DIAGNOSIS — E78.2 HYPERLIPIDEMIA, MIXED: ICD-10-CM

## 2025-05-22 DIAGNOSIS — M47.819 SPINAL ARTHRITIS: ICD-10-CM

## 2025-05-22 DIAGNOSIS — G89.29 CHRONIC PAIN OF RIGHT KNEE: ICD-10-CM

## 2025-05-22 DIAGNOSIS — I10 BENIGN ESSENTIAL HYPERTENSION: ICD-10-CM

## 2025-05-22 DIAGNOSIS — E55.9 VITAMIN D DEFICIENCY: ICD-10-CM

## 2025-05-22 DIAGNOSIS — M25.561 CHRONIC PAIN OF RIGHT KNEE: ICD-10-CM

## 2025-05-22 PROCEDURE — 3048F LDL-C <100 MG/DL: CPT | Performed by: STUDENT IN AN ORGANIZED HEALTH CARE EDUCATION/TRAINING PROGRAM

## 2025-05-22 PROCEDURE — 99406 BEHAV CHNG SMOKING 3-10 MIN: CPT | Performed by: STUDENT IN AN ORGANIZED HEALTH CARE EDUCATION/TRAINING PROGRAM

## 2025-05-22 PROCEDURE — 3074F SYST BP LT 130 MM HG: CPT | Performed by: STUDENT IN AN ORGANIZED HEALTH CARE EDUCATION/TRAINING PROGRAM

## 2025-05-22 PROCEDURE — 3044F HG A1C LEVEL LT 7.0%: CPT | Performed by: STUDENT IN AN ORGANIZED HEALTH CARE EDUCATION/TRAINING PROGRAM

## 2025-05-22 PROCEDURE — 3008F BODY MASS INDEX DOCD: CPT | Performed by: STUDENT IN AN ORGANIZED HEALTH CARE EDUCATION/TRAINING PROGRAM

## 2025-05-22 PROCEDURE — 3078F DIAST BP <80 MM HG: CPT | Performed by: STUDENT IN AN ORGANIZED HEALTH CARE EDUCATION/TRAINING PROGRAM

## 2025-05-22 PROCEDURE — 99214 OFFICE O/P EST MOD 30 MIN: CPT | Performed by: STUDENT IN AN ORGANIZED HEALTH CARE EDUCATION/TRAINING PROGRAM

## 2025-05-22 PROCEDURE — 1159F MED LIST DOCD IN RCRD: CPT | Performed by: STUDENT IN AN ORGANIZED HEALTH CARE EDUCATION/TRAINING PROGRAM

## 2025-05-22 PROCEDURE — 1160F RVW MEDS BY RX/DR IN RCRD: CPT | Performed by: STUDENT IN AN ORGANIZED HEALTH CARE EDUCATION/TRAINING PROGRAM

## 2025-05-22 PROCEDURE — 4004F PT TOBACCO SCREEN RCVD TLK: CPT | Performed by: STUDENT IN AN ORGANIZED HEALTH CARE EDUCATION/TRAINING PROGRAM

## 2025-05-22 RX ORDER — PRAVASTATIN SODIUM 40 MG/1
40 TABLET ORAL DAILY
Qty: 90 TABLET | Refills: 1 | Status: SHIPPED | OUTPATIENT
Start: 2025-05-22

## 2025-05-22 RX ORDER — ISOPROPYL ALCOHOL 70 ML/100ML
SWAB TOPICAL
Qty: 100 EACH | Refills: 2 | Status: SHIPPED | OUTPATIENT
Start: 2025-05-22

## 2025-05-22 RX ORDER — CALCIUM CITRATE/VITAMIN D3 200MG-6.25
TABLET ORAL
Qty: 100 STRIP | Refills: 2 | Status: SHIPPED | OUTPATIENT
Start: 2025-05-22

## 2025-05-22 RX ORDER — VIT C/E/ZN/COPPR/LUTEIN/ZEAXAN 250MG-90MG
25 CAPSULE ORAL DAILY
Qty: 90 CAPSULE | Refills: 1 | Status: SHIPPED | OUTPATIENT
Start: 2025-05-22

## 2025-05-22 RX ORDER — AMLODIPINE BESYLATE 10 MG/1
10 TABLET ORAL DAILY
Qty: 90 TABLET | Refills: 1 | Status: SHIPPED | OUTPATIENT
Start: 2025-05-22

## 2025-05-22 RX ORDER — LANCETS 33 GAUGE
EACH MISCELLANEOUS
Qty: 100 EACH | Refills: 2 | Status: SHIPPED | OUTPATIENT
Start: 2025-05-22

## 2025-05-22 SDOH — ECONOMIC STABILITY: FOOD INSECURITY: WITHIN THE PAST 12 MONTHS, YOU WORRIED THAT YOUR FOOD WOULD RUN OUT BEFORE YOU GOT MONEY TO BUY MORE.: NEVER TRUE

## 2025-05-22 SDOH — ECONOMIC STABILITY: FOOD INSECURITY: WITHIN THE PAST 12 MONTHS, THE FOOD YOU BOUGHT JUST DIDN'T LAST AND YOU DIDN'T HAVE MONEY TO GET MORE.: NEVER TRUE

## 2025-05-22 ASSESSMENT — ENCOUNTER SYMPTOMS
CONSTIPATION: 0
VOMITING: 0
FATIGUE: 0
WHEEZING: 0
NAUSEA: 0
MUSCULOSKELETAL NEGATIVE: 1
COLOR CHANGE: 0
PALPITATIONS: 0
CHILLS: 0
DIZZINESS: 0
CONFUSION: 0
ABDOMINAL PAIN: 0
DIARRHEA: 0
COUGH: 0
UNEXPECTED WEIGHT CHANGE: 0
SHORTNESS OF BREATH: 0
FEVER: 0
HEADACHES: 0

## 2025-05-22 ASSESSMENT — LIFESTYLE VARIABLES
HOW MANY STANDARD DRINKS CONTAINING ALCOHOL DO YOU HAVE ON A TYPICAL DAY: PATIENT DOES NOT DRINK
SKIP TO QUESTIONS 9-10: 1
HOW OFTEN DO YOU HAVE SIX OR MORE DRINKS ON ONE OCCASION: NEVER
AUDIT-C TOTAL SCORE: 0
HOW OFTEN DO YOU HAVE A DRINK CONTAINING ALCOHOL: NEVER

## 2025-06-22 ENCOUNTER — OFFICE VISIT (OUTPATIENT)
Dept: URGENT CARE | Age: 65
End: 2025-06-22
Payer: COMMERCIAL

## 2025-06-22 VITALS
TEMPERATURE: 98.1 F | DIASTOLIC BLOOD PRESSURE: 84 MMHG | SYSTOLIC BLOOD PRESSURE: 159 MMHG | RESPIRATION RATE: 17 BRPM | OXYGEN SATURATION: 96 % | HEART RATE: 98 BPM

## 2025-06-22 DIAGNOSIS — M54.41 ACUTE RIGHT-SIDED LOW BACK PAIN WITH RIGHT-SIDED SCIATICA: Primary | ICD-10-CM

## 2025-06-22 DIAGNOSIS — M54.6 ACUTE RIGHT-SIDED THORACIC BACK PAIN: ICD-10-CM

## 2025-06-22 PROCEDURE — 3044F HG A1C LEVEL LT 7.0%: CPT | Performed by: NURSE PRACTITIONER

## 2025-06-22 PROCEDURE — 99203 OFFICE O/P NEW LOW 30 MIN: CPT | Performed by: NURSE PRACTITIONER

## 2025-06-22 PROCEDURE — 3077F SYST BP >= 140 MM HG: CPT | Performed by: NURSE PRACTITIONER

## 2025-06-22 PROCEDURE — 3079F DIAST BP 80-89 MM HG: CPT | Performed by: NURSE PRACTITIONER

## 2025-06-22 PROCEDURE — 3048F LDL-C <100 MG/DL: CPT | Performed by: NURSE PRACTITIONER

## 2025-06-22 RX ORDER — METHYLPREDNISOLONE 4 MG/1
TABLET ORAL
Qty: 21 TABLET | Refills: 0 | Status: SHIPPED | OUTPATIENT
Start: 2025-06-22 | End: 2025-06-28

## 2025-06-22 ASSESSMENT — ENCOUNTER SYMPTOMS
WEIGHT LOSS: 0
ABDOMINAL PAIN: 0
BACK PAIN: 1
PARESIS: 0
LEG PAIN: 0
HEADACHES: 0
NUMBNESS: 0
WEAKNESS: 0
BOWEL INCONTINENCE: 0
FEVER: 0
DYSURIA: 0
TINGLING: 0
PERIANAL NUMBNESS: 0
PARESTHESIAS: 0

## 2025-06-22 NOTE — PROGRESS NOTES
Subjective   Patient ID: Macarena Adler is a 65 y.o. female. They present today with a chief complaint of rt side lower back pain into rt thigh, no injury.    History of Present Illness    History provided by:  Patient   used: No    Back Pain  This is a recurrent problem. Episode onset: 1 week. The problem occurs constantly. The problem has been gradually worsening since onset. The pain is present in the lumbar spine and thoracic spine. The quality of the pain is described as aching and shooting. The pain is at a severity of 8/10. The pain is severe. The pain is The same all the time. The symptoms are aggravated by position, standing and twisting. Stiffness is present All day. Pertinent negatives include no abdominal pain, bladder incontinence, bowel incontinence, chest pain, dysuria, fever, headaches, leg pain, numbness, paresis, paresthesias, pelvic pain, perianal numbness, tingling, weakness or weight loss. (Patient denies injuries, trauma and falls) She has tried NSAIDs, bed rest and muscle relaxant (lidocaine patches, muscle relaxant) for the symptoms. The treatment provided no relief.       Past Medical History  Allergies as of 06/22/2025    (No Known Allergies)       Prescriptions Prior to Admission[1]     Medical History[2]    Surgical History[3]     reports that she has been smoking cigarettes. She started smoking about 39 years ago. She has a 39.7 pack-year smoking history. She has never used smokeless tobacco. She reports that she does not drink alcohol and does not use drugs.    Review of Systems  Review of Systems   Constitutional:  Negative for fever and weight loss.   Cardiovascular:  Negative for chest pain.   Gastrointestinal:  Negative for abdominal pain and bowel incontinence.   Genitourinary:  Negative for bladder incontinence, dysuria and pelvic pain.   Musculoskeletal:  Positive for back pain.   Neurological:  Negative for tingling, weakness, numbness, headaches and  paresthesias.            Objective    Vitals:    06/22/25 1343   BP: 159/84   Pulse: 98   Resp: 17   Temp: 36.7 °C (98.1 °F)   SpO2: 96%     No LMP recorded. Patient has had a hysterectomy.    Physical Exam  Vitals and nursing note reviewed.   Constitutional:       Appearance: Normal appearance.   HENT:      Head: Normocephalic and atraumatic.   Cardiovascular:      Rate and Rhythm: Normal rate and regular rhythm.   Pulmonary:      Effort: Pulmonary effort is normal.      Breath sounds: Normal breath sounds.   Musculoskeletal:      Comments: No obvious deformity, redness, and swelling of the thoracic and lumbar spine. There is tenderness on palpation to the right paraspinal planes throughout the thoracic and lumbar spine. Normal motor sensory, symmetric reflexes, and strong equal peripheral pulses.       Neurological:      Mental Status: She is alert.         Procedures    Point of Care Test & Imaging Results from this visit  No results found for this visit on 06/22/25.   Imaging  No results found.    Cardiology, Vascular, and Other Imaging  No other imaging results found for the past 2 days      Diagnostic study results (if any) were reviewed by MARY Holliday.    Assessment/Plan   Allergies, medications, history, and pertinent labs/EKGs/Imaging reviewed by MARY Holliday.     Medical Decision Making  Stop ibuprofen while taking Medrol Dosepak. Resume ibuprofen after finishing Medrol Dosepak.  Rest and warm and cold compress as needed.    Take Tylenol as needed for aches and pain.  Refer to orthopedics.  If symptoms do not improve, advised to return and/or follow-up with PCP.  Call 911 or go to the nearest ER if the symptoms worsen.  Patient verbalized understanding of these instructions and left in stable condition.      Orders and Diagnoses  Diagnoses and all orders for this visit:  Acute right-sided low back pain with right-sided sciatica  -     methylPREDNISolone (Medrol Dospak) 4 mg tablets; Take  as directed on package.  -     Referral to Orthopedics and Sports Medicine; Future  Acute right-sided thoracic back pain  -     methylPREDNISolone (Medrol Dospak) 4 mg tablets; Take as directed on package.  -     Referral to Orthopedics and Sports Medicine; Future      Medical Admin Record      Patient disposition: Home    Electronically signed by MARY Holliday  2:07 PM           [1] (Not in a hospital admission)   [2]   Past Medical History:  Diagnosis Date    Diabetes (Multi)     Hyperlipidemia     Hypertension     Personal history of other diseases of the circulatory system     History of hypertension    Personal history of other endocrine, nutritional and metabolic disease     History of hyperlipidemia   [3]   Past Surgical History:  Procedure Laterality Date    HYSTERECTOMY  08/17/2017    Hysterectomy

## 2025-07-06 ENCOUNTER — HOSPITAL ENCOUNTER (EMERGENCY)
Facility: HOSPITAL | Age: 65
Discharge: HOME | End: 2025-07-06
Attending: EMERGENCY MEDICINE
Payer: COMMERCIAL

## 2025-07-06 VITALS
WEIGHT: 293 LBS | SYSTOLIC BLOOD PRESSURE: 126 MMHG | BODY MASS INDEX: 39.68 KG/M2 | RESPIRATION RATE: 20 BRPM | TEMPERATURE: 96.8 F | HEART RATE: 120 BPM | OXYGEN SATURATION: 97 % | DIASTOLIC BLOOD PRESSURE: 89 MMHG | HEIGHT: 72 IN

## 2025-07-06 DIAGNOSIS — M54.16 LUMBAR RADICULOPATHY: Primary | ICD-10-CM

## 2025-07-06 PROCEDURE — 2500000001 HC RX 250 WO HCPCS SELF ADMINISTERED DRUGS (ALT 637 FOR MEDICARE OP): Performed by: EMERGENCY MEDICINE

## 2025-07-06 PROCEDURE — 2500000005 HC RX 250 GENERAL PHARMACY W/O HCPCS: Performed by: EMERGENCY MEDICINE

## 2025-07-06 PROCEDURE — 99283 EMERGENCY DEPT VISIT LOW MDM: CPT | Performed by: EMERGENCY MEDICINE

## 2025-07-06 RX ORDER — HYDROCODONE BITARTRATE AND ACETAMINOPHEN 5; 325 MG/1; MG/1
1 TABLET ORAL EVERY 6 HOURS PRN
Qty: 12 TABLET | Refills: 0 | Status: SHIPPED | OUTPATIENT
Start: 2025-07-06 | End: 2025-07-09

## 2025-07-06 RX ORDER — HYDROCODONE BITARTRATE AND ACETAMINOPHEN 5; 325 MG/1; MG/1
1 TABLET ORAL ONCE
Refills: 0 | Status: COMPLETED | OUTPATIENT
Start: 2025-07-06 | End: 2025-07-06

## 2025-07-06 RX ORDER — LIDOCAINE 50 MG/G
1 PATCH TOPICAL DAILY
Qty: 10 PATCH | Refills: 0 | Status: SHIPPED | OUTPATIENT
Start: 2025-07-06

## 2025-07-06 RX ORDER — LIDOCAINE 560 MG/1
1 PATCH PERCUTANEOUS; TOPICAL; TRANSDERMAL ONCE
Status: DISCONTINUED | OUTPATIENT
Start: 2025-07-06 | End: 2025-07-06 | Stop reason: HOSPADM

## 2025-07-06 RX ADMIN — LIDOCAINE 4% 1 PATCH: 40 PATCH TOPICAL at 14:48

## 2025-07-06 RX ADMIN — HYDROCODONE BITARTRATE AND ACETAMINOPHEN 1 TABLET: 5; 325 TABLET ORAL at 14:48

## 2025-07-06 ASSESSMENT — PAIN - FUNCTIONAL ASSESSMENT: PAIN_FUNCTIONAL_ASSESSMENT: 0-10

## 2025-07-06 ASSESSMENT — PAIN SCALES - GENERAL: PAINLEVEL_OUTOF10: 10 - WORST POSSIBLE PAIN

## 2025-07-06 NOTE — ED PROVIDER NOTES
HPI   Chief Complaint   Patient presents with    Hip Pain    Back Pain       Presents to the emergency department secondary to right sided back pain that radiates down the right leg.  Patient states this has been going on for the last 3 to 4 weeks.  She was seen at urgent care and given a steroid Dosepak which did not help at all.  She has been using over-the-counter patches.  She has not called her primary care physician.  She has not injured herself.  No chest pain or shortness of breath.  No nausea or vomiting.  No abdominal pain.  No numbness or tingling in the extremity.  No other complaints at this time.                          Tracy Coma Scale Score: 15                  Patient History   Medical History[1]  Surgical History[2]  Family History[3]  Social History[4]    Physical Exam   ED Triage Vitals [07/06/25 1240]   Temperature Heart Rate Respirations BP   36 °C (96.8 °F) (!) 120 20 126/89      Pulse Ox Temp Source Heart Rate Source Patient Position   97 % Temporal Monitor --      BP Location FiO2 (%)     Right arm --       Physical Exam  Vitals and nursing note reviewed.   Constitutional:       Appearance: Normal appearance.   HENT:      Head: Normocephalic.      Nose: Nose normal.      Mouth/Throat:      Mouth: Mucous membranes are moist.   Eyes:      Extraocular Movements: Extraocular movements intact.      Pupils: Pupils are equal, round, and reactive to light.   Cardiovascular:      Rate and Rhythm: Normal rate and regular rhythm.      Pulses: Normal pulses.      Heart sounds: Normal heart sounds.   Pulmonary:      Effort: Pulmonary effort is normal.      Breath sounds: Normal breath sounds.   Abdominal:      General: Abdomen is flat. Bowel sounds are normal.      Palpations: Abdomen is soft.      Tenderness: There is no abdominal tenderness. There is no guarding or rebound.   Musculoskeletal:         General: Normal range of motion.      Cervical back: Normal range of motion.      Comments: Right SI  tenderness to palpation.  Pain radiated down right leg with palpation.   Skin:     General: Skin is warm and dry.      Capillary Refill: Capillary refill takes less than 2 seconds.   Neurological:      General: No focal deficit present.      Mental Status: She is alert and oriented to person, place, and time.   Psychiatric:         Mood and Affect: Mood normal.         Behavior: Behavior normal.       Labs Reviewed - No data to display  Pain Management Panel           No data to display              No orders to display     ED Course & MDM   Diagnoses as of 07/06/25 1437   Lumbar radiculopathy       Medical Decision Making  Patient presents with right SI pain with radiculopathy.  Patient is given a lidocaine patch and given a Norco tablet for pain.  No recent trauma or injury.  X-rays are not clinically indicated.  Patient has no evidence of cauda equina.  Patient is instructed to follow-up with primary care physician.  She may need physical therapy or other evaluation to facilitate improvement in her symptoms.  Patient is stable for discharge at this time.        Procedure  Procedures         [1]   Past Medical History:  Diagnosis Date    Diabetes (Multi)     Hyperlipidemia     Hypertension     Personal history of other diseases of the circulatory system     History of hypertension    Personal history of other endocrine, nutritional and metabolic disease     History of hyperlipidemia   [2]   Past Surgical History:  Procedure Laterality Date    HYSTERECTOMY  08/17/2017    Hysterectomy   [3]   Family History  Problem Relation Name Age of Onset    Colon cancer Mother      No Known Problems Father      Colon cancer Other     [4]   Social History  Tobacco Use    Smoking status: Every Day     Current packs/day: 0.50     Average packs/day: 0.7 packs/day for 59.5 years (39.8 ttl pk-yrs)     Types: Cigarettes     Start date: 1986    Smokeless tobacco: Never   Vaping Use    Vaping status: Never Used   Substance Use Topics     Alcohol use: Never    Drug use: Never        Sherine Patrick MD  07/06/25 1801

## 2025-07-06 NOTE — ED TRIAGE NOTES
Pt here with complaints of 10/10 right hip and lower back pain. She was seen at  and given what she believes was a steroid. She states it did not help. Pain has been ongoing for 3-4 weeks without known injury.

## 2025-07-20 DIAGNOSIS — E55.9 VITAMIN D DEFICIENCY: ICD-10-CM

## 2025-07-22 RX ORDER — AMPICILLIN TRIHYDRATE 500 MG
25 CAPSULE ORAL DAILY
Qty: 90 CAPSULE | Refills: 1 | Status: SHIPPED | OUTPATIENT
Start: 2025-07-22

## 2025-07-28 ENCOUNTER — TELEPHONE (OUTPATIENT)
Dept: PRIMARY CARE | Facility: CLINIC | Age: 65
End: 2025-07-28

## 2025-07-28 DIAGNOSIS — M25.551 CHRONIC HIP PAIN, RIGHT: Primary | ICD-10-CM

## 2025-07-28 DIAGNOSIS — G89.29 CHRONIC HIP PAIN, RIGHT: Primary | ICD-10-CM

## 2025-08-15 ENCOUNTER — EVALUATION (OUTPATIENT)
Dept: PHYSICAL THERAPY | Facility: HOSPITAL | Age: 65
End: 2025-08-15
Payer: COMMERCIAL

## 2025-08-15 DIAGNOSIS — R29.898 RIGHT LEG WEAKNESS: ICD-10-CM

## 2025-08-15 DIAGNOSIS — R26.9 GAIT ABNORMALITY: Primary | ICD-10-CM

## 2025-08-15 PROCEDURE — 97161 PT EVAL LOW COMPLEX 20 MIN: CPT | Mod: GP | Performed by: PHYSICAL THERAPIST

## 2025-08-15 PROCEDURE — 97110 THERAPEUTIC EXERCISES: CPT | Mod: GP | Performed by: PHYSICAL THERAPIST

## 2025-08-20 ENCOUNTER — TREATMENT (OUTPATIENT)
Dept: PHYSICAL THERAPY | Facility: HOSPITAL | Age: 65
End: 2025-08-20
Payer: COMMERCIAL

## 2025-08-20 DIAGNOSIS — R26.9 GAIT ABNORMALITY: Primary | ICD-10-CM

## 2025-08-20 DIAGNOSIS — R29.898 RIGHT LEG WEAKNESS: ICD-10-CM

## 2025-08-20 PROCEDURE — 97110 THERAPEUTIC EXERCISES: CPT | Mod: GP | Performed by: PHYSICAL THERAPIST

## 2025-08-22 ENCOUNTER — DOCUMENTATION (OUTPATIENT)
Dept: PHYSICAL THERAPY | Facility: HOSPITAL | Age: 65
End: 2025-08-22
Payer: COMMERCIAL

## 2025-08-22 DIAGNOSIS — R29.898 RIGHT LEG WEAKNESS: ICD-10-CM

## 2025-08-22 DIAGNOSIS — R26.9 GAIT ABNORMALITY: Primary | ICD-10-CM

## 2025-08-26 ENCOUNTER — TREATMENT (OUTPATIENT)
Dept: PHYSICAL THERAPY | Facility: HOSPITAL | Age: 65
End: 2025-08-26
Payer: COMMERCIAL

## 2025-08-26 DIAGNOSIS — R26.9 GAIT ABNORMALITY: Primary | ICD-10-CM

## 2025-08-26 DIAGNOSIS — R29.898 RIGHT LEG WEAKNESS: ICD-10-CM

## 2025-08-26 PROCEDURE — 97110 THERAPEUTIC EXERCISES: CPT | Mod: GP,CQ

## 2025-08-28 ENCOUNTER — TREATMENT (OUTPATIENT)
Dept: PHYSICAL THERAPY | Facility: HOSPITAL | Age: 65
End: 2025-08-28
Payer: COMMERCIAL

## 2025-08-28 DIAGNOSIS — R29.898 RIGHT LEG WEAKNESS: ICD-10-CM

## 2025-08-28 DIAGNOSIS — R26.9 GAIT ABNORMALITY: Primary | ICD-10-CM

## 2025-08-28 PROCEDURE — 97110 THERAPEUTIC EXERCISES: CPT | Mod: GP,CQ

## 2025-09-02 ENCOUNTER — TREATMENT (OUTPATIENT)
Dept: PHYSICAL THERAPY | Facility: HOSPITAL | Age: 65
End: 2025-09-02
Payer: COMMERCIAL

## 2025-09-02 DIAGNOSIS — R26.9 GAIT ABNORMALITY: Primary | ICD-10-CM

## 2025-09-02 DIAGNOSIS — R29.898 RIGHT LEG WEAKNESS: ICD-10-CM

## 2025-09-02 PROCEDURE — 97110 THERAPEUTIC EXERCISES: CPT | Mod: GP,CQ

## 2025-09-03 LAB
ALBUMIN SERPL-MCNC: 4 G/DL (ref 3.6–5.1)
ALBUMIN/CREAT UR: 3 MG/G CREAT
ALP SERPL-CCNC: 104 U/L (ref 37–153)
ALT SERPL-CCNC: 14 U/L (ref 6–29)
ANION GAP SERPL CALCULATED.4IONS-SCNC: 12 MMOL/L (CALC) (ref 7–17)
AST SERPL-CCNC: 14 U/L (ref 10–35)
BILIRUB SERPL-MCNC: 0.4 MG/DL (ref 0.2–1.2)
BUN SERPL-MCNC: 9 MG/DL (ref 7–25)
CALCIUM SERPL-MCNC: 9.1 MG/DL (ref 8.6–10.4)
CHLORIDE SERPL-SCNC: 105 MMOL/L (ref 98–110)
CHOLEST SERPL-MCNC: 109 MG/DL
CHOLEST/HDLC SERPL: 2.1 (CALC)
CO2 SERPL-SCNC: 22 MMOL/L (ref 20–32)
CREAT SERPL-MCNC: 0.79 MG/DL (ref 0.5–1.05)
CREAT UR-MCNC: 144 MG/DL (ref 20–275)
EGFRCR SERPLBLD CKD-EPI 2021: 82 ML/MIN/1.73M2
ERYTHROCYTE [DISTWIDTH] IN BLOOD BY AUTOMATED COUNT: 14.2 % (ref 11–15)
EST. AVERAGE GLUCOSE BLD GHB EST-MCNC: 148 MG/DL
EST. AVERAGE GLUCOSE BLD GHB EST-SCNC: 8.2 MMOL/L
GLUCOSE SERPL-MCNC: 104 MG/DL (ref 65–99)
HBA1C MFR BLD: 6.8 %
HCT VFR BLD AUTO: 43.2 % (ref 35–45)
HDLC SERPL-MCNC: 53 MG/DL
HGB BLD-MCNC: 14.3 G/DL (ref 11.7–15.5)
LDLC SERPL CALC-MCNC: 41 MG/DL (CALC)
MCH RBC QN AUTO: 29.5 PG (ref 27–33)
MCHC RBC AUTO-ENTMCNC: 33.1 G/DL (ref 32–36)
MCV RBC AUTO: 89.1 FL (ref 80–100)
MICROALBUMIN UR-MCNC: 0.4 MG/DL
NONHDLC SERPL-MCNC: 56 MG/DL (CALC)
PLATELET # BLD AUTO: 285 THOUSAND/UL (ref 140–400)
PMV BLD REES-ECKER: 10.6 FL (ref 7.5–12.5)
POTASSIUM SERPL-SCNC: 4.3 MMOL/L (ref 3.5–5.3)
PROT SERPL-MCNC: 6.9 G/DL (ref 6.1–8.1)
RBC # BLD AUTO: 4.85 MILLION/UL (ref 3.8–5.1)
SODIUM SERPL-SCNC: 139 MMOL/L (ref 135–146)
TRIGL SERPL-MCNC: 70 MG/DL
WBC # BLD AUTO: 4.9 THOUSAND/UL (ref 3.8–10.8)

## 2025-09-04 ENCOUNTER — TREATMENT (OUTPATIENT)
Dept: PHYSICAL THERAPY | Facility: HOSPITAL | Age: 65
End: 2025-09-04
Payer: COMMERCIAL

## 2025-09-04 DIAGNOSIS — R29.898 RIGHT LEG WEAKNESS: ICD-10-CM

## 2025-09-04 DIAGNOSIS — R26.9 GAIT ABNORMALITY: Primary | ICD-10-CM

## 2025-09-04 PROCEDURE — 97110 THERAPEUTIC EXERCISES: CPT | Mod: GP | Performed by: PHYSICAL THERAPIST

## 2025-09-16 ENCOUNTER — APPOINTMENT (OUTPATIENT)
Dept: PRIMARY CARE | Facility: CLINIC | Age: 65
End: 2025-09-16
Payer: COMMERCIAL

## 2025-10-06 ENCOUNTER — APPOINTMENT (OUTPATIENT)
Dept: PODIATRY | Facility: CLINIC | Age: 65
End: 2025-10-06
Payer: COMMERCIAL